# Patient Record
Sex: MALE | Race: WHITE | NOT HISPANIC OR LATINO | Employment: OTHER | URBAN - NONMETROPOLITAN AREA
[De-identification: names, ages, dates, MRNs, and addresses within clinical notes are randomized per-mention and may not be internally consistent; named-entity substitution may affect disease eponyms.]

---

## 2017-01-16 ENCOUNTER — APPOINTMENT (OUTPATIENT)
Dept: LAB | Facility: HOSPITAL | Age: 77
End: 2017-01-16

## 2017-01-16 ENCOUNTER — TRANSCRIBE ORDERS (OUTPATIENT)
Dept: ADMINISTRATIVE | Facility: HOSPITAL | Age: 77
End: 2017-01-16

## 2017-01-16 DIAGNOSIS — M25.449 EFFUSION OF HAND, UNSPECIFIED LATERALITY: Primary | ICD-10-CM

## 2017-01-16 LAB
BASOPHILS # BLD AUTO: 0.04 10*3/MM3 (ref 0–0.2)
BASOPHILS NFR BLD AUTO: 0.4 % (ref 0–2)
CHROMATIN AB SERPL-ACNC: <8.6 IU/ML (ref 0–11.9)
CRP SERPL-MCNC: 1.92 MG/DL (ref 0–0.99)
DEPRECATED RDW RBC AUTO: 48.1 FL (ref 40–54)
EOSINOPHIL # BLD AUTO: 1.06 10*3/MM3 (ref 0–0.7)
EOSINOPHIL NFR BLD AUTO: 10.5 % (ref 0–4)
ERYTHROCYTE [DISTWIDTH] IN BLOOD BY AUTOMATED COUNT: 14.8 % (ref 12–15)
ERYTHROCYTE [SEDIMENTATION RATE] IN BLOOD: 9 MM/HR (ref 0–15)
HCT VFR BLD AUTO: 41 % (ref 40–52)
HGB BLD-MCNC: 13.8 G/DL (ref 14–18)
IMM GRANULOCYTES # BLD: 0.03 10*3/MM3 (ref 0–0.03)
IMM GRANULOCYTES NFR BLD: 0.3 % (ref 0–5)
LYMPHOCYTES # BLD AUTO: 2.2 10*3/MM3 (ref 0.72–4.86)
LYMPHOCYTES NFR BLD AUTO: 21.7 % (ref 15–45)
MCH RBC QN AUTO: 30.2 PG (ref 28–32)
MCHC RBC AUTO-ENTMCNC: 33.7 G/DL (ref 33–36)
MCV RBC AUTO: 89.7 FL (ref 82–95)
MONOCYTES # BLD AUTO: 0.95 10*3/MM3 (ref 0.19–1.3)
MONOCYTES NFR BLD AUTO: 9.4 % (ref 4–12)
NEUTROPHILS # BLD AUTO: 5.86 10*3/MM3 (ref 1.87–8.4)
NEUTROPHILS NFR BLD AUTO: 57.7 % (ref 39–78)
PLATELET # BLD AUTO: 183 10*3/MM3 (ref 130–400)
PMV BLD AUTO: 12.2 FL (ref 6–12)
RBC # BLD AUTO: 4.57 10*6/MM3 (ref 4.8–5.9)
URATE SERPL-MCNC: 5.1 MG/DL (ref 3.5–8.5)
WBC NRBC COR # BLD: 10.14 10*3/MM3 (ref 4.8–10.8)

## 2017-01-16 PROCEDURE — 86431 RHEUMATOID FACTOR QUANT: CPT | Performed by: PHYSICIAN ASSISTANT

## 2017-01-16 PROCEDURE — 86140 C-REACTIVE PROTEIN: CPT | Performed by: PHYSICIAN ASSISTANT

## 2017-01-16 PROCEDURE — 86038 ANTINUCLEAR ANTIBODIES: CPT | Performed by: PHYSICIAN ASSISTANT

## 2017-01-16 PROCEDURE — 85025 COMPLETE CBC W/AUTO DIFF WBC: CPT | Performed by: PHYSICIAN ASSISTANT

## 2017-01-16 PROCEDURE — 86200 CCP ANTIBODY: CPT | Performed by: PHYSICIAN ASSISTANT

## 2017-01-16 PROCEDURE — 85651 RBC SED RATE NONAUTOMATED: CPT | Performed by: PHYSICIAN ASSISTANT

## 2017-01-16 PROCEDURE — 36415 COLL VENOUS BLD VENIPUNCTURE: CPT | Performed by: PHYSICIAN ASSISTANT

## 2017-01-16 PROCEDURE — 84550 ASSAY OF BLOOD/URIC ACID: CPT | Performed by: PHYSICIAN ASSISTANT

## 2017-01-17 LAB
ANA SER QL IA: POSITIVE
ANA SPECKLED TITR SER: ABNORMAL {TITER}
CCP IGA+IGG SERPL IA-ACNC: 12 UNITS (ref 0–19)
Lab: ABNORMAL

## 2019-04-04 RX ORDER — SODIUM BICARBONATE 650 MG/1
TABLET ORAL
COMMUNITY

## 2019-04-04 RX ORDER — AMLODIPINE BESYLATE 5 MG/1
TABLET ORAL
COMMUNITY
Start: 2015-10-21

## 2019-04-04 RX ORDER — OMEPRAZOLE 20 MG/1
CAPSULE, DELAYED RELEASE ORAL
COMMUNITY
End: 2022-05-11

## 2019-04-04 RX ORDER — ATORVASTATIN CALCIUM 10 MG/1
10 TABLET, FILM COATED ORAL 3 TIMES WEEKLY
COMMUNITY

## 2019-04-04 RX ORDER — PRASUGREL 10 MG/1
5 TABLET, FILM COATED ORAL DAILY
COMMUNITY

## 2019-04-04 RX ORDER — FLUTICASONE PROPIONATE 50 MCG
SPRAY, SUSPENSION (ML) NASAL
COMMUNITY
End: 2020-02-04 | Stop reason: ALTCHOICE

## 2019-04-04 RX ORDER — ATENOLOL 25 MG/1
TABLET ORAL
COMMUNITY

## 2019-04-04 RX ORDER — HYDROXYZINE HYDROCHLORIDE 10 MG/1
TABLET, FILM COATED ORAL
Status: ON HOLD | COMMUNITY
Start: 2015-12-21 | End: 2022-11-16

## 2019-04-04 RX ORDER — PANTOPRAZOLE SODIUM 20 MG/1
TABLET, DELAYED RELEASE ORAL
COMMUNITY
End: 2020-02-04 | Stop reason: ALTCHOICE

## 2019-04-04 RX ORDER — HYDRALAZINE HYDROCHLORIDE 10 MG/1
TABLET, FILM COATED ORAL
COMMUNITY
Start: 2015-12-21

## 2019-04-04 RX ORDER — GABAPENTIN 300 MG/1
CAPSULE ORAL
COMMUNITY

## 2019-04-04 RX ORDER — TAMSULOSIN HYDROCHLORIDE 0.4 MG/1
CAPSULE ORAL
COMMUNITY
Start: 2014-10-09

## 2019-04-04 RX ORDER — LOSARTAN POTASSIUM 25 MG/1
TABLET ORAL
COMMUNITY
Start: 2016-06-22

## 2019-04-04 RX ORDER — NITROGLYCERIN 0.4 MG/1
TABLET SUBLINGUAL
COMMUNITY

## 2019-04-17 ENCOUNTER — OFFICE VISIT (OUTPATIENT)
Dept: GASTROENTEROLOGY | Facility: CLINIC | Age: 79
End: 2019-04-17

## 2019-04-17 VITALS
HEIGHT: 68 IN | SYSTOLIC BLOOD PRESSURE: 126 MMHG | OXYGEN SATURATION: 97 % | DIASTOLIC BLOOD PRESSURE: 74 MMHG | WEIGHT: 190 LBS | HEART RATE: 78 BPM | TEMPERATURE: 97.6 F | BODY MASS INDEX: 28.79 KG/M2

## 2019-04-17 DIAGNOSIS — Z86.010 HISTORY OF ADENOMATOUS POLYP OF COLON: Primary | ICD-10-CM

## 2019-04-17 DIAGNOSIS — Z79.01 ANTICOAGULATED: ICD-10-CM

## 2019-04-17 PROBLEM — Z86.0101 HISTORY OF ADENOMATOUS POLYP OF COLON: Status: ACTIVE | Noted: 2019-04-17

## 2019-04-17 PROCEDURE — S0260 H&P FOR SURGERY: HCPCS | Performed by: NURSE PRACTITIONER

## 2019-04-17 RX ORDER — ASPIRIN 81 MG/1
81 TABLET ORAL DAILY
COMMUNITY
End: 2021-11-10 | Stop reason: ALTCHOICE

## 2019-04-17 RX ORDER — ALLOPURINOL 100 MG/1
100 TABLET ORAL DAILY
COMMUNITY

## 2019-04-17 RX ORDER — AMOXICILLIN 250 MG
CAPSULE ORAL
COMMUNITY

## 2019-04-17 NOTE — PROGRESS NOTES
Chief Complaint   Patient presents with   • Colonoscopy     3-22-16 colon 3 polyps 3 year recall       PCP: Laura Weston MD  REFER: No ref. provider found    Subjective     HPI    Hoang Renteria is a 78 y.o. male who presents to office for preventative maintenance.  There is  a personal history of colon polyps.  There is not a history of colon cancer.  He does not have complaints of nausea/vomiting, change in bowels, weight loss, no BRBPR, no melena.  There is not a family history of colon cancer.  There is not a family history of colon polyps.  He last colonoscopy-2016 .  Bowels do move on regular basis.    Anticoagulated due to history of cardiac stent placement    CScope (Dr Ann) 2016-tubular adenoma       Past Medical History:   Diagnosis Date   • Hyperlipidemia    • Hypertension      Past Surgical History:   Procedure Laterality Date   • COLONOSCOPY  03/22/2016    three polyps  all removed   • HERNIA REPAIR       Outpatient Medications Marked as Taking for the 4/17/19 encounter (Office Visit) with Brayan Rocha APRN   Medication Sig Dispense Refill   • allopurinol (ZYLOPRIM) 100 MG tablet Take 100 mg by mouth Daily.     • amLODIPine (NORVASC) 5 MG tablet Take 5 mg by mouth 2 times daily     • aspirin 81 MG EC tablet Take 81 mg by mouth Daily.     • atenolol (TENORMIN) 25 MG tablet Take 25 mg by mouth daily.     • atorvastatin (LIPITOR) 10 MG tablet Take 10 mg by mouth daily.     • Cobalamine Combinations (B-12) 100-5000 MCG sublingual tablet Place  under the tongue.     • gabapentin (NEURONTIN) 300 MG capsule Take 300 mg by mouth 2 times daily.     • hydrALAZINE (APRESOLINE) 10 MG tablet Take 10 mg by mouth 2 times daily     • hydrOXYzine (ATARAX) 10 MG tablet Take 10 mg by mouth daily     • losartan (COZAAR) 25 MG tablet Take 25 mg by mouth     • nitroglycerin (NITROSTAT) 0.4 MG SL tablet Place 0.4 mg under the tongue every 5 minutes as needed.     • omeprazole (priLOSEC) 20 MG capsule  Take 20 mg by mouth daily.     • prasugrel (EFFIENT) 10 MG tablet Take 10 mg by mouth daily.     • sodium bicarbonate 650 MG tablet Take 650 mg by mouth 3 times daily.     • tamsulosin (FLOMAX) 0.4 MG capsule 24 hr capsule Take 0.4 mg by mouth daily.       Allergies   Allergen Reactions   • Sulfamethoxazole-Trimethoprim Other (See Comments)     He was told at the hospital he was allergic to this medication.      Social History     Socioeconomic History   • Marital status:      Spouse name: Not on file   • Number of children: Not on file   • Years of education: Not on file   • Highest education level: Not on file   Tobacco Use   • Smoking status: Current Some Day Smoker     Packs/day: 1.00     Years: 60.00     Pack years: 60.00   • Smokeless tobacco: Former User   Substance and Sexual Activity   • Alcohol use: No     Frequency: Never   • Drug use: No     Family History   Problem Relation Age of Onset   • Colon cancer Neg Hx    • Colon polyps Neg Hx    • Esophageal cancer Neg Hx      Review of Systems   Constitutional: Negative for fatigue, fever and unexpected weight change.   HENT: Negative for hearing loss, sore throat and voice change.    Eyes: Negative for visual disturbance.   Respiratory: Negative for cough, shortness of breath and wheezing.    Cardiovascular: Negative for chest pain and palpitations.   Gastrointestinal: Negative for abdominal pain, blood in stool and vomiting.   Endocrine: Negative for polydipsia and polyuria.   Genitourinary: Negative for difficulty urinating, dysuria, hematuria and urgency.   Musculoskeletal: Negative for joint swelling and myalgias.   Skin: Negative for color change, rash and wound.   Neurological: Negative for dizziness, tremors, seizures and syncope.   Hematological: Does not bruise/bleed easily.   Psychiatric/Behavioral: Negative for agitation and confusion. The patient is not nervous/anxious.      Objective   Vitals:    04/17/19 0809   BP: 126/74   Pulse: 78    Temp: 97.6 °F (36.4 °C)   SpO2: 97%     Physical Exam   Constitutional: He is oriented to person, place, and time. He appears well-developed and well-nourished. He is cooperative.   HENT:   Head: Normocephalic and atraumatic.   Eyes: Conjunctivae are normal. Pupils are equal, round, and reactive to light. No scleral icterus.   Neck: Normal range of motion. Neck supple. No JVD present. No thyroid mass and no thyromegaly present.   Cardiovascular: Normal rate, regular rhythm and normal heart sounds. Exam reveals no gallop and no friction rub.   No murmur heard.  Pulmonary/Chest: Effort normal and breath sounds normal. No accessory muscle usage. No respiratory distress. He has no wheezes. He has no rales.   Abdominal: Soft. Normal appearance and bowel sounds are normal. He exhibits no distension, no ascites and no mass. There is no hepatosplenomegaly. There is no tenderness. There is no rebound and no guarding.   Musculoskeletal: Normal range of motion. He exhibits no edema or tenderness.     Vascular Status -  His right foot exhibits normal foot vasculature  and no edema. His left foot exhibits normal foot vasculature  and no edema.  Lymphadenopathy:     He has no cervical adenopathy.   Neurological: He is alert and oriented to person, place, and time. He has normal strength. Gait normal.   Skin: Skin is warm, dry and intact. No rash noted.     Imaging Results (most recent)     None        Body mass index is 28.89 kg/m².    Assessment/Plan   Hoang was seen today for colonoscopy.    Diagnoses and all orders for this visit:    History of adenomatous polyp of colon  -     Case Request; Standing  -     Implement Anesthesia Orders Day of Procedure; Standing  -     Obtain Informed Consent; Standing  -     Case Request    Anticoagulated  Comments:  will need to hold 7 days prior to procedure      COLONOSCOPY WITH ANESTHESIA (N/A)    Patient is to hold their anticoagulation medication per the direction of their prescribing  practioner, CITLALLI Morin. This is to prevent any risk or complication from bleeding intra and post procedure. If they develop bleeding post procedure they are to go the emergency department for further evaluation and treatment immediately    Advised pt to stop ASA, use of NSAIDs, Fish Oil, and MV 5 days prior to procedure, per Dr Ann protocol.  Tylenol based products are ok to take.  Pt verbalized understanding.    Patient is to continue all blood pressure and cardiac medications prior to procedure and has been advised to take medications morning of procedure   Pt verbalized understanding    All risks, benefits, alternatives, and indications of colonoscopy procedure have been discussed with the patient. Risks to include perforation of the colon requiring possible surgery or colostomy, risk of bleeding from biopsies or removal of colon tissue, possibility of missing a colon polyp or cancer, or adverse drug reaction.  Benefits to include the diagnosis and management of disease of the colon and rectum. Alternatives to include barium enema, radiographic evaluation, lab testing or no intervention. He verbalizes understanding and agrees.     Patient's Body mass index is 28.89 kg/m². BMI is above normal parameters. Recommendations include: no follow up     I advised Hoang of the risks of continuing to use tobacco, and I provided him with tobacco cessation educational materials in the After Visit Summary.     During this visit, I spent 3 minutes counseling the patient regarding tobacco cessation.  .      There are no Patient Instructions on file for this visit.

## 2019-05-01 ENCOUNTER — HOSPITAL ENCOUNTER (OUTPATIENT)
Facility: HOSPITAL | Age: 79
Setting detail: HOSPITAL OUTPATIENT SURGERY
Discharge: HOME | End: 2019-05-01
Attending: INTERNAL MEDICINE | Admitting: INTERNAL MEDICINE

## 2019-05-01 ENCOUNTER — ANESTHESIA (OUTPATIENT)
Dept: GASTROENTEROLOGY | Facility: HOSPITAL | Age: 79
End: 2019-05-01

## 2019-05-01 ENCOUNTER — ANESTHESIA EVENT (OUTPATIENT)
Dept: GASTROENTEROLOGY | Facility: HOSPITAL | Age: 79
End: 2019-05-01

## 2019-05-01 VITALS
HEIGHT: 67 IN | RESPIRATION RATE: 16 BRPM | DIASTOLIC BLOOD PRESSURE: 67 MMHG | SYSTOLIC BLOOD PRESSURE: 126 MMHG | WEIGHT: 183 LBS | TEMPERATURE: 97.6 F | OXYGEN SATURATION: 93 % | HEART RATE: 63 BPM | BODY MASS INDEX: 28.72 KG/M2

## 2019-05-01 DIAGNOSIS — Z86.010 HISTORY OF ADENOMATOUS POLYP OF COLON: ICD-10-CM

## 2019-05-01 PROCEDURE — 25010000002 PROPOFOL 10 MG/ML EMULSION: Performed by: NURSE ANESTHETIST, CERTIFIED REGISTERED

## 2019-05-01 PROCEDURE — 88305 TISSUE EXAM BY PATHOLOGIST: CPT | Performed by: INTERNAL MEDICINE

## 2019-05-01 PROCEDURE — 45385 COLONOSCOPY W/LESION REMOVAL: CPT | Performed by: INTERNAL MEDICINE

## 2019-05-01 RX ORDER — SODIUM CHLORIDE 0.9 % (FLUSH) 0.9 %
3 SYRINGE (ML) INJECTION AS NEEDED
Status: DISCONTINUED | OUTPATIENT
Start: 2019-05-01 | End: 2019-05-01 | Stop reason: HOSPADM

## 2019-05-01 RX ORDER — SODIUM CHLORIDE 9 MG/ML
100 INJECTION, SOLUTION INTRAVENOUS CONTINUOUS
Status: CANCELLED | OUTPATIENT
Start: 2019-05-01

## 2019-05-01 RX ORDER — IPRATROPIUM BROMIDE AND ALBUTEROL SULFATE 2.5; .5 MG/3ML; MG/3ML
3 SOLUTION RESPIRATORY (INHALATION) ONCE
Status: COMPLETED | OUTPATIENT
Start: 2019-05-01 | End: 2019-05-01

## 2019-05-01 RX ORDER — SODIUM CHLORIDE 0.9 % (FLUSH) 0.9 %
3 SYRINGE (ML) INJECTION EVERY 12 HOURS SCHEDULED
Status: CANCELLED | OUTPATIENT
Start: 2019-05-01

## 2019-05-01 RX ORDER — SODIUM CHLORIDE 0.9 % (FLUSH) 0.9 %
3-10 SYRINGE (ML) INJECTION AS NEEDED
Status: CANCELLED | OUTPATIENT
Start: 2019-05-01

## 2019-05-01 RX ORDER — SODIUM CHLORIDE 9 MG/ML
500 INJECTION, SOLUTION INTRAVENOUS CONTINUOUS PRN
Status: DISCONTINUED | OUTPATIENT
Start: 2019-05-01 | End: 2019-05-01 | Stop reason: HOSPADM

## 2019-05-01 RX ORDER — LIDOCAINE HYDROCHLORIDE 20 MG/ML
INJECTION, SOLUTION INFILTRATION; PERINEURAL AS NEEDED
Status: DISCONTINUED | OUTPATIENT
Start: 2019-05-01 | End: 2019-05-01 | Stop reason: SURG

## 2019-05-01 RX ORDER — PROPOFOL 10 MG/ML
VIAL (ML) INTRAVENOUS AS NEEDED
Status: DISCONTINUED | OUTPATIENT
Start: 2019-05-01 | End: 2019-05-01 | Stop reason: SURG

## 2019-05-01 RX ADMIN — PROPOFOL 100 MG: 10 INJECTION, EMULSION INTRAVENOUS at 07:40

## 2019-05-01 RX ADMIN — IPRATROPIUM BROMIDE AND ALBUTEROL SULFATE 3 ML: 2.5; .5 SOLUTION RESPIRATORY (INHALATION) at 07:36

## 2019-05-01 RX ADMIN — LIDOCAINE HYDROCHLORIDE 50 MG: 20 INJECTION, SOLUTION INFILTRATION; PERINEURAL at 07:39

## 2019-05-01 RX ADMIN — PROPOFOL 75 MG: 10 INJECTION, EMULSION INTRAVENOUS at 07:50

## 2019-05-01 RX ADMIN — SODIUM CHLORIDE 500 ML: 9 INJECTION, SOLUTION INTRAVENOUS at 07:14

## 2019-05-01 RX ADMIN — PROPOFOL 75 MG: 10 INJECTION, EMULSION INTRAVENOUS at 07:46

## 2019-05-01 NOTE — ANESTHESIA PREPROCEDURE EVALUATION
Anesthesia Evaluation     Patient summary reviewed   no history of anesthetic complications:  NPO Solid Status: > 8 hours  NPO Liquid Status: > 4 hours           Airway   Mallampati: I  TM distance: >3 FB  Neck ROM: full  No difficulty expected  Dental    (+) upper dentures and lower dentures    Pulmonary    (+) a smoker Current Smoked day of surgery, wheezes,   (-) asthma, sleep apnea  Cardiovascular   Exercise tolerance: good (4-7 METS)    Patient on routine beta blocker and Beta blocker given within 24 hours of surgery    (+) hypertension, CAD, cardiac stents (2011) hyperlipidemia,   (-) past MI, angina    ROS comment: Off effient  4/24    Neuro/Psych  (-) seizures, TIA, CVA  GI/Hepatic/Renal/Endo    (+)  GERD,    (-) liver disease, no renal disease, diabetes    Musculoskeletal     Abdominal    Substance History      OB/GYN          Other                        Anesthesia Plan    ASA 3     MAC     intravenous induction   Anesthetic plan, all risks, benefits, and alternatives have been provided, discussed and informed consent has been obtained with: patient.

## 2019-05-01 NOTE — ANESTHESIA POSTPROCEDURE EVALUATION
Patient: Hoang Renteria    Procedure Summary     Date:  05/01/19 Room / Location:  [facility] ENDOSCOPY 5 / [facility]    Anesthesia Start:  0735 Anesthesia Stop:  0755    Procedure:  COLONOSCOPY WITH ANESTHESIA (N/A ) Diagnosis:       History of adenomatous polyp of colon      (History of adenomatous polyp of colon [Z86.010])    Surgeon:  Reddy Ann DO Provider:  Adam Reed CRNA    Anesthesia Type:  MAC ASA Status:  3          Anesthesia Type: MAC  Last vitals  BP   112/77 (05/01/19 0655)   Temp   97.6 °F (36.4 °C) (05/01/19 0655)   Pulse   66 (05/01/19 0655)   Resp   20 (05/01/19 0655)     SpO2   94 % (05/01/19 0655)     Post Anesthesia Care and Evaluation    Patient location during evaluation: PHASE II  Patient participation: complete - patient participated  Level of consciousness: awake  Pain score: 0  Pain management: adequate  Airway patency: patent  Anesthetic complications: No anesthetic complications  PONV Status: none  Cardiovascular status: acceptable  Respiratory status: acceptable  Hydration status: acceptable

## 2019-05-02 LAB
CYTO UR: NORMAL
LAB AP CASE REPORT: NORMAL
PATH REPORT.FINAL DX SPEC: NORMAL
PATH REPORT.GROSS SPEC: NORMAL

## 2019-07-05 ENCOUNTER — TELEPHONE (OUTPATIENT)
Dept: GASTROENTEROLOGY | Facility: CLINIC | Age: 79
End: 2019-07-05

## 2020-02-04 ENCOUNTER — OFFICE VISIT (OUTPATIENT)
Dept: PULMONOLOGY | Facility: CLINIC | Age: 80
End: 2020-02-04

## 2020-02-04 VITALS
SYSTOLIC BLOOD PRESSURE: 132 MMHG | WEIGHT: 182 LBS | DIASTOLIC BLOOD PRESSURE: 70 MMHG | HEART RATE: 77 BPM | HEIGHT: 67 IN | OXYGEN SATURATION: 94 % | BODY MASS INDEX: 28.56 KG/M2

## 2020-02-04 DIAGNOSIS — R06.02 SHORTNESS OF BREATH: ICD-10-CM

## 2020-02-04 DIAGNOSIS — J44.9 STAGE 2 MODERATE COPD BY GOLD CLASSIFICATION (HCC): ICD-10-CM

## 2020-02-04 DIAGNOSIS — Z87.891 PERSONAL HISTORY OF NICOTINE DEPENDENCE: ICD-10-CM

## 2020-02-04 DIAGNOSIS — R91.1 NODULE OF UPPER LOBE OF LEFT LUNG: Primary | ICD-10-CM

## 2020-02-04 PROCEDURE — 94010 BREATHING CAPACITY TEST: CPT | Performed by: INTERNAL MEDICINE

## 2020-02-04 PROCEDURE — 99204 OFFICE O/P NEW MOD 45 MIN: CPT | Performed by: INTERNAL MEDICINE

## 2020-02-04 PROCEDURE — 94729 DIFFUSING CAPACITY: CPT | Performed by: INTERNAL MEDICINE

## 2020-02-04 RX ORDER — ERGOCALCIFEROL 1.25 MG/1
50000 CAPSULE ORAL
COMMUNITY
Start: 2019-06-21

## 2020-02-04 RX ORDER — CALCIUM CARBONATE/VITAMIN D3 600 MG-10
1 TABLET ORAL
COMMUNITY

## 2020-02-04 NOTE — PROGRESS NOTES
"Subjective   Hoang Renteria is a 79 y.o. male.     Background: Pt with abbi nodule with 1.87 SUV equivocal, increased in size 6 mm to 14 mm 9/2018 to 1/2020.  hx RA, CAD s/p stents 2012, 2014    Chief Complaint   Patient presents with   • Lung Nodule        History of Present Illness   He reports a \"spot on his lung\" for several years which has been stable for many year.  On ct done 1/16 the nodule in ABBI had increased from 6 to 14 mm since 9/2018.  He has psoriatic arthritis.  He gets dyspnea on exertion in chest for several years, unchanged, unimproved with inhalers.  He had PFT in [place] years ago, does not remember results.  He has moderate intermittent dyspnea in chest for several months aggravated by exertion and alleviated by rest.  Dr. Weston has asked me to see him for this.  Records from Dr. Weston are reviewed and in summary indicate screening ct with results and follow up as above with hx smoking.    Medical/Family/Social History   has a past medical history of Hyperlipidemia, Hypertension, and Shortness of breath (2/4/2020).   has a past surgical history that includes Hernia repair; Colonoscopy (03/22/2016); and Colonoscopy (N/A, 5/1/2019).  family history is not on file.   reports that he has been smoking cigarettes. He has a 65.00 pack-year smoking history. He has quit using smokeless tobacco. He reports that he does not drink alcohol or use drugs.  Allergies   Allergen Reactions   • Sulfamethoxazole-Trimethoprim Other (See Comments)     He was told at the hospital he was allergic to this medication. \"affected his kidneys\"     Medications    Current Outpatient Medications:   •  allopurinol (ZYLOPRIM) 100 MG tablet, Take 100 mg by mouth Daily., Disp: , Rfl:   •  amLODIPine (NORVASC) 5 MG tablet, Take 5 mg by mouth 2 times daily, Disp: , Rfl:   •  aspirin 81 MG EC tablet, Take 81 mg by mouth Daily., Disp: , Rfl:   •  atenolol (TENORMIN) 25 MG tablet, Take 25 mg by mouth daily., Disp: , Rfl:   •  " atorvastatin (LIPITOR) 10 MG tablet, Take 10 mg by mouth daily., Disp: , Rfl:   •  Cobalamine Combinations (B-12) 100-5000 MCG sublingual tablet, Place  under the tongue., Disp: , Rfl:   •  gabapentin (NEURONTIN) 300 MG capsule, Take 300 mg by mouth 2 times daily., Disp: , Rfl:   •  hydrALAZINE (APRESOLINE) 10 MG tablet, Take 10 mg by mouth 2 times daily, Disp: , Rfl:   •  hydrOXYzine (ATARAX) 10 MG tablet, Take 10 mg by mouth daily, Disp: , Rfl:   •  losartan (COZAAR) 25 MG tablet, Take 25 mg by mouth, Disp: , Rfl:   •  nitroglycerin (NITROSTAT) 0.4 MG SL tablet, Place 0.4 mg under the tongue every 5 minutes as needed., Disp: , Rfl:   •  Omega 3 1200 MG capsule, Take 1 capsule by mouth., Disp: , Rfl:   •  omeprazole (priLOSEC) 20 MG capsule, Take 20 mg by mouth daily., Disp: , Rfl:   •  prasugrel (EFFIENT) 10 MG tablet, Take 10 mg by mouth daily., Disp: , Rfl:   •  sodium bicarbonate 650 MG tablet, Take 650 mg by mouth 3 times daily., Disp: , Rfl:   •  tamsulosin (FLOMAX) 0.4 MG capsule 24 hr capsule, Take 0.4 mg by mouth daily., Disp: , Rfl:   •  vitamin D (ERGOCALCIFEROL) 1.25 MG (57298 UT) capsule capsule, Take 50,000 Units by mouth Every 30 (Thirty) Days., Disp: , Rfl:   •  umeclidinium-vilanterol (ANORO ELLIPTA) 62.5-25 MCG/INH aerosol powder  inhaler, Inhale 1 puff Daily for 14 days., Disp: 2 each, Rfl: 0    Review of Systems   Constitutional: Negative for chills and fever.   HENT: Negative for trouble swallowing and voice change.    Eyes: Negative for photophobia and visual disturbance.   Respiratory: Negative for shortness of breath.    Gastrointestinal: Negative for abdominal pain, nausea, vomiting and GERD.   Genitourinary: Negative for difficulty urinating and hematuria.   Musculoskeletal: Negative for gait problem and joint swelling.   Skin: Negative for pallor and skin lesions.   Neurological: Negative for tremors, weakness and confusion.   Hematological: Negative for adenopathy. Does not bruise/bleed  "easily.   Psychiatric/Behavioral: The patient is not nervous/anxious.          Objective   /70   Pulse 77   Ht 170.2 cm (67\")   Wt 82.6 kg (182 lb)   SpO2 94% Comment: RA  BMI 28.51 kg/m²   Physical Exam   Constitutional: He appears well-developed and well-nourished. He does not appear ill. No distress.   HENT:   Head: Normocephalic and atraumatic.   Nose: Nose normal.   Eyes: Conjunctivae and EOM are normal.   Neck: Neck supple.   Cardiovascular: Normal rate, regular rhythm, S1 normal and S2 normal.   Pulmonary/Chest: Effort normal. He has no wheezes. He has no rales.   Abdominal: Soft. He exhibits no distension. There is no tenderness. There is no guarding.   Musculoskeletal: He exhibits no deformity.   Lymphadenopathy:     He has no cervical adenopathy.   Neurological: He is alert.   Skin: Skin is warm and dry. No rash noted.   Psychiatric: He has a normal mood and affect.        -----------------------------------------------------------------------------------------------      EXAM: CT CHEST W WO CONTRAST -- 1/16/2020 8:45 AM  HISTORY: 79 years, Male, pulmonary nodule follow-up  COMPARISON: 9/24/2018  DLP: 651 mGy cm. Automated exposure control was utilized to minimize  patient radiation dose.  TECHNIQUE: Unenhanced and enhanced CT images of the chest obtained  with multiplanar reformats.  FINDINGS:   AIRWAYS/PULMONARY PARENCHYMA: The central airways are midline and  patent. Some layering secretions or debris in the right lower lobe  small airways. Mild thickening of the small airways.  Left upper lobe with a 14 x 7 mm pulmonary nodule, previously 6 mm on  9/24/2018.   Right lower lobe with 7 mm pleural-based pulmonary nodule, previously  7 mm on 9/24/2018.  No pleural effusion. No pneumothorax.   VASCULATURE: Thoracic aorta is normal in course and caliber. Mild  calcified aortic atherosclerosis. Right brachiocephalic artery with  eccentric noncalcified plaque. Normal pulmonary artery caliber. " No  large central pulmonary artery filling defect on this non-CTA exam.   CARDIAC:  Cardiac size is normal.  No pericardial effusion. Scattered  coronary artery calcifications.   MEDIASTINUM: There is no mediastinal or hilar lymphadenopathy by CT  size criteria. Esophagus has normal coarse, caliber and wall  thickness.  EXTRATHORACIC: The visualized portions of the thyroid gland are  unremarkable. No thoracic inlet or axillary adenopathy. The  extrathoracic soft tissues are within normal limits.   INCLUDED UPPER ABDOMEN: The visualized portion of the liver,  gallbladder, pancreas, spleen, adrenal glands are within normal  limits. Stable left upper renal contour compared to 9/24/2018, likely  a renal cyst.  OSSEOUS: No acute or suspicious bony finding. Flowing osteophytes  suggest diffuse idiopathic skeletal hyperostosis (DISH).   IMPRESSION:  1. Increased size of left upper lobe pulmonary nodule now measuring 14  x 7 mm, previously 6 mm on 9/24/2018. This is concerning for  malignancy. Consider PET/CT or tissue sampling for further evaluation.  2. No lymphadenopathy.  3. Coronary artery, aortic and branch vessel atherosclerosis.  Epic message sent to Dr. Laura Weston at 10:46 AM on 1/16/2020.  Signed by Dr Analilia Gonzalez on 1/16/2020 10:47 AM      PET:  IMPRESSION:  1. Left upper lobe pulmonary nodule, only mildly hypermetabolic with a  maximum density of 1.87 SUV, equivocal for malignancy. Follow-up  recommended.  2. No scintigraphic evidence of hypermetabolic neoplastic disease.  Signed by Dr Edwin Singh on 1/24/2020 3:36 PM    My interpretation: peripheral nodule in posterior SANDRO  Progressive increase in size from 5 mm since 6/2019  -----------------------------------------------------------------------------------------------  PFT Values        Some values may be hidden. Unless noted otherwise, only the newest values recorded on each date are displayed.         Old Values PFT Results 2/4/20   No data to  display.      Pre Drug PFT Results 2/4/20   FVC 87   FEV1 76   FEF 25-75% 42   FEV1/FVC 67.35      Post Drug PFT Results 2/4/20   No data to display.      Other Tests PFT Results 2/4/20   DLCO 55   D/VAsb 52           My interpretation of the PFT: moderate airflow obstruction, reduced dlco.     -----------------------------------------------------------------------------------------------  Assessment/Plan   Problem List Items Addressed This Visit        Pulmonary Problems    Nodule of upper lobe of left lung - Primary    Relevant Orders    Pulmonary Function Test (Completed)    Ambulatory Referral to Cardiothoracic Surgery (Completed)    Shortness of breath    Relevant Orders    Pulmonary Function Test (Completed)        Patient's Body mass index is 28.51 kg/m². BMI is within normal parameters. No follow-up required..      The series of images is reviewed.  I am very concerned about the progressive growth, despite the equivocal pet result, with risk for lung cancer.  Refer to CT surgery for eval for wedge +/- lobectomy.  FEV1 ok, although DLCO is reduced.I am giving him a sample of anoro and we showed him how to use it.  I do not know why epic did not embed that action under plan above, but I can't get the program to do that.  Recommend smoking abstinence. On anticoagulation which will need to be held prior to surgery.       Electronically signed by Camron Borjas MD, 2/4/2020, 8:59 PM

## 2020-02-04 NOTE — PROCEDURES
Pulmonary Function Test  Performed by: Camron Borjas MD  Authorized by: Camron Borjas MD      Pre Drug    FVC: 87%   FEV1: 76%   FEF 25-75%: 42%   FEV1/FVC: 67.35%   DLCO: 55%   D/VAsb: 52%

## 2020-05-06 ENCOUNTER — OFFICE VISIT (OUTPATIENT)
Dept: PULMONOLOGY | Facility: CLINIC | Age: 80
End: 2020-05-06

## 2020-05-06 DIAGNOSIS — Z87.891 PERSONAL HISTORY OF NICOTINE DEPENDENCE: ICD-10-CM

## 2020-05-06 DIAGNOSIS — J44.9 STAGE 2 MODERATE COPD BY GOLD CLASSIFICATION (HCC): Primary | ICD-10-CM

## 2020-05-06 DIAGNOSIS — Z85.118 PERSONAL HISTORY OF MALIGNANT NEOPLASM OF BRONCHUS AND LUNG: ICD-10-CM

## 2020-05-06 PROCEDURE — 99441 PR PHYS/QHP TELEPHONE EVALUATION 5-10 MIN: CPT | Performed by: INTERNAL MEDICINE

## 2020-05-06 NOTE — PROGRESS NOTES
"Subjective   Hoang Renteria is a 79 y.o. male.     Background: Pt with lung cancer resected by wedge resection SANDRO 3/2020, postop resp failure with hypoxia, hx RA, CAD s/p stents 2012, 2014.   Htn, CKD stage 3.    This visit has been rescheduled as a phone visit to comply with patient safety concerns in accordance with CDC recommendations. Total time of discussion was 8 minutes.     Chief Complaint   Patient presents with   • Lung Nodule        History of Present Illness   He follows up by phone following resection of his adenocarcinoma.  This was a wedge resection of the left upper lobe.  This was done in March.  There was a small tumor that will not require adjuvant therapy.  He went home with oxygen but has improved from that standpoint, has a home pulse oximeter and saturations are always in the 90s when he checks at on room air, and interestingly saturations have been higher, as high as 96% when he is active rather than at rest when they are a little lower indicating recruitment.  No other complaints today.    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   has a past surgical history that includes Hernia repair; Colonoscopy (03/22/2016); and Colonoscopy (N/A, 5/1/2019).  family history is not on file.   reports that he quit smoking about 2 months ago. His smoking use included cigarettes. He has a 65.00 pack-year smoking history. He has quit using smokeless tobacco. He reports that he does not drink alcohol or use drugs.  Allergies   Allergen Reactions   • Sulfamethoxazole-Trimethoprim Other (See Comments)     He was told at the hospital he was allergic to this medication. \"affected his kidneys\"     Medications    Current Outpatient Medications:   •  allopurinol (ZYLOPRIM) 100 MG tablet, Take 100 mg by mouth Daily., Disp: , Rfl:   •  amLODIPine (NORVASC) 5 MG " tablet, Take 5 mg by mouth 2 times daily, Disp: , Rfl:   •  aspirin 81 MG EC tablet, Take 81 mg by mouth Daily., Disp: , Rfl:   •  atenolol (TENORMIN) 25 MG tablet, Take 25 mg by mouth daily., Disp: , Rfl:   •  atorvastatin (LIPITOR) 10 MG tablet, Take 10 mg by mouth daily., Disp: , Rfl:   •  Cobalamine Combinations (B-12) 100-5000 MCG sublingual tablet, Place  under the tongue., Disp: , Rfl:   •  gabapentin (NEURONTIN) 300 MG capsule, Take 300 mg by mouth 2 times daily., Disp: , Rfl:   •  hydrALAZINE (APRESOLINE) 10 MG tablet, Take 10 mg by mouth 2 times daily, Disp: , Rfl:   •  hydrOXYzine (ATARAX) 10 MG tablet, Take 10 mg by mouth daily, Disp: , Rfl:   •  losartan (COZAAR) 25 MG tablet, Take 25 mg by mouth, Disp: , Rfl:   •  nitroglycerin (NITROSTAT) 0.4 MG SL tablet, Place 0.4 mg under the tongue every 5 minutes as needed., Disp: , Rfl:   •  Omega 3 1200 MG capsule, Take 1 capsule by mouth., Disp: , Rfl:   •  omeprazole (priLOSEC) 20 MG capsule, Take 20 mg by mouth daily., Disp: , Rfl:   •  prasugrel (EFFIENT) 10 MG tablet, Take 10 mg by mouth daily., Disp: , Rfl:   •  sodium bicarbonate 650 MG tablet, Take 650 mg by mouth 3 times daily., Disp: , Rfl:   •  tamsulosin (FLOMAX) 0.4 MG capsule 24 hr capsule, Take 0.4 mg by mouth daily., Disp: , Rfl:   •  vitamin D (ERGOCALCIFEROL) 1.25 MG (14804 UT) capsule capsule, Take 50,000 Units by mouth Every 30 (Thirty) Days., Disp: , Rfl:          PFT Values        Some values may be hidden. Unless noted otherwise, only the newest values recorded on each date are displayed.         Old Values PFT Results 2/4/20   No data to display.      Pre Drug PFT Results 2/4/20   FVC 87   FEV1 76   FEF 25-75% 42   FEV1/FVC 67.35      Post Drug PFT Results 2/4/20   No data to display.      Other Tests PFT Results 2/4/20   DLCO 55   D/VAsb 52                Assessment/Plan   Problem List Items Addressed This Visit        Pulmonary Problems    Nodule of upper lobe of left lung    Stage 2  moderate COPD by GOLD classification (CMS/HCC) - Primary       Other    Personal history of nicotine dependence          We will plan follow-up in September with repeat chest CT as well as pulmonary function testing then.  We will try to see him for a physical real visit in the office.  Unable to do physical exam over the phone other than just listening to his voice.  His voice is strong and able to complete full sentences and is in good spirits and good humor.       Electronically signed by Camron Borjas MD, 5/6/2020, 09:17

## 2020-08-31 DIAGNOSIS — Z85.118 PERSONAL HISTORY OF MALIGNANT NEOPLASM OF BRONCHUS AND LUNG: ICD-10-CM

## 2020-08-31 DIAGNOSIS — Z87.891 PERSONAL HISTORY OF NICOTINE DEPENDENCE: ICD-10-CM

## 2020-08-31 DIAGNOSIS — J44.9 STAGE 2 MODERATE COPD BY GOLD CLASSIFICATION (HCC): ICD-10-CM

## 2020-09-07 PROBLEM — Z85.118 PERSONAL HISTORY OF LUNG CANCER: Status: ACTIVE | Noted: 2020-09-07

## 2020-09-08 ENCOUNTER — OFFICE VISIT (OUTPATIENT)
Dept: PULMONOLOGY | Facility: CLINIC | Age: 80
End: 2020-09-08

## 2020-09-08 VITALS
OXYGEN SATURATION: 93 % | BODY MASS INDEX: 28.72 KG/M2 | SYSTOLIC BLOOD PRESSURE: 126 MMHG | HEART RATE: 90 BPM | HEIGHT: 67 IN | DIASTOLIC BLOOD PRESSURE: 84 MMHG | TEMPERATURE: 98.1 F | WEIGHT: 183 LBS

## 2020-09-08 DIAGNOSIS — Z85.118 PERSONAL HISTORY OF LUNG CANCER: ICD-10-CM

## 2020-09-08 DIAGNOSIS — Z87.891 PERSONAL HISTORY OF NICOTINE DEPENDENCE: ICD-10-CM

## 2020-09-08 DIAGNOSIS — Z85.118 PERSONAL HISTORY OF MALIGNANT NEOPLASM OF BRONCHUS AND LUNG: ICD-10-CM

## 2020-09-08 DIAGNOSIS — J44.9 STAGE 2 MODERATE COPD BY GOLD CLASSIFICATION (HCC): Primary | ICD-10-CM

## 2020-09-08 PROCEDURE — 99214 OFFICE O/P EST MOD 30 MIN: CPT | Performed by: INTERNAL MEDICINE

## 2020-09-08 NOTE — PATIENT INSTRUCTIONS
Glycopyrrolate; Formoterol inhalation aerosol  What is this medicine?  GLYCOPYRROLATE; FORMOTEROL (romina ventura; for EFREN karimi) inhalation is a combination of 2 medicines that help to open up the airways of your lungs. This medicine is used to treat chronic obstructive pulmonary disease (COPD). Do NOT use for an acute COPD attack.  This medicine may be used for other purposes; ask your health care provider or pharmacist if you have questions.  COMMON BRAND NAME(S): Paty  What should I tell my health care provider before I take this medicine?  They need to know if you have any of these conditions:  · bladder problems or difficulty passing urine  · diabetes  · glaucoma  · heart disease  · high blood pressure  · history of an irregular heartbeat  · kidney disease  · liver disease  · prostate trouble  · seizures  · thyroid disease  · an unusual or allergic reaction to glycopyrrolate, formoterol, other medicines, foods, dyes, or preservatives  · pregnant or trying to get pregnant  · breast-feeding  How should I use this medicine?  This medicine is inhaled through the mouth. Follow the directions on the prescription label. Shake well before each use. Take your medicine at regular intervals. Do not take your medicine more often than directed. Do not stop taking except on your doctor's advice. Make sure that you are using your inhaler correctly. Ask you doctor or health care provider if you have any questions.  Talk to your pediatrician regarding the use of this medicine in children. This medicine is not approved for use in children.  Overdosage: If you think you have taken too much of this medicine contact a poison control center or emergency room at once.  NOTE: This medicine is only for you. Do not share this medicine with others.  What if I miss a dose?  If you miss a dose, use it as soon as you can. If it is almost time for your next dose, use only that dose. Do not use double or extra doses.  What may  interact with this medicine?  Do not take the medicine with any of the following medications:  · cisapride  · dofetilide  · dronedarone  · other medicines that contain long-acting beta-2 agonists (LABAs) like formoterol, indacaterol, olodaterol, salmeterol, vilanterol  · pimozide  · thioridazine  · ziprasidone  This medicine may also interact with the following medications:  · antihistamines for allergy, cough, and cold  · certain medicines for bladder problems like oxybutyin and tolterodine  · certain medicines for blood pressure, heart disease, irregular heart beat  · certain medicines for depression, anxiety, or psychotic disturbances  · MAOIs like Carbex, Eldepryl, Marplan, Nardil, and Parnate  · other medicines that contain an anticholinergic like aclidinium, ipratropium, glycopyrrolate, tiotropium, umeclidinium  · other medicines that prolong the QT interval (cause an abnormal heart rhythm)  · steroid medicines like prednisone or cortisone  · stimulant medicines for attention disorders, weight loss, or to stay awake  · theophylline  This list may not describe all possible interactions. Give your health care provider a list of all the medicines, herbs, non-prescription drugs, or dietary supplements you use. Also tell them if you smoke, drink alcohol, or use illegal drugs. Some items may interact with your medicine.  What should I watch for while using this medicine?  Visit your doctor for regular check ups. If your symptoms get worse or if you need your short-acting inhalers more often, call your doctor right away. Do not take more medicine than directed.  Do not get the this medicine in your eyes. It can cause irritation, pain, or blurred vision.  You may get dizzy. Do not drive, use machinery, or do anything that needs mental alertness until you know how this medicine affects you. Do not stand or sit up quickly, especially if you are an older patient. This reduces the risk of dizzy or fainting spells.  Clean  the inhaler as directed in the patient information sheet that comes with this medicine.  What side effects may I notice from receiving this medicine?  Side effects that you should report to your doctor or health care professional as soon as possible:  · allergic reactions like skin rash, itching or hives, swelling of the face, lips, or tongue  · breathing problems  · changes in vision, eye pain  · muscle cramps or muscle pain  · nervousness  · pain or difficulty passing urine or change in the amount of urine  · signs and symptoms of a dangerous change in heartbeat or heart rhythm like chest pain; dizziness; fast or irregular heart beat; palpitations; feeling faint or lightheaded, falls; breathing problems  · signs and symptoms of high blood sugar such as dizziness; dry mouth; dry skin; fruity breath; nausea; stomach pain; increased hunger or thirst; increased urination  · tremor  Side effects that usually do not require medical attention (report to your doctor or health care professional if they continue or are bothersome):  · cough  · runny nose  · sore throat  This list may not describe all possible side effects. Call your doctor for medical advice about side effects. You may report side effects to FDA at 3-161-FDA-5926.  Where should I keep my medicine?  Keep out of the reach of children.  Store in a dry place at room temperature between 20 and 25 degrees C (68 and 77 degrees F). The contents are under pressure and may burst when exposed to heat or flame. Do not get the canister of the inhaler wet. Do not freeze. Inhalers need to be thrown away after the labeled number of puffs have been used or by the expiration date, whichever comes first. This inhaler should be thrown away 3 months after removing from foil pouch.  NOTE: This sheet is a summary. It may not cover all possible information. If you have questions about this medicine, talk to your doctor, pharmacist, or health care provider.  © 2020 [employer] (2019-06-03 11:55:23)

## 2020-09-08 NOTE — PROGRESS NOTES
"Subjective   Hoang Renteria is a 80 y.o. male.     Background: Pt with lung cancer resected by wedge resection SANDRO 3/2020, postop resp failure with hypoxia, hx RA, CAD s/p stents 2012, 2014.   Htn, CKD stage 3.  CXR 8/2020 postop changes    Chief Complaint   Patient presents with   • COPD        History of Present Illness   Patient with history obstruction resected by wedge.  Latest chest x-ray does not show indications for recurrence.  He reports moderate intermittent shortness of breath in the chest on exertion alleviated by rest.  He is tried some inhalers in the past but has not found any that help.  This is been going on for several years.  He says his wife notes that sometimes looks like he is breathing hard.  He is also noticed some dermatologic condition which is gotten worse since he had his surgery last year.  He had his follow-up imaging which was significant only for postoperative changes.  He has no other acute complaints.    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   has a past surgical history that includes Hernia repair; Colonoscopy (03/22/2016); and Colonoscopy (N/A, 5/1/2019).   reports that he quit smoking about 6 months ago. His smoking use included cigarettes. He has a 65.00 pack-year smoking history. He has quit using smokeless tobacco. He reports that he does not drink alcohol or use drugs.  Allergies   Allergen Reactions   • Sulfamethoxazole-Trimethoprim Other (See Comments)     He was told at the hospital he was allergic to this medication. \"affected his kidneys\"     Medications    Current Outpatient Medications:   •  allopurinol (ZYLOPRIM) 100 MG tablet, Take 100 mg by mouth Daily., Disp: , Rfl:   •  amLODIPine (NORVASC) 5 MG tablet, Take 5 mg by mouth 2 times daily, Disp: , Rfl:   •  aspirin 81 MG EC tablet, Take 81 mg by mouth Daily., " "Disp: , Rfl:   •  atenolol (TENORMIN) 25 MG tablet, Take 25 mg by mouth daily., Disp: , Rfl:   •  atorvastatin (LIPITOR) 10 MG tablet, Take 10 mg by mouth daily., Disp: , Rfl:   •  Cobalamine Combinations (B-12) 100-5000 MCG sublingual tablet, Place  under the tongue., Disp: , Rfl:   •  gabapentin (NEURONTIN) 300 MG capsule, Take 300 mg by mouth 2 times daily., Disp: , Rfl:   •  Glycopyrrolate-Formoterol (Bevespi Aerosphere) 9-4.8 MCG/ACT aerosol, Inhale 2 sprays 2 (Two) Times a Day for 14 days., Disp: 1 inhaler, Rfl: 0  •  hydrALAZINE (APRESOLINE) 10 MG tablet, Take 10 mg by mouth 2 times daily, Disp: , Rfl:   •  hydrOXYzine (ATARAX) 10 MG tablet, Take 10 mg by mouth daily, Disp: , Rfl:   •  losartan (COZAAR) 25 MG tablet, Take 25 mg by mouth, Disp: , Rfl:   •  nitroglycerin (NITROSTAT) 0.4 MG SL tablet, Place 0.4 mg under the tongue every 5 minutes as needed., Disp: , Rfl:   •  Omega 3 1200 MG capsule, Take 1 capsule by mouth., Disp: , Rfl:   •  omeprazole (priLOSEC) 20 MG capsule, Take 20 mg by mouth daily., Disp: , Rfl:   •  prasugrel (EFFIENT) 10 MG tablet, Take 10 mg by mouth daily., Disp: , Rfl:   •  sodium bicarbonate 650 MG tablet, Take 650 mg by mouth 3 times daily., Disp: , Rfl:   •  tamsulosin (FLOMAX) 0.4 MG capsule 24 hr capsule, Take 0.4 mg by mouth daily., Disp: , Rfl:   •  vitamin D (ERGOCALCIFEROL) 1.25 MG (67119 UT) capsule capsule, Take 50,000 Units by mouth Every 30 (Thirty) Days., Disp: , Rfl:     Review of Systems   Constitutional: Negative for chills and fever.   Cardiovascular: Negative for chest pain.   Gastrointestinal: Negative for nausea and vomiting.       Objective   /84   Pulse 90   Temp 98.1 °F (36.7 °C)   Ht 170.2 cm (67\")   Wt 83 kg (183 lb)   SpO2 93% Comment: RA  BMI 28.66 kg/m²   Physical Exam   Constitutional: He appears well-developed and well-nourished. He does not appear ill. No distress.   HENT:   Head: Normocephalic and atraumatic.   Eyes: Conjunctivae and EOM " are normal.   Neck: Neck supple.   Cardiovascular: Normal rate, regular rhythm, S1 normal and S2 normal.   Pulmonary/Chest: Effort normal. No accessory muscle usage. He has decreased breath sounds. He has no wheezes. He has no rales.   Abdominal: Soft. He exhibits no distension. There is no tenderness. There is no guarding.   Musculoskeletal: He exhibits no deformity.   Neurological: He is alert.   Skin: Skin is warm and dry. No rash noted.   Psychiatric: He has a normal mood and affect.       -----------------------------------------------------------------------------------------------  Exam: CT CHEST WO CONTRAST - 8/31/2020 7:13 AM  Indication: Z85.118  Comparison: Lung cancer status post thoracotomy  DLP: 320 mGy cm. In order to have a CT radiation dose as low as  reasonably achievable, Automated Exposure Control was utilized for  adjustment of the mA and/or KV according to patient size.  Findings:  Postoperative change of thoracotomy with LEFT upper lobe wedge  resection for resection of pulmonary nodule. There is mild soft tissue  thickening along the is wedge resection suture line which likely  represents scarring. 9 mm subpleural scarlike focus in the RIGHT lower  lobe on image 72 is unchanged. No new or enlarging pulmonary nodule.  There is a background of mild to moderate centrilobular emphysema. No  consolidation or pleural effusion.  No enlarged axillary, supraclavicular, mediastinal, or hilar lymph  nodes. Main pulmonary trunk and thoracic aorta are within normal  limits in caliber. Mild vascular calcification in the aortic arch.  Heavy coronary artery calcification. No pericardial effusion.  Uniform thyroid. No acute soft tissue finding. No acute finding in the  upper abdomen. No aggressive bone lesion. Healing LEFT second rib  defect. Multilevel thoracic spine degenerative change.  IMPRESSION:  Postoperative change of thoracotomy with LEFT upper lobe wedge  resection. No evidence of residual or  metastatic disease. There is  nodular thickening along the wedge resection line which is likely  postoperative scarring but recommend continued imaging surveillance.  Signed by Dr Gurpreet Dennis on 8/31/2020 8:56 AM     -----------------------------------------------------------------------------------------------  PFT Values        Some values may be hidden. Unless noted otherwise, only the newest values recorded on each date are displayed.         Old Values PFT Results 2/4/20   No data to display.      Pre Drug PFT Results 2/4/20   FVC 87   FEV1 76   FEF 25-75% 42   FEV1/FVC 67.35      Post Drug PFT Results 2/4/20   No data to display.      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 BMI is within normal parameters. No follow-up required..      Patient seems to be stable from an oncology standpoint.  Latest CT of the chest at the end of last month at [facility] is negative.  We will continue to follow him with intermittent imaging.  He did have a wedge resection.  COPD is at least moderate based on his latest spirometry.  He has not responded well to other inhalers in the past.  I do have some Bevespi samples and will give him a trial of that, 2 puffs twice a day.  Coronavirus avoidance.  We will follow him up with spirometry in a few months.  We will get follow-up imaging next  year as well.  We will order that when he comes back in.       Electronically signed by Camron Borjas MD, 9/8/2020, 13:28

## 2021-03-06 NOTE — PROGRESS NOTES
"Background:  Pt with lung cancer resected by wedge resection SANDRO 3/2020, postop resp failure with hypoxia, hx RA, CAD s/p stents 2012, 2014.   Htn, CKD stage 3.  CXR 8/2020 postop changes   Chief Complaint  stage 2 mod copd and hx lung ca    Subjective    History of Present Illness {CC  Problem List  Visit  Diagnosis   Encounters  Notes  Medications  Media :23}     Hoang Renteria presents to [facility].  He says breathing is doing ok.  He cant do a lot of walking without having to stop and rest.  Breathing gets hard and legs get hurting but he stops and rests and goes again.  He has not had benefit from inhalers.  Dr. Weston did a cxr recently.  He is s/p wedge resection about 1 year ago       Objective     Vital Signs:   /82   Pulse 76   Temp 98.7 °F (37.1 °C)   Ht 170.2 cm (67\")   Wt 86.1 kg (189 lb 12.8 oz)   SpO2 94% Comment: ra  BMI 29.73 kg/m²   Physical Exam  Constitutional:       Appearance: Normal appearance. He is not ill-appearing.   Pulmonary:      Effort: No respiratory distress.      Breath sounds: Normal breath sounds. No wheezing.   Neurological:      Mental Status: He is alert and oriented to person, place, and time.        Result Review  Data Reviewed:{ Labs  Result Review  Imaging  Media :23}       PFT Values        Some values may be hidden. Unless noted otherwise, only the newest values recorded on each date are displayed.         Old Values PFT Results 2/4/20   No data to display.      Pre Drug PFT Results 2/4/20   FVC 87   FEV1 76   FEF 25-75% 42   FEV1/FVC 67.35      Post Drug PFT Results 2/4/20   No data to display.      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  Pt gets all his films at [facility], so we will order it there and will need to get a disc to review.  I told him to make sure we discuss result over phone.  Discussed potential for data transfer failure between computer systems at the 2 institutions.  Follow up in 6 months with spirometry.  Continue mobilization as much as he can.  Could benefit from pulm rehab once coronavirus pandemic improves.    Follow Up {Instructions Charge Capture  Follow-up Communications :23}   Return in about 6 months (around 9/9/2021) for full PFT.  I have asked him to get a disc of the new ct when it gets done at [facility] since we do not have access to the imaging facility there.  Patient was given instructions and counseling regarding his condition or for health maintenance advice. Please see specific information pulled into the AVS if appropriate.    Electronically signed by Camron Borjas MD, 3/9/2021, 14:35 CST

## 2021-03-09 ENCOUNTER — OFFICE VISIT (OUTPATIENT)
Dept: PULMONOLOGY | Facility: CLINIC | Age: 81
End: 2021-03-09

## 2021-03-09 VITALS
TEMPERATURE: 98.7 F | HEART RATE: 76 BPM | HEIGHT: 67 IN | BODY MASS INDEX: 29.79 KG/M2 | OXYGEN SATURATION: 94 % | SYSTOLIC BLOOD PRESSURE: 130 MMHG | WEIGHT: 189.8 LBS | DIASTOLIC BLOOD PRESSURE: 82 MMHG

## 2021-03-09 DIAGNOSIS — J44.9 STAGE 2 MODERATE COPD BY GOLD CLASSIFICATION (HCC): Primary | ICD-10-CM

## 2021-03-09 DIAGNOSIS — Z85.118 PERSONAL HISTORY OF LUNG CANCER: ICD-10-CM

## 2021-03-09 DIAGNOSIS — R06.02 SHORTNESS OF BREATH: ICD-10-CM

## 2021-03-09 DIAGNOSIS — Z87.891 PERSONAL HISTORY OF NICOTINE DEPENDENCE: ICD-10-CM

## 2021-03-09 PROCEDURE — 99214 OFFICE O/P EST MOD 30 MIN: CPT | Performed by: INTERNAL MEDICINE

## 2021-03-09 RX ORDER — FLUTICASONE PROPIONATE 50 MCG
2 SPRAY, SUSPENSION (ML) NASAL DAILY
COMMUNITY

## 2021-03-09 RX ORDER — LORATADINE 10 MG/1
10 TABLET ORAL
COMMUNITY
End: 2022-02-10

## 2021-03-09 RX ORDER — LEFLUNOMIDE 20 MG/1
20 TABLET ORAL DAILY
COMMUNITY

## 2021-03-09 RX ORDER — CLOBETASOL PROPIONATE 0.5 MG/G
CREAM TOPICAL 2 TIMES DAILY
COMMUNITY

## 2021-03-16 DIAGNOSIS — J44.9 STAGE 2 MODERATE COPD BY GOLD CLASSIFICATION (HCC): ICD-10-CM

## 2021-03-16 DIAGNOSIS — Z85.118 PERSONAL HISTORY OF LUNG CANCER: ICD-10-CM

## 2021-03-16 NOTE — PROGRESS NOTES
Please call the patient regarding his abnormal result. There is a small area which may just be some scar by the suture line from where his surgery was.  May just be scar but I am also concerned for possibility of tumor recurring in that area.  We need to review the pictures to decide what if anything we need to do next.  It may be just a matter of watching it and rechecking or maybe do some additional tests.  Have him get a cd of the pictures and work him in for an appointment  We can add him on at 1:30 to follow my 1:00 appointment on Friday afternoon.  I am not available after 2:00

## 2021-03-17 NOTE — PROGRESS NOTES
Spoke with patient and relayed test results. Patient voiced understanding. The disk is in Dr. Borjas's inbox and has an appointment on Friday at 1:30.

## 2021-03-19 ENCOUNTER — OFFICE VISIT (OUTPATIENT)
Dept: PULMONOLOGY | Facility: CLINIC | Age: 81
End: 2021-03-19

## 2021-03-19 VITALS
HEIGHT: 67 IN | OXYGEN SATURATION: 96 % | SYSTOLIC BLOOD PRESSURE: 130 MMHG | WEIGHT: 191 LBS | DIASTOLIC BLOOD PRESSURE: 82 MMHG | BODY MASS INDEX: 29.98 KG/M2 | HEART RATE: 70 BPM

## 2021-03-19 DIAGNOSIS — Z85.118 PERSONAL HISTORY OF LUNG CANCER: ICD-10-CM

## 2021-03-19 DIAGNOSIS — Z87.891 PERSONAL HISTORY OF NICOTINE DEPENDENCE: ICD-10-CM

## 2021-03-19 DIAGNOSIS — R91.1 NODULE OF UPPER LOBE OF LEFT LUNG: ICD-10-CM

## 2021-03-19 DIAGNOSIS — R93.429 ABNORMAL FINDING ON DIAGNOSTIC IMAGING OF KIDNEY: ICD-10-CM

## 2021-03-19 DIAGNOSIS — J44.9 STAGE 2 MODERATE COPD BY GOLD CLASSIFICATION (HCC): ICD-10-CM

## 2021-03-19 DIAGNOSIS — R91.1 LEFT UPPER LOBE PULMONARY NODULE: Primary | ICD-10-CM

## 2021-03-19 PROCEDURE — 99214 OFFICE O/P EST MOD 30 MIN: CPT | Performed by: INTERNAL MEDICINE

## 2021-03-19 NOTE — PROGRESS NOTES
"Background:  Pt with lung cancer resected by wedge resection SANDRO 3/2020, postop resp failure with hypoxia, hx RA, CAD s/p stents 2012, 2014.   Htn, CKD stage 3.  CXR 8/2020 postop changes   Chief Complaint  COPD and lung lesion    Subjective    History of Present Illness       Hoang Renteria presents to [facility].  He follows up with new findings of concern on ct ct chest done a few days ago.  We worked him in today.  Pt reports still some soreness related to prior surgery, chronic dyspnea on exertion in chest moderate severity alleviated by rest, now associated with new density reported on ct chest.  This otherwise is without associated symptoms..  He has had both coronavirus vaccines.       Objective     Vital Signs:   /82   Pulse 70   Ht 170.2 cm (67\")   Wt 86.6 kg (191 lb)   SpO2 96% Comment: RA  BMI 29.91 kg/m²   Physical Exam  Constitutional:       Appearance: Normal appearance. He is not ill-appearing or diaphoretic.   Eyes:      Extraocular Movements: Extraocular movements intact.   Pulmonary:      Effort: Pulmonary effort is normal. No respiratory distress.      Breath sounds: No wheezing or rales.   Abdominal:      General: There is no distension.   Musculoskeletal:         General: No swelling.      Right lower leg: No edema.      Left lower leg: No edema.   Skin:     Findings: No erythema or rash.   Neurological:      Mental Status: He is alert.   Psychiatric:         Mood and Affect: Mood normal.         Behavior: Behavior normal.        Result Review  Data Reviewed:{ Labs  Result Review  Imaging  Media :23}       EXAMINATION: CT CHEST WO CONTRAST 3/16/2021 10:15 AM   HISTORY: Personal history of lung cancer   Comparison: CT chest without contrast 8/31/2020 and 1/16/2020   Technique: Sequential imaging was performed from the thoracic inlet   through the upper abdomen without the use of IV contrast. Sagittal and   coronal reformations " were made from the original source data and   reviewed. Automated exposure control was utilized for radiation dose   reduction.   Radiation dose:  mGy-cm   Findings:   The visualized thyroid gland is grossly normal in appearance. The   trachea and main bronchi appear widely patent and in normal anatomic   position. The esophagus is grossly normal in appearance.   No pathologically enlarged axillary, mediastinal, or hilar lymph nodes   are identified when allowing for limitations of a noncontrast exam.   The heart appears normal in size. There is no pericardial or pleural   effusion. Atherosclerotic calcifications are seen in the aorta and its   branch vessels. Coronary artery calcifications are identified. The   ascending thoracic aorta and main pulmonary artery appear normal in   caliber.   There is suture material in the left upper lobe with adjacent   nodularity. The adjacent soft tissue nodularity has increased in size   since the prior exam, measuring 18 x 16 mm in size, previously   measuring 10 x 6 mm in size. There is some adjacent parenchymal   distortion, presumably related to scarring. There is a tiny 3 mm   nodule in the medial left upper lobe, more conspicuous on the current   exam (series 3 image 43). Several tiny nodules are seen along the left   major fissure, presumably tiny intrafissural lymph nodes. There are   dependent changes in the lung bases. There is mild diffuse bronchial   wall thickening. There is some atelectasis versus scarring and   bronchiectasis in the right middle lobe. There is a small   pleural-based nodule in the lateral right lower lobe which measures   approximately 8 mm in size, previously measuring 10 mm in size.   Review of the visualized portion of the upper abdomen demonstrates   multiple exophytic renal lesions, incompletely characterized on this   exam. At least one appears hyperdense, suggesting hemorrhagic or   proteinaceous cyst.   Review of the visualized  osseous structures demonstrates no acute or   aggressive lesions. Degenerative osteophyte formation is evident in   the spine.   IMPRESSION:   Impression:   1. Soft tissue nodule adjacent to suture material in the left upper   lobe is concerning for recurrent disease.   2. Multiple renal lesions, incompletely characterized on this exam.   Follow-up nonemergent renal ultrasound recommended.   3. Atherosclerosis of the aorta and coronary arteries.   Signed by Dr Brayan Whiting on 3/16/2021 10:25 AM    My interpretation of radiograph: nodule in L lung worrisome for recurrence, vs scar.    PFT Values        Some values may be hidden. Unless noted otherwise, only the newest values recorded on each date are displayed.         Old Values PFT Results 2/4/20   No data to display.      Pre Drug PFT Results 2/4/20   FVC 87   FEV1 76   FEF 25-75% 42   FEV1/FVC 67.35      Post Drug PFT Results 2/4/20   No data to display.      Other Tests PFT Results 2/4/20   DLCO 55   D/VAsb 52                      Assessment and Plan  {CC Problem List  Visit Diagnosis  ROS  Review (Popup)  Health Maintenance  Quality  BestPractice  Medications  SmartSets  SnapShot Encounters  Media :23}   Problem List Items Addressed This Visit        Pulmonary Problems    Stage 2 moderate COPD by GOLD classification (CMS/HCC)       Other    Personal history of nicotine dependence    Personal history of lung cancer    Overview     Wedge resection 3/2020         Abnormal finding on diagnostic imaging of kidney      Other Visit Diagnoses     Left upper lobe pulmonary nodule    -  Primary    Relevant Orders    NM Pet Skull Base To Mid Thigh    Nodule of upper lobe of left lung          we discussed the findings and reviewed the film  Primary and most substantial concern is possible recurrence of lung cancer in patient with prior smoking history..  Other possibilities include hypertrophic scarring at surgical site, reaction to coronavirus vaccine  (doubt, but brought up by pt), granuloma or other benign disease, metastatic lesion from renal lesions (also doubtful).  Will plan PET scan.  If abnormal and suggestive of malignancy, and no other sites identified, then he is a candidate for stereotactic radiosurgery.  If negative or equivocal, then ongoing follow up could be considered as an alternative.  Tissue diagnosis would likely require robotic bronchoscopy.  COPD is stable    Follow Up {Instructions Charge Capture  Follow-up Communications :23}   Return in about 2 months (around 5/19/2021).  Patient was given instructions and counseling regarding his condition or for health maintenance advice. Please see specific information pulled into the AVS if appropriate.    Electronically signed by Camron Borjas MD, 3/19/2021, 16:39 CDT

## 2021-03-31 DIAGNOSIS — R91.1 LEFT UPPER LOBE PULMONARY NODULE: Primary | ICD-10-CM

## 2021-04-01 NOTE — PROGRESS NOTES
Spoke with patient and relayed test results. Patient voiced understanding. Patient is agreeable to the referral to Dr. Bruner.

## 2021-04-02 PROBLEM — C34.12 MALIGNANT NEOPLASM OF UPPER LOBE OF LEFT LUNG: Status: ACTIVE | Noted: 2021-04-02

## 2021-04-06 ENCOUNTER — HOSPITAL ENCOUNTER (OUTPATIENT)
Dept: RADIATION ONCOLOGY | Facility: HOSPITAL | Age: 81
Setting detail: RADIATION/ONCOLOGY SERIES
End: 2021-04-06

## 2021-04-06 ENCOUNTER — DOCUMENTATION (OUTPATIENT)
Dept: PULMONOLOGY | Facility: CLINIC | Age: 81
End: 2021-04-06

## 2021-04-06 PROBLEM — Z90.2 STATUS POST LOBECTOMY OF LUNG: Status: ACTIVE | Noted: 2021-04-06

## 2021-04-06 PROBLEM — Z87.891 FORMER SMOKER: Status: ACTIVE | Noted: 2021-04-06

## 2021-04-06 PROBLEM — C34.92: Status: ACTIVE | Noted: 2021-04-02

## 2021-04-06 PROBLEM — J96.01 ACUTE HYPOXEMIC RESPIRATORY FAILURE (HCC): Status: ACTIVE | Noted: 2020-04-02

## 2021-04-06 PROBLEM — N18.30 STAGE 3 CHRONIC KIDNEY DISEASE (HCC): Status: ACTIVE | Noted: 2021-04-06

## 2021-04-06 PROBLEM — J44.9 CHRONIC OBSTRUCTIVE PULMONARY DISEASE: Status: ACTIVE | Noted: 2020-04-14

## 2021-04-06 PROBLEM — H91.90 HEARING LOSS: Status: ACTIVE | Noted: 2017-09-18

## 2021-04-06 NOTE — PROGRESS NOTES
[facility] Francia Dye BSN, PA-C  ____________________________________________________________               [facility]  Department of Radiation Oncology  [address] [place] [id number]  Office:  [phone]  Fax: [phone]    DATE: 04/07/2021  PATIENT: Hoang Renteria 1940   Medical record: [id number]                                                     REASON FOR CONSULTATION:   Chief Complaint   Patient presents with   • Appointment     Lung nodule     Hoang Renteria is a 80 y.o. female that has been referred to our clinic to be evaluated for consideration of radiotherapy to the lung for recurrent lung carcinoma.   Reports seasonal allergies and SOB. Denies activity change, appetite change, unexpected weight change, nausea/vomiting, diarrhea, light-headedness, weakness, and headaches. He follows .     HISTORY OF PRESENT ILLNESS  Diagnosed in March 2020 with Adenocarcinoma of the Lung, SANDRO,1.3 cm.Underwent SANDRO wedge resection on 03/31/2020, pathology; moderately differentiated lung adenocarcinoma, margins negative, 1.3 cm. Negative for evidence of pleural invasion. AJCC STAGE: pT1a, pNx, pMx   · 08/31/2020 - CT Chest without contrast: nodular thickening along the wedge resection line which is likely postoperative scarring   · RECURRENCE: 03/16/2021 - CT Chest without contrast; adjacent soft tissue nodularity has increased, 18 x 16 mm, previously 10 x 6 mm and a 3 mm nodule in the medial left upper lobe, more conspicuous on the current exam. PET; SANDRO 18 mm nodule SUV 5.9. Follows Dr. Borjas, referred for SBRT    06/22/2018 - CT Lung screening (Annual):  • Lung rads category 4A-there are several foci of nodularity within the lung. The largest of the nodules measure 6 mm in size in the the right lower lobe.   • There is also a focus of nodularity within the inferior lingular segment of the left upper lobe which radiographically  has the appearance of atelectasis and/or scarring.   • The nodular component does measure approximately 1.5 cm in long axis and I feel would categorize this patient into a 4a category.   • Follow-up low dose CT in 3 months is recommended to assure stability of this particular finding.     09/24/2018 - CT Lung screening (Annual):  • Lung rads category   • Overall stable appearance of the nodules from the previous study of 6/22/2018 except for interval diminishment in size of a focus of atelectasis/scarring within the lingular segment of the left upper lobe.   • No new nodules are present.   • Follow-up low dose CT imaging in 12 months is recommended.     01/16/2020 - CT Chest with and without contrast:  • Left upper lobe with a 14 x 7 mm pulmonary nodule, previously 6 mm on 9/24/2018.   • Right lower lobe with 7 mm pleural-based pulmonary nodule, previously 7 mm on 9/24/2018.   Impression:  • Increased size of left upper lobe pulmonary nodule now measuring 14 x 7 mm, previously 6 mm on 9/24/2018. This is concerning for malignancy. Consider PET/CT or tissue sampling for further evaluation.   • No lymphadenopathy.   • Coronary artery, aortic and branch vessel atherosclerosis.     01/24/2020 - PET Scan:  • Left upper lobe pulmonary nodule, only mildly hypermetabolic with a maximum density of 1.87 SUV, equivocal for malignancy. Follow-up recommended.   • No scintigraphic evidence of hypermetabolic neoplastic disease.     02/04/2020 - Pulmonary Function tests:  • FVC - 87   • FEV1 - 76   • FEF 25-75% - 42   • FEV1/FVC - 67.35   • DLCO - 55   • D/VAsb - 52     02/04/2020 - Appointment with :  • The series of images is reviewed.   • I am very concerned about the progressive growth, despite the equivocal pet result, with risk for lung cancer.   • Refer to CT surgery for eval for wedge +/- lobectomy. FEV1 ok, although DLCO is reduced.I am giving him a sample of anoro and we showed him how to use it. I do not know why  epic did not embed that action under plan above, but I can't get the program to do that.   • Recommend smoking abstinence.   • On anticoagulation which will need to be held prior to surgery.    03/31/2020 - Lung, left upper lobe wedge resection - per :  • Moderately differentiated lung adenocarcinoma, margins negative.   • Carcinoma measures 1.3 cm in greatest dimension.   • Negative for evidence of pleural invasion.   • Surgical excision margins are negative for evidence of malignancy.   • Mild subpleural emphysematous changes involving nonneoplastic lung parenchyma.   AJCC STAGE: pT1a, pNx, pMx     05/06/2020 - Appointment with :  • We will plan follow-up in September with repeat chest CT as well as pulmonary function testing then.  We will try to see him for a physical real visit in the office.    • Unable to do physical exam over the phone other than just listening to his voice.    • His voice is strong and able to complete full sentences and is in good spirits and good humor.    08/31/2020 - CT Chest without contrast:  • Postoperative change of thoracotomy with LEFT upper lobe wedge resection.   • No evidence of residual or metastatic disease.   • There is nodular thickening along the wedge resection line which is likely postoperative scarring but recommend continued imaging surveillance.     09/08/2020 - Appointment with :  • Patient seems to be stable from an oncology standpoint.  Latest CT of the chest at the end of last month at [facility] is negative.  We will continue to follow him with intermittent imaging.  He did have a wedge resection.  COPD is at least moderate based on his latest spirometry.    • He has not responded well to other inhalers in the past.    • I do have some Bevespi samples and will give him a trial of that, 2 puffs twice a day.  Coronavirus avoidance.    • We will follow him up with spirometry in a few months.    • We will get follow-up imaging next year  as well.    • We will order that when he comes back in.    03/09/2021 - Appointment with :  • Will plan follow up ct. Pt gets all his films at [facility], so we will order it there and will need to get a disc to review. I told him to make sure we discuss result over phone.   • Discussed potential for data transfer failure between computer systems at the 2 institutions.   • Follow up in 6 months with spirometry. Continue mobilization as much as he can.   • Could benefit from pulm rehab once coronavirus pandemic improves.    03/16/2021 - CT Chest without contrast:  • There is suture material in the left upper lobe with adjacent nodularity. The adjacent soft tissue nodularity has increased insize since the prior exam, measuring 18 x 16 mm in size, previously measuring 10 x 6 mm in size. There is some adjacent parenchymal distortion, presumably related to scarring.   • There is a tiny 3 mm nodule in the medial left upper lobe, more conspicuous on the current exam (series 3 image 43).  • Several tiny nodules are seen along the left major fissure, presumably tiny intrafissural lymph nodes. There are dependent changes in the lung bases.   • There is mild diffuse bronchial wall thickening.   • There is some atelectasis versus scarring and bronchiectasis in the right middle lobe.   • There is a small pleural-based nodule in the lateral right lower lobe which measures approximately 8 mm in size, previously measuring 10 mm in size.   • Review of the visualized portion of the upper abdomen demonstrates multiple exophytic renal lesions, incompletely characterized on this exam.   • At least one appears hyperdense, suggesting hemorrhagic or proteinaceous cyst.   Impression:  • Soft tissue nodule adjacent to suture material in the left upper lobe is concerning for recurrent disease.   • Multiple renal lesions, incompletely characterized on this exam. Follow-up nonemergent renal ultrasound recommended.   • Atherosclerosis  of the aorta and coronary arteries.    03/19/2021 - Appointment with :  • We discussed the findings and reviewed the film Primary and most substantial concern is possible recurrence of lung cancer in patient with prior smoking history.  • Other possibilities include hypertrophic scarring at surgical site, reaction to coronavirus vaccine (doubt, but brought up by pt), granuloma or other benign disease, metastatic lesion from renal lesions (also doubtful).   • Will plan PET scan.   • If abnormal and suggestive of malignancy, and no other sites identified, then he is a candidate for stereotactic radiosurgery.   • If negative or equivocal, then ongoing follow up could be considered as an alternative.   • Tissue diagnosis would likely require robotic bronchoscopy.   • COPD is stable    03/31/2021 - PET Scan:  • An 18 mm nodule in the left upper lobe demonstrates an SUV of 5.9.  • Review of the mediastinum reveals no hypermetabolic lymphadenopathy.   Impression:  • Solitary left upper lobe pulmonary nodule with abnormal SUV 5.9 allograft   • There are no other regions of abnormal metabolic activity.     History obtained from  PATIENT, FAMILY, and CHART    PAST MEDICAL HISTORY  Past Medical History:   Diagnosis Date   • Hyperlipidemia    • Hypertension    • Shortness of breath 2/4/2020   • Stage 2 moderate COPD by GOLD classification (CMS/HCC) 2/4/2020      PAST SURGICAL HISTORY  Past Surgical History:   Procedure Laterality Date   • COLONOSCOPY  03/22/2016    three polyps  all removed   • COLONOSCOPY N/A 5/1/2019    Procedure: COLONOSCOPY WITH ANESTHESIA;  Surgeon: Reddy Ann DO;  [facility] ENDOSCOPY;  Service: Gastroenterology   • HERNIA REPAIR        FAMILY HISTORY  family history includes Cancer in his mother.     SOCIAL HISTORY  Social History     Tobacco Use   • Smoking status: Former Smoker     Packs/day: 1.00     Years: 65.00     Pack years: 65.00     Types: Cigarettes     Quit date:  2/10/2020     Years since quittin.1   • Smokeless tobacco: Former User   Substance Use Topics   • Alcohol use: No   • Drug use: No      ALLERGIES  Sulfamethoxazole-trimethoprim     MEDICATIONS    Current Outpatient Medications:   •  allopurinol (ZYLOPRIM) 100 MG tablet, Take 100 mg by mouth Daily., Disp: , Rfl:   •  amLODIPine (NORVASC) 5 MG tablet, Take 5 mg by mouth 2 times daily, Disp: , Rfl:   •  atenolol (TENORMIN) 25 MG tablet, Take 25 mg by mouth daily., Disp: , Rfl:   •  atorvastatin (LIPITOR) 10 MG tablet, Take 10 mg by mouth 3 (Three) Times a Week. mon-wed-fri, Disp: , Rfl:   •  clobetasol (TEMOVATE) 0.05 % cream, Apply  topically to the appropriate area as directed 2 (Two) Times a Day., Disp: , Rfl:   •  Cobalamine Combinations (B-12) 100-5000 MCG sublingual tablet, Place  under the tongue., Disp: , Rfl:   •  fluticasone (FLONASE) 50 MCG/ACT nasal spray, 2 sprays into the nostril(s) as directed by provider Daily., Disp: , Rfl:   •  gabapentin (NEURONTIN) 300 MG capsule, Take 300 mg by mouth 2 times daily., Disp: , Rfl:   •  hydrALAZINE (APRESOLINE) 10 MG tablet, Take 10 mg by mouth 2 times daily, Disp: , Rfl:   •  hydrOXYzine (ATARAX) 10 MG tablet, Take 10 mg by mouth daily, Disp: , Rfl:   •  leflunomide (ARAVA) 20 MG tablet, Take 20 mg by mouth Daily., Disp: , Rfl:   •  loratadine (Claritin) 10 MG tablet, Take 10 mg by mouth., Disp: , Rfl:   •  losartan (COZAAR) 25 MG tablet, Take 25 mg by mouth, Disp: , Rfl:   •  nitroglycerin (NITROSTAT) 0.4 MG SL tablet, Place 0.4 mg under the tongue every 5 minutes as needed., Disp: , Rfl:   •  Omega 3 1200 MG capsule, Take 1 capsule by mouth., Disp: , Rfl:   •  omeprazole (priLOSEC) 20 MG capsule, Take 20 mg by mouth daily., Disp: , Rfl:   •  phenylephrine-mineral oil-petrolatum (PREPARATION H) 0.25-14-74.9 % ointment hemorrhoidal ointment, Insert  into the rectum 3 (Three) Times a Day As Needed., Disp: , Rfl:   •  prasugrel (EFFIENT) 10 MG tablet, Take 5 mg by  "mouth Daily., Disp: , Rfl:   •  sodium bicarbonate 650 MG tablet, Take 650 mg by mouth 3 times daily., Disp: , Rfl:   •  tamsulosin (FLOMAX) 0.4 MG capsule 24 hr capsule, Take 0.4 mg by mouth daily., Disp: , Rfl:   •  vitamin D (ERGOCALCIFEROL) 1.25 MG (34757 UT) capsule capsule, Take 50,000 Units by mouth Every 30 (Thirty) Days., Disp: , Rfl:   •  aspirin 81 MG EC tablet, Take 81 mg by mouth Daily., Disp: , Rfl:     Current outpatient and discharge medications have been reconciled for the patient.  Reviewed by: Luis Felipe Bruner III, MD    Cancer-related family history includes Cancer in his mother. There is no history of Colon cancer or Esophageal cancer.    The following portions of the patient's history were reviewed and updated as appropriate: allergies, current medications, past family history, past medical history, past social history, past surgical history and problem list.    REVIEW OF SYSTEMS  Review of Systems   Constitutional: Negative.    HENT:          Seasonal allergies   Eyes: Negative.    Respiratory: Positive for shortness of breath. Negative for chest tightness, cough, hemoptysis and wheezing.    Cardiovascular: Negative.    Gastrointestinal: Negative.    Endocrine: Negative.    Genitourinary: Negative.     Musculoskeletal: Negative.         Psoriatic arthritis   Skin: Negative.    Neurological: Negative.    Hematological: Negative.    Psychiatric/Behavioral: Negative.      I have reviewed and confirmed the accuracy of the ROS as documented by the MA/LPN/RN Luis Felipe Bruner III, MD    PHYSICAL EXAM  VITAL SIGNS:   Vitals:    04/07/21 1014   BP: 141/64   Pulse: 58   Resp: 20   SpO2: 94%  Comment: Room Air   Weight: 86.2 kg (190 lb)   Height: 170.2 cm (67\")   PainSc: 0-No pain      General:  Alert and oriented, no acute distress, well developed, Vitals reviewed.  Head:  Normocephalic, without obvious abnormality    Nose/Sinuses:  Nares normal externally, no sinus tenderness.  Mouth/Throat:  Mucosa " "moist, pharynx without erythema  Neck:  supple, No evidence of adenopathy in the cervical or supraclavicular areas.  Eyes: No gross abnormalities   Ears: Ears intact with no external abnormalities noted.   Chest:   Cardiovascular: Regular rate and rhythm   Abdomen:  Soft, non-tender, normal bowel sounds; no CVA tenderness   Extremities:  JON well, warm to touch, no evidence of cyanosis or edema.  Skin: No suspicious lesions or rashes of concern, multiple areas on scalp and arms, \"psoriasis\" per patient  Neurologic:  Alert and oriented, non focal exam, strength and sensation grossly normal  Psych: Mood and affect are appropriate    Performance Status: ECOG (0) Fully active, able to carry on all predisease performance without restriction    Clinical Quality Measures  -Pain Documented by Standardized Tool, FPS Hoang Renteria reports a pain score of 0. Given his pain assessment as noted, treatment options were discussed and the following options were decided upon as a follow-up plan to address the patient's pain: No pain, no plan given.     Pain Medications             gabapentin (NEURONTIN) 300 MG capsule Take 300 mg by mouth 2 times daily.    leflunomide (ARAVA) 20 MG tablet Take 20 mg by mouth Daily.    aspirin 81 MG EC tablet Take 81 mg by mouth Daily.        -Advanced Care Planning   Advance Care Planning   ACP discussion was held with the patient during this visit. Patient does not have an advance directive, information provided.    -Body Mass Index Screening and Follow-Up Plan Patient's Body mass index is 29.76 kg/m². BMI is above normal parameters. Recommendations include: educational material.  -Tobacco Use: Screening and Cessation Intervention Social History    Tobacco Use      Smoking status: Former Smoker        Packs/day: 1.00        Years: 65.00        Pack years: 65        Types: Cigarettes        Quit date: 2/10/2020        Years since quittin.1      Smokeless tobacco: Former User     ASSESSMENT " AND PLAN  1. Recurrent carcinoma of left lung (CMS/HCC)    2. Status post lobectomy of lung    3. Acute hypoxemic respiratory failure (CMS/HCC)    4. Stage 2 moderate COPD by GOLD classification (CMS/HCC)    5. Former smoker      Orders Placed This Encounter   Procedures   • Ambulatory Referral to ONC Social Work     RECOMMENDATIONS: Hoang Renteria was diagnosed in March 2020 with Adenocarcinoma of the Lung, SANDRO,1.3 cm.Underwent SANDRO wedge resection on 03/31/2020, pathology; moderately differentiated lung adenocarcinoma, margins negative, 1.3 cm. Negative for evidence of pleural invasion. AJCC STAGE: pT1a, pNx, pMx   · 08/31/2020 - CT Chest without contrast: nodular thickening along the wedge resection line which is likely postoperative scarring   · RECURRENCE: 03/16/2021 - CT Chest without contrast; adjacent soft tissue SANDRO nodularity increased, 18 x 16 mm, previously 10 x 6 mm and a 3 mm nodule in the medial left upper lobe, more conspicuous on the current exam. PET; SANDRO 18 mm nodule SUV 5.9. Follows Dr. Borjas, referred for SBRT    The indications and rationale of lung stereotactic/external beam radiation therapy according to the NCCN Guidelines has been discussed today. I have extensively reviewed the risks, benefits and alternatives of therapy with this diagnosis. The risks of radiation therapy includes but is not limited to radiation induced pulmonary fibrosis, progression of disease in spite of therapy with either local or systemic failure. I have seen, examined and reviewed this patient's medication list, appropriate labs and imaging studies as well as other physician notes. We discussed the goals and plans of care with the patient and family and answered all questions.    Pulmonary function tests in February 2020 prior to lung resection in March 2020; FEV1 76%     Following this discussion and in consideration of the diagnostic data/evaluation of the patient, I recommended a course of stereotactic  radiosurgery to the SANDRO nodule, will simulate treatment fields today, 3D CT with MIPS to begin the planning process, final course pending.    Continue ongoing management per primary care physician and other specialists. Thank you for allowing me to assist in this patients care.     Patient Instructions   1)  Plan on 4-5 radiation treatments over about 2 weeks  2)  Very little in the way of side effects  3)  We will follow with CT scans of thorax every 3 months after treatment    Time Spent: I spent 62 minutes caring for Hoang on this date of service. This time includes time spent by me in the following activities: preparing for the visit, reviewing tests, obtaining and/or reviewing a separately obtained history, performing a medically appropriate examination and/or evaluation, counseling and educating the patient/family/caregiver and documenting information in the medical record.   Luis Felipe Bruner III, MD  04/07/2021

## 2021-04-06 NOTE — PROGRESS NOTES
Received disc of Elida PET scan dated 3/31/21.  My review of images shows hypermetabolism in SANDRO lesion, otherwise negative.  Report indicates same opinion.  Electronically signed by Camron Borjas MD on 4/6/2021, at 13:02 CDT

## 2021-04-07 ENCOUNTER — DOCUMENTATION (OUTPATIENT)
Dept: RADIATION ONCOLOGY | Facility: HOSPITAL | Age: 81
End: 2021-04-07

## 2021-04-07 ENCOUNTER — CONSULT (OUTPATIENT)
Dept: RADIATION ONCOLOGY | Facility: HOSPITAL | Age: 81
End: 2021-04-07

## 2021-04-07 ENCOUNTER — HOSPITAL ENCOUNTER (OUTPATIENT)
Dept: RADIATION ONCOLOGY | Facility: HOSPITAL | Age: 81
Setting detail: RADIATION/ONCOLOGY SERIES
Discharge: HOME | End: 2021-04-07

## 2021-04-07 VITALS
BODY MASS INDEX: 29.82 KG/M2 | HEART RATE: 58 BPM | WEIGHT: 190 LBS | SYSTOLIC BLOOD PRESSURE: 141 MMHG | DIASTOLIC BLOOD PRESSURE: 64 MMHG | RESPIRATION RATE: 20 BRPM | HEIGHT: 67 IN | OXYGEN SATURATION: 94 %

## 2021-04-07 DIAGNOSIS — J44.9 STAGE 2 MODERATE COPD BY GOLD CLASSIFICATION (HCC): ICD-10-CM

## 2021-04-07 DIAGNOSIS — Z87.891 FORMER SMOKER: ICD-10-CM

## 2021-04-07 DIAGNOSIS — C34.92 RECURRENT CARCINOMA OF LEFT LUNG (HCC): Primary | ICD-10-CM

## 2021-04-07 DIAGNOSIS — Z90.2 STATUS POST LOBECTOMY OF LUNG: ICD-10-CM

## 2021-04-07 DIAGNOSIS — J96.01 ACUTE HYPOXEMIC RESPIRATORY FAILURE (HCC): ICD-10-CM

## 2021-04-07 PROCEDURE — 77334 RADIATION TREATMENT AID(S): CPT | Performed by: RADIOLOGY

## 2021-04-07 NOTE — PROGRESS NOTES
JACINDA met with Mr. Renteria due to his distress score of 4. He is a 80 year old male who is here for a radiation consultation for recurrent carcinoma of left lung. JACINDA introduced self and explained role and source of support. He lives with his wife. His support system includes his wife, sister, and son. He plans to drive himself to treatments and he does not have any financial concerns at this time. He is independent with his ADLs. He said his stressors include pain and was a little bit nervous to hear the recommendations. Emotional support provided. He said his pain is due to arthritis but states he does not take any pain medication and does not want to. He denies needing any assistance and will contact this writer if assistance is needed in the future.

## 2021-04-07 NOTE — PATIENT INSTRUCTIONS
1)  Plan on 4-5 radiation treatments over about 2 weeks  2)  Very little in the way of side effects  3)  We will follow with CT scans of thorax every 3 months after treatment

## 2021-04-13 ENCOUNTER — HOSPITAL ENCOUNTER (OUTPATIENT)
Dept: RADIATION ONCOLOGY | Facility: HOSPITAL | Age: 81
Setting detail: RADIATION/ONCOLOGY SERIES
Discharge: HOME | End: 2021-04-13

## 2021-04-15 PROCEDURE — 77300 RADIATION THERAPY DOSE PLAN: CPT | Performed by: RADIOLOGY

## 2021-04-15 PROCEDURE — 77293 RESPIRATOR MOTION MGMT SIMUL: CPT | Performed by: RADIOLOGY

## 2021-04-15 PROCEDURE — 77301 RADIOTHERAPY DOSE PLAN IMRT: CPT | Performed by: RADIOLOGY

## 2021-04-15 PROCEDURE — 77338 DESIGN MLC DEVICE FOR IMRT: CPT | Performed by: RADIOLOGY

## 2021-04-27 ENCOUNTER — HOSPITAL ENCOUNTER (OUTPATIENT)
Dept: RADIATION ONCOLOGY | Facility: HOSPITAL | Age: 81
Setting detail: RADIATION/ONCOLOGY SERIES
Discharge: HOME | End: 2021-04-27

## 2021-04-27 PROCEDURE — 77373 STRTCTC BDY RAD THER TX DLVR: CPT | Performed by: RADIOLOGY

## 2021-04-29 ENCOUNTER — HOSPITAL ENCOUNTER (OUTPATIENT)
Dept: RADIATION ONCOLOGY | Facility: HOSPITAL | Age: 81
Setting detail: RADIATION/ONCOLOGY SERIES
Discharge: HOME | End: 2021-04-29

## 2021-04-29 PROCEDURE — 77373 STRTCTC BDY RAD THER TX DLVR: CPT | Performed by: RADIOLOGY

## 2021-05-03 ENCOUNTER — HOSPITAL ENCOUNTER (OUTPATIENT)
Dept: RADIATION ONCOLOGY | Facility: HOSPITAL | Age: 81
Setting detail: RADIATION/ONCOLOGY SERIES
End: 2021-05-03

## 2021-05-05 ENCOUNTER — HOSPITAL ENCOUNTER (OUTPATIENT)
Dept: RADIATION ONCOLOGY | Facility: HOSPITAL | Age: 81
Setting detail: RADIATION/ONCOLOGY SERIES
Discharge: HOME | End: 2021-05-05

## 2021-05-05 PROCEDURE — 77373 STRTCTC BDY RAD THER TX DLVR: CPT | Performed by: RADIOLOGY

## 2021-05-06 PROCEDURE — 77336 RADIATION PHYSICS CONSULT: CPT | Performed by: RADIOLOGY

## 2021-05-07 ENCOUNTER — HOSPITAL ENCOUNTER (OUTPATIENT)
Dept: RADIATION ONCOLOGY | Facility: HOSPITAL | Age: 81
Setting detail: RADIATION/ONCOLOGY SERIES
Discharge: HOME | End: 2021-05-07

## 2021-05-07 PROCEDURE — 77373 STRTCTC BDY RAD THER TX DLVR: CPT | Performed by: RADIOLOGY

## 2021-05-19 ENCOUNTER — OFFICE VISIT (OUTPATIENT)
Dept: PULMONOLOGY | Facility: CLINIC | Age: 81
End: 2021-05-19

## 2021-05-19 VITALS
WEIGHT: 188 LBS | HEART RATE: 71 BPM | OXYGEN SATURATION: 95 % | HEIGHT: 67 IN | DIASTOLIC BLOOD PRESSURE: 86 MMHG | BODY MASS INDEX: 29.51 KG/M2 | SYSTOLIC BLOOD PRESSURE: 122 MMHG

## 2021-05-19 DIAGNOSIS — C34.92 RECURRENT CARCINOMA OF LEFT LUNG (HCC): ICD-10-CM

## 2021-05-19 DIAGNOSIS — J44.9 STAGE 2 MODERATE COPD BY GOLD CLASSIFICATION (HCC): Chronic | ICD-10-CM

## 2021-05-19 DIAGNOSIS — Z85.118 PERSONAL HISTORY OF LUNG CANCER: Primary | ICD-10-CM

## 2021-05-19 DIAGNOSIS — Z87.891 PERSONAL HISTORY OF NICOTINE DEPENDENCE: ICD-10-CM

## 2021-05-19 PROCEDURE — 99213 OFFICE O/P EST LOW 20 MIN: CPT | Performed by: NURSE PRACTITIONER

## 2021-06-01 ENCOUNTER — HOSPITAL ENCOUNTER (OUTPATIENT)
Dept: RADIATION ONCOLOGY | Facility: HOSPITAL | Age: 81
Setting detail: RADIATION/ONCOLOGY SERIES
End: 2021-06-01

## 2021-07-20 ENCOUNTER — TELEPHONE (OUTPATIENT)
Dept: PULMONOLOGY | Facility: CLINIC | Age: 81
End: 2021-07-20

## 2021-07-20 DIAGNOSIS — R93.429 ABNORMAL FINDING ON DIAGNOSTIC IMAGING OF KIDNEY: Primary | ICD-10-CM

## 2021-07-20 DIAGNOSIS — R91.1 LEFT UPPER LOBE PULMONARY NODULE: ICD-10-CM

## 2021-07-20 NOTE — TELEPHONE ENCOUNTER
"Left message for patient to  the order for the CT scan.  Put order up front in the \"to be picked up\" folder  "

## 2021-07-20 NOTE — TELEPHONE ENCOUNTER
They have an appt with Dr. Bruner (august 9th) and he is wanting us to order a CT scan to be done before that appt.   Will you order this to be done a [facility].   Patient's wife will come by and get the order.

## 2021-08-02 DIAGNOSIS — R91.1 LEFT UPPER LOBE PULMONARY NODULE: ICD-10-CM

## 2021-08-02 NOTE — PROGRESS NOTES
Let pt know this looked ok.  Stable findings.  Nothing else to do for now.  Keep follow up as planned

## 2021-08-05 ENCOUNTER — TELEPHONE (OUTPATIENT)
Dept: PULMONOLOGY | Facility: CLINIC | Age: 81
End: 2021-08-05

## 2021-08-05 NOTE — TELEPHONE ENCOUNTER
Patient's wife is wanting to if they can cancel the august appt since they have one with you in Sept with a PFT.

## 2021-08-06 NOTE — TELEPHONE ENCOUNTER
Patient notified.         Faye please cancel the August appt but keep the Sept appt.   No need to call the pateint.

## 2021-08-08 PROBLEM — Z92.3 HISTORY OF RADIATION THERAPY: Status: ACTIVE | Noted: 2021-08-08

## 2021-08-09 ENCOUNTER — HOSPITAL ENCOUNTER (OUTPATIENT)
Dept: RADIATION ONCOLOGY | Facility: HOSPITAL | Age: 81
Setting detail: RADIATION/ONCOLOGY SERIES
End: 2021-08-09

## 2021-08-09 ENCOUNTER — OFFICE VISIT (OUTPATIENT)
Dept: RADIATION ONCOLOGY | Facility: HOSPITAL | Age: 81
End: 2021-08-09

## 2021-08-09 VITALS
HEIGHT: 67 IN | WEIGHT: 183.6 LBS | DIASTOLIC BLOOD PRESSURE: 69 MMHG | SYSTOLIC BLOOD PRESSURE: 136 MMHG | BODY MASS INDEX: 28.82 KG/M2

## 2021-08-09 DIAGNOSIS — Z87.891 FORMER SMOKER: ICD-10-CM

## 2021-08-09 DIAGNOSIS — C34.92 RECURRENT CARCINOMA OF LEFT LUNG (HCC): Primary | ICD-10-CM

## 2021-08-09 DIAGNOSIS — Z90.2 STATUS POST LOBECTOMY OF LUNG: ICD-10-CM

## 2021-08-09 DIAGNOSIS — J96.01 ACUTE HYPOXEMIC RESPIRATORY FAILURE (HCC): ICD-10-CM

## 2021-08-09 DIAGNOSIS — Z92.3 HISTORY OF RADIATION THERAPY: ICD-10-CM

## 2021-08-09 DIAGNOSIS — J44.9 STAGE 2 MODERATE COPD BY GOLD CLASSIFICATION (HCC): ICD-10-CM

## 2021-08-09 PROCEDURE — G0463 HOSPITAL OUTPT CLINIC VISIT: HCPCS | Performed by: RADIOLOGY

## 2021-11-09 NOTE — PROGRESS NOTES
[facility] Bruner MD      Charlie Francis, APRN  ____________________________________________________________  [facility]  Department of Radiation Oncology  [address] [place] [id number]  Office:  [phone]  Fax: [phone]    DATE:  11/10/2021  PATIENT: Hoang Renteria   1940                                 MEDICAL RECORD #: [id number]    Reason for Follow up Visit:  Hoang Renteria is a very pleasant 81 y.o. patient that completed radiation to the lung and returns to the clinic today for routine follow up exam. Reports fatigue, choking (with certain foods), and SOB. Denies appetite change, unexpected weight change, trouble swallowing, sore throat, visual disturbance, cough, diarrhea, nausea/vomiting, dizziness, light-headedness, and headaches.     History of Present Illness:  Diagnosed in March 2020 with Adenocarcinoma of the lung, SANDRO, 1.3 cm. Underwent SANDRO wedge resection on 03/31/2020, pathology revealed  margins negative.Negative for PNI.   • RECURRENCE: 03/16/2021 CT Chest revealed SANDRO nodularity increased, 1.8 cm (SUV 5.9). Completed 5000 cGy in 4 fractions to the left upper lobe of the lung on 05/07/2021.     06/22/2018 - CT Lung screening (Annual):  • Lung rads category 4A-there are several foci of nodularity within the lung. The largest of the nodules measure 6 mm in size in the the right lower lobe.   • There is also a focus of nodularity within the inferior lingular segment of the left upper lobe which radiographically has the appearance of atelectasis and/or scarring.   • The nodular component does measure approximately 1.5 cm in long axis and I feel would categorize this patient into a 4a category.   • Follow-up low dose CT in 3 months is recommended to assure stability of this particular finding.     09/24/2018 - CT Lung screening (Annual):  • Lung rads category   • Overall stable appearance of the nodules from the previous study of  6/22/2018 except for interval diminishment in size of a focus of atelectasis/scarring within the lingular segment of the left upper lobe.   • No new nodules are present.   • Follow-up low dose CT imaging in 12 months is recommended.     01/16/2020 - CT Chest with and without contrast:  • Left upper lobe with a 14 x 7 mm pulmonary nodule, previously 6 mm on 9/24/2018.   • Right lower lobe with 7 mm pleural-based pulmonary nodule, previously 7 mm on 9/24/2018.   Impression:  • Increased size of left upper lobe pulmonary nodule now measuring 14 x 7 mm, previously 6 mm on 9/24/2018. This is concerning for malignancy. Consider PET/CT or tissue sampling for further evaluation.   • No lymphadenopathy.   • Coronary artery, aortic and branch vessel atherosclerosis.     01/24/2020 - PET Scan:  • Left upper lobe pulmonary nodule, only mildly hypermetabolic with a maximum density of 1.87 SUV, equivocal for malignancy. Follow-up recommended.   • No scintigraphic evidence of hypermetabolic neoplastic disease.     02/04/2020 - Pulmonary Function tests:  • FVC - 87   • FEV1 - 76   • FEF 25-75% - 42   • FEV1/FVC - 67.35   • DLCO - 55   • D/VAsb - 52     02/04/2020 - Appointment with :  • The series of images is reviewed.   • I am very concerned about the progressive growth, despite the equivocal pet result, with risk for lung cancer.   • Refer to CT surgery for eval for wedge +/- lobectomy. FEV1 ok, although DLCO is reduced.I am giving him a sample of anoro and we showed him how to use it. I do not know why epic did not embed that action under plan above, but I can't get the program to do that.   • Recommend smoking abstinence.   • On anticoagulation which will need to be held prior to surgery.    03/31/2020 - Lung, left upper lobe wedge resection - per :  • Moderately differentiated lung adenocarcinoma, margins negative.   • Carcinoma measures 1.3 cm in greatest dimension.   • Negative for evidence of pleural  invasion.   • Surgical excision margins are negative for evidence of malignancy.   • Mild subpleural emphysematous changes involving nonneoplastic lung parenchyma.   AJCC STAGE: pT1a, pNx, pMx     05/06/2020 - Appointment with :  • We will plan follow-up in September with repeat chest CT as well as pulmonary function testing then.  We will try to see him for a physical real visit in the office.    • Unable to do physical exam over the phone other than just listening to his voice.    • His voice is strong and able to complete full sentences and is in good spirits and good humor.    08/31/2020 - CT Chest without contrast:  • Postoperative change of thoracotomy with LEFT upper lobe wedge resection.   • No evidence of residual or metastatic disease.   • There is nodular thickening along the wedge resection line which is likely postoperative scarring but recommend continued imaging surveillance.     09/08/2020 - Appointment with :  • Patient seems to be stable from an oncology standpoint.  Latest CT of the chest at the end of last month at [facility] is negative.  We will continue to follow him with intermittent imaging.  He did have a wedge resection.  COPD is at least moderate based on his latest spirometry.    • He has not responded well to other inhalers in the past.    • I do have some Bevespi samples and will give him a trial of that, 2 puffs twice a day.  Coronavirus avoidance.    • We will follow him up with spirometry in a few months.    • We will get follow-up imaging next year as well.    • We will order that when he comes back in.    03/09/2021 - Appointment with :  • Will plan follow up ct. Pt gets all his films at [facility], so we will order it there and will need to get a disc to review. I told him to make sure we discuss result over phone.   • Discussed potential for data transfer failure between computer systems at the 2 institutions.   • Follow up in 6 months with  spirometry. Continue mobilization as much as he can.   • Could benefit from pulm rehab once coronavirus pandemic improves.    03/16/2021 - CT Chest without contrast:  • There is suture material in the left upper lobe with adjacent nodularity. The adjacent soft tissue nodularity has increased insize since the prior exam, measuring 18 x 16 mm in size, previously measuring 10 x 6 mm in size. There is some adjacent parenchymal distortion, presumably related to scarring.   • There is a tiny 3 mm nodule in the medial left upper lobe, more conspicuous on the current exam (series 3 image 43).  • Several tiny nodules are seen along the left major fissure, presumably tiny intrafissural lymph nodes. There are dependent changes in the lung bases.   • There is mild diffuse bronchial wall thickening.   • There is some atelectasis versus scarring and bronchiectasis in the right middle lobe.   • There is a small pleural-based nodule in the lateral right lower lobe which measures approximately 8 mm in size, previously measuring 10 mm in size.   • Review of the visualized portion of the upper abdomen demonstrates multiple exophytic renal lesions, incompletely characterized on this exam.   • At least one appears hyperdense, suggesting hemorrhagic or proteinaceous cyst.   Impression:  • Soft tissue nodule adjacent to suture material in the left upper lobe is concerning for recurrent disease.   • Multiple renal lesions, incompletely characterized on this exam. Follow-up nonemergent renal ultrasound recommended.   • Atherosclerosis of the aorta and coronary arteries.    03/19/2021 - Appointment with :  • We discussed the findings and reviewed the film Primary and most substantial concern is possible recurrence of lung cancer in patient with prior smoking history.  • Other possibilities include hypertrophic scarring at surgical site, reaction to coronavirus vaccine (doubt, but brought up by pt), granuloma or other benign disease,  metastatic lesion from renal lesions (also doubtful).   • Will plan PET scan.   • If abnormal and suggestive of malignancy, and no other sites identified, then he is a candidate for stereotactic radiosurgery.   • If negative or equivocal, then ongoing follow up could be considered as an alternative.   • Tissue diagnosis would likely require robotic bronchoscopy.   • COPD is stable    03/31/2021 - PET Scan:  • An 18 mm nodule in the left upper lobe demonstrates an SUV of 5.9.  • Review of the mediastinum reveals no hypermetabolic lymphadenopathy.   Impression:  • Solitary left upper lobe pulmonary nodule with abnormal SUV 5.9 allograft   • There are no other regions of abnormal metabolic activity.     04/07/2021 - Appointment with :  • Following this discussion and in consideration of the diagnostic data/evaluation of the patient, I recommended a course of stereotactic radiosurgery to the SANDRO nodule, will simulate treatment fields today, 3D CT with MIPS to begin the planning process, final course pending.  Plan:  • Plan on 4-5 radiation treatments over about 2 weeks  • Very little in the way of side effects  • We will follow with CT scans of thorax every 3 months after treatment    04/27/2021 - 05/07/2021 - Completed Radiation course:  • Received 5000 cGy in 4 fractions to left upper lobe of lung via stereotactic radiosurgery.    05/19/2021 - Appointment with :  • Return in about 3 months (around 8/19/2021).    08/02/2021 - CT Chest:  • Decreased soft tissue component along the left upper lobe suture  line with probable postradiation changes.   • Stable right lower lobe 8 mm pulmonary nodule compared to 3/16/2021.   • Heavily calcified coronary arteries versus stent.     08/09/2021 - Appointment with :  • Your scan looks great, you are in remission!   • Return to Dr. Bruner in 3 months with a CT scan before.     10/27/2021 - CT chest without contrast:  • Increasing left upper and superior  segment left lower lobe opacities compared to 2021 which may represent sequelae of radiation. Superimposed pneumonia considered. Similar postoperative changes of the left upper lung. Stable 8 mm subpleural right lower lobe nodule. No new dominant nodules identified.   • Left coronary artery stents and/or calcifications.     History obtained from  PATIENT and CHART    PAST MEDICAL HISTORY  Past Medical History:   Diagnosis Date   • Hyperlipidemia    • Hypertension    • Shortness of breath 2020   • Stage 2 moderate COPD by GOLD classification (HCC) 2020      PAST SURGICAL HISTORY  Past Surgical History:   Procedure Laterality Date   • COLONOSCOPY  2016    three polyps  all removed   • COLONOSCOPY N/A 2019    Procedure: COLONOSCOPY WITH ANESTHESIA;  Surgeon: Reddy Ann DO;  Location: [facility] ENDOSCOPY;  Service: Gastroenterology   • HERNIA REPAIR        FAMILY HISTORY  family history includes Cancer in his mother.     SOCIAL HISTORY  Social History     Tobacco Use   • Smoking status: Former Smoker     Packs/day: 1.00     Years: 65.00     Pack years: 65.00     Types: Cigarettes     Quit date: 2/10/2020     Years since quittin.7   • Smokeless tobacco: Former User   Substance Use Topics   • Alcohol use: No   • Drug use: No      Sulfamethoxazole-trimethoprim      MEDICATIONS  Current Outpatient Medications   Medication Sig Dispense Refill   • allopurinol (ZYLOPRIM) 100 MG tablet Take 100 mg by mouth Daily.     • amLODIPine (NORVASC) 5 MG tablet Take 5 mg by mouth 2 times daily     • atenolol (TENORMIN) 25 MG tablet Take 25 mg by mouth daily.     • atorvastatin (LIPITOR) 10 MG tablet Take 10 mg by mouth 3 (Three) Times a Week. mon-wed-fri     • clobetasol (TEMOVATE) 0.05 % cream Apply  topically to the appropriate area as directed 2 (Two) Times a Day.     • Cobalamine Combinations (B-12) 100-5000 MCG sublingual tablet Place  under the tongue.     • fluticasone (FLONASE) 50 MCG/ACT nasal  "spray 2 sprays into the nostril(s) as directed by provider Daily.     • gabapentin (NEURONTIN) 300 MG capsule Take 300 mg by mouth 2 times daily.     • hydrALAZINE (APRESOLINE) 10 MG tablet Take 10 mg by mouth 2 times daily     • hydrOXYzine (ATARAX) 10 MG tablet Take 10 mg by mouth daily     • leflunomide (ARAVA) 20 MG tablet Take 20 mg by mouth Daily.     • loratadine (Claritin) 10 MG tablet Take 10 mg by mouth.     • losartan (COZAAR) 25 MG tablet Take 25 mg by mouth     • nitroglycerin (NITROSTAT) 0.4 MG SL tablet Place 0.4 mg under the tongue every 5 minutes as needed.     • Omega 3 1200 MG capsule Take 1 capsule by mouth.     • omeprazole (priLOSEC) 20 MG capsule Take 20 mg by mouth daily.     • phenylephrine-mineral oil-petrolatum (PREPARATION H) 0.25-14-74.9 % ointment hemorrhoidal ointment Insert  into the rectum 3 (Three) Times a Day As Needed.     • prasugrel (EFFIENT) 10 MG tablet Take 5 mg by mouth Daily.     • sodium bicarbonate 650 MG tablet Take 650 mg by mouth 3 times daily.     • tamsulosin (FLOMAX) 0.4 MG capsule 24 hr capsule Take 0.4 mg by mouth daily.     • vitamin D (ERGOCALCIFEROL) 1.25 MG (37784 UT) capsule capsule Take 50,000 Units by mouth Every 30 (Thirty) Days.       No current facility-administered medications for this visit.      Current outpatient and discharge medications have been reconciled for the patient.  Reviewed by: Luis Felipe Bruner III, MD    The following portions of the patient's history were reviewed and updated as appropriate: allergies, current medications, past family history, past medical history, past social history, past surgical history and problem list.    REVIEW OF SYSTEMS  Review of Systems   Constitutional: Positive for fatigue. Negative for appetite change and unexpected weight change.   HENT: Negative for sore throat and trouble swallowing.    Eyes: Negative for visual disturbance.   Respiratory: Positive for choking (\"When I eat candy, the juice goes down the " "wrong hole and its like Im being strangled\" ) and shortness of breath (\"When I walk\" ). Negative for cough.    Cardiovascular: Negative for chest pain.   Gastrointestinal: Negative for constipation, diarrhea, nausea and vomiting.   Endocrine: Negative.    Genitourinary: Negative.    Musculoskeletal: Negative.    Skin: Negative.    Allergic/Immunologic: Negative.    Neurological: Negative for dizziness, light-headedness and headaches.   Hematological: Negative.    Psychiatric/Behavioral: Negative.       PHYSICAL EXAM  VITAL SIGNS:   Vitals:    11/10/21 1255   BP: 109/59   Pulse: 71   SpO2: 90%  Comment: room air   Weight: 84.1 kg (185 lb 8 oz)   Height: 170.2 cm (67\")   PainSc: 0-No pain      Physical Exam    General Appearance:  awake, alert, oriented, in no acute distress.  Head: Normocephalic  Eyes: Conjunctiva pink, pupils equal and reactive.   Ears:  Normal externally.  Hearing- normal to conversation  Nose/Sinuses:  Mucosa normal. No drainage or sinus tenderness.  Mouth/Throat:  Mucosa moist, no lesions; pharynx without erythema, edema or exudate.   Neck: Supple, no mass, non-tender  Back:  Symmetric, no curvature, ROM normal, no CVA tenderness   Lungs:  Normal expansion.  Clear to auscultation.  No rales, rhonchi, or wheezing.  Heart:  Heart sounds are normal.  Regular rate and rhythm without murmur, gallop or rub.  Abdomen:  Soft, non-tender, normal bowel sounds; no bruits, organomegaly or masses.  Extremities: Warm to touch, pink, with no edema. Pulses 2+ bilaterally  Musculoskeletal: strength and sensation grossly normal  Neurologic:  Alert and oriented, gait normal, non-focal exam  Psych exam: normal situational behavior   Skin:  Warm and moist. No suspicious lesions or rashes of concern    Performance Status: ECOG (0) Fully active, able to carry on all predisease performance without restriction    Clinical Quality Measures  -Pain Documented  Hoang Renteria reports a pain score of 0. Given his pain " assessment as noted, treatment options were discussed and the following options were decided upon as a follow-up plan to address the patient's pain: No pain, no plan given.    Pain Medications             gabapentin (NEURONTIN) 300 MG capsule Take 300 mg by mouth 2 times daily.    leflunomide (ARAVA) 20 MG tablet Take 20 mg by mouth Daily.        -Advanced Care Planning Advance Care Planning   ACP discussion was held with the patient during this visit. Patient does not have an advance directive, information provided.    -Body Mass Index Screening and Follow-Up Plan  Patient's Body mass index is 29.05 kg/m². indicating that he is overweight (BMI 25-29.9). Obesity-related health conditions include the following: none.     -Tobacco Use: Screening and Cessation Intervention Social History    Tobacco Use      Smoking status: Former Smoker        Packs/day: 1.00        Years: 65.00        Pack years: 65        Types: Cigarettes        Quit date: 2/10/2020        Years since quittin.7      Smokeless tobacco: Former User    ASSESSMENT AND PLAN  1. Malignant neoplasm of upper lobe of left lung (HCC)    2. Recurrent carcinoma of left lung (HCC)    3. Status post lobectomy of lung    4. Stage 2 moderate COPD by GOLD classification (HCC)    5. History of radiation therapy    6. Former smoker      Orders Placed This Encounter   Procedures   • CT Chest With Contrast     Standing Status:   Future     Standing Expiration Date:   11/10/2022     Scheduling Instructions:      Please schedule at [facility] please     Order Specific Question:   Release to patient     Answer:   Immediate   • NM Pet Skull Base To Mid Thigh     Standing Status:   Future     Standing Expiration Date:   11/10/2022     Order Specific Question:   What radiopharmaceutical is preferred for this exam?     Answer:   FDG  (offered at all sites)     Order Specific Question:   Release to patient     Answer:   Immediate     RECOMMENDATIONS:  Hoang Renteria is  status post completion of radiation therapy to the lung and presents to our clinic today for surveillance exam and to review imaging.   Diagnosed in March 2020 with Adenocarcinoma of the lung, SANDRO, 1.3 cm. Underwent SANDRO wedge resection on 03/31/2020, pathology revealed  margins negative.Negative for PNI.   · RECURRENCE: 03/16/2021 CT Chest revealed SANDRO nodularity increased, 1.8 cm (SUV 5.9). Completed 5000 cGy in 4 fractions to the left upper lobe of the lung on 05/07/2021.      CT-scan of the chest on 10/26/2021 revealed increasing left upper and superior segment left lower lobe opacities compared to 8/2/2021 which may represent sequelae of radiation. Superimposed pneumonia considered. Similar postoperative changes of the left upper lung. Stable 8 mm subpleural right lower lobe nodule. No new dominant nodules identified. Left coronary artery stents and/or calcifications.     I have ordered a PET scan for further evaluation of the increased left upper lobe opacities noted on the CT scan. We will continue routine follow-up/surveillance as discussed in 3 months with follow up CT scan before visit and I have instructed him to continue to see the other health care providers as per their scheduling.    Patient Instructions   1) We will order a PET scan, they will call you to schedule.  2) Otherwise we will see you back in 3 months with a CT chest before.     Todays appointment time was spent in counseling, coordination of care and surveillance related to patients diagnosis as well as radiation therapy possible and probable after effects.   Luis Felipe Bruner III, MD  11/10/2021

## 2021-11-10 ENCOUNTER — OFFICE VISIT (OUTPATIENT)
Dept: RADIATION ONCOLOGY | Facility: HOSPITAL | Age: 81
End: 2021-11-10

## 2021-11-10 ENCOUNTER — HOSPITAL ENCOUNTER (OUTPATIENT)
Dept: RADIATION ONCOLOGY | Facility: HOSPITAL | Age: 81
Setting detail: RADIATION/ONCOLOGY SERIES
End: 2021-11-10

## 2021-11-10 VITALS
HEIGHT: 67 IN | SYSTOLIC BLOOD PRESSURE: 109 MMHG | BODY MASS INDEX: 29.11 KG/M2 | OXYGEN SATURATION: 90 % | HEART RATE: 71 BPM | DIASTOLIC BLOOD PRESSURE: 59 MMHG | WEIGHT: 185.5 LBS

## 2021-11-10 DIAGNOSIS — C34.12 MALIGNANT NEOPLASM OF UPPER LOBE OF LEFT LUNG (HCC): Primary | ICD-10-CM

## 2021-11-10 DIAGNOSIS — C34.92 RECURRENT CARCINOMA OF LEFT LUNG (HCC): ICD-10-CM

## 2021-11-10 DIAGNOSIS — Z92.3 HISTORY OF RADIATION THERAPY: ICD-10-CM

## 2021-11-10 DIAGNOSIS — Z87.891 FORMER SMOKER: ICD-10-CM

## 2021-11-10 DIAGNOSIS — Z90.2 STATUS POST LOBECTOMY OF LUNG: ICD-10-CM

## 2021-11-10 DIAGNOSIS — J44.9 STAGE 2 MODERATE COPD BY GOLD CLASSIFICATION (HCC): ICD-10-CM

## 2021-11-10 PROCEDURE — G0463 HOSPITAL OUTPT CLINIC VISIT: HCPCS | Performed by: RADIOLOGY

## 2021-11-10 NOTE — PATIENT INSTRUCTIONS
1) We will order a PET scan, they will call you to schedule.  2) Otherwise we will see you back in 3 months with a CT chest before.

## 2021-11-12 ENCOUNTER — HOSPITAL ENCOUNTER (OUTPATIENT)
Dept: CT IMAGING | Facility: HOSPITAL | Age: 81
Discharge: HOME | End: 2021-11-12

## 2021-11-12 DIAGNOSIS — Z92.3 HISTORY OF RADIATION THERAPY: ICD-10-CM

## 2021-11-12 DIAGNOSIS — J44.9 STAGE 2 MODERATE COPD BY GOLD CLASSIFICATION (HCC): ICD-10-CM

## 2021-11-12 DIAGNOSIS — Z87.891 FORMER SMOKER: ICD-10-CM

## 2021-11-12 DIAGNOSIS — Z90.2 STATUS POST LOBECTOMY OF LUNG: ICD-10-CM

## 2021-11-12 DIAGNOSIS — C34.12 MALIGNANT NEOPLASM OF UPPER LOBE OF LEFT LUNG (HCC): ICD-10-CM

## 2021-11-12 PROCEDURE — A9552 F18 FDG: HCPCS

## 2021-11-12 PROCEDURE — 0 FLUDEOXYGLUCOSE F18 SOLUTION

## 2021-11-12 PROCEDURE — 78815 PET IMAGE W/CT SKULL-THIGH: CPT

## 2021-11-12 RX ADMIN — FLUDEOXYGLUCOSE F18 1 DOSE: 300 INJECTION INTRAVENOUS at 12:41

## 2021-11-14 ENCOUNTER — DOCUMENTATION (OUTPATIENT)
Dept: RADIATION ONCOLOGY | Facility: HOSPITAL | Age: 81
End: 2021-11-14

## 2021-11-14 NOTE — PROGRESS NOTES
PET scan is inconclusive for recurrent tumor versus local inflammatory effect from radiation therapy to this region.    At this time we will repeat another CT scan in 3 months.

## 2021-11-22 ENCOUNTER — TELEPHONE (OUTPATIENT)
Dept: PULMONOLOGY | Facility: CLINIC | Age: 81
End: 2021-11-22

## 2021-11-22 NOTE — TELEPHONE ENCOUNTER
----- Message from Hoang Renteria sent at 11/22/2021  5:53 AM CST -----  Regarding: Pft and appointment on 12;6  I want to cancel the PFT scheduled for 12/6 at 11 am but want to keep the appointment to see Dr Borjas 12/6 at 11:30.

## 2021-11-22 NOTE — TELEPHONE ENCOUNTER
This if from a [employer] message sent by the patient.       Please cancel the PFT.  He is going to keep the appt with Dr. Borjas.      No need to call the patient.

## 2021-12-06 ENCOUNTER — OFFICE VISIT (OUTPATIENT)
Dept: PULMONOLOGY | Facility: CLINIC | Age: 81
End: 2021-12-06

## 2021-12-06 VITALS
WEIGHT: 186.6 LBS | SYSTOLIC BLOOD PRESSURE: 134 MMHG | HEART RATE: 86 BPM | BODY MASS INDEX: 29.29 KG/M2 | OXYGEN SATURATION: 92 % | DIASTOLIC BLOOD PRESSURE: 82 MMHG | HEIGHT: 67 IN

## 2021-12-06 DIAGNOSIS — Z85.118 PERSONAL HISTORY OF LUNG CANCER: ICD-10-CM

## 2021-12-06 DIAGNOSIS — Z87.891 PERSONAL HISTORY OF NICOTINE DEPENDENCE: ICD-10-CM

## 2021-12-06 DIAGNOSIS — J44.9 STAGE 2 MODERATE COPD BY GOLD CLASSIFICATION (HCC): Primary | ICD-10-CM

## 2021-12-06 PROCEDURE — 99213 OFFICE O/P EST LOW 20 MIN: CPT | Performed by: INTERNAL MEDICINE

## 2021-12-06 NOTE — PROGRESS NOTES
"Background:  Pt with lung cancer resected by wedge resection SANDRO 3/2020, postop resp failure with hypoxia, hx RA, CAD s/p stents 2012, 2014.   Htn, CKD stage 3.  CXR 8/2020 postop changes   Chief Complaint  Left upper lobe pulmonary nodule and Stage 2 moderate COPD by GOLD classification    Subjective    History of Present Illness        Hoang Renteria presents to [facility]ARY [facility]ICINE.  Pt reports moderate intermittent dyspnea on exertion in chest associated with wheeze and alleviated by rest. He  Has not had a decline in exertion capacity.  No fever or bronchitic symptoms.          Objective     Vital Signs:   /82   Pulse 86   Ht 170.2 cm (67\")   Wt 84.6 kg (186 lb 9.6 oz)   SpO2 92%   BMI 29.23 kg/m²   Physical Exam  Constitutional:       Appearance: Normal appearance. He is not ill-appearing or diaphoretic.   Eyes:      Extraocular Movements: Extraocular movements intact.   Pulmonary:      Effort: Pulmonary effort is normal. No respiratory distress.      Breath sounds: No wheezing, rhonchi or rales.   Skin:     Findings: No erythema or rash.   Neurological:      Mental Status: He is alert.        Result Review  Data Reviewed:{ Labs  Result Review  Imaging  Media :23}   NM PET/CT Skull Base to Mid Thigh (11/12/2021 14:03)  1. There is diffuse uptake in the left upper lobe and superior segment  left lower lobe that corresponds to infiltrates seen on recent CT. The  SUV max is 4.3. Residual tumor in this area cannot be ruled out. Most of  the uptake is likely infectious/inflammatory.  2. No evidence of any distant metastatic disease.  3. Other CT findings, as discussed above. CT imaging was performed for  localization and attenuation purposes.  This report was finalized on 11/12/2021 14:52 by Dr. Parrish Maldonado MD.    PFT Values        Some values may be hidden. Unless noted otherwise, only the newest values recorded on each date are displayed.         Old " Values PFT Results 2/4/20   No data to display.      Pre Drug PFT Results 2/4/20   FVC 87   FEV1 76   FEF 25-75% 42   FEV1/FVC 67.35      Post Drug PFT Results 2/4/20   No data to display.      Other Tests PFT Results 2/4/20   DLCO 55   D/VAsb 52                 NM PET/CT Skull Base to Mid Thigh (11/12/2021 14:03)  1. There is diffuse uptake in the left upper lobe and superior segment  left lower lobe that corresponds to infiltrates seen on recent CT. The  SUV max is 4.3. Residual tumor in this area cannot be ruled out. Most of  the uptake is likely infectious/inflammatory.  2. No evidence of any distant metastatic disease.  3. Other CT findings, as discussed above. CT imaging was performed for  localization and attenuation purposes.  This report was finalized on 11/12/2021 14:52 by Dr. Parrish Maldonado MD.     Assessment and Plan  {CC Problem List  Visit Diagnosis  ROS  Review (Popup)  Health Maintenance  Quality  BestPractice  Medications  SmartSets  SnapShot Encounters  Media :23}   Problem List Items Addressed This Visit        Pulmonary Problems    Stage 2 moderate COPD by GOLD classification (HCC) - Primary       Other    Personal history of nicotine dependence    Personal history of lung cancer    Overview     Wedge resection 3/2020           he is stable without inhalers, he monitors o2 sat at home with good readings.  I agree the xray findings likely pneumoniits, post surgery, post xrt.  Dissipate follow-up CT scan already ordered.  Call for acute problems in the interim.    Follow Up {Instructions Charge Capture  Follow-up Communications :23}   No follow-ups on file.  Patient was given instructions and counseling regarding his condition or for health maintenance advice. Please see specific information pulled into the AVS if appropriate.    Electronically signed by Camron Borjas MD, 12/6/2021, 11:40 CST

## 2022-02-07 ENCOUNTER — HOSPITAL ENCOUNTER (OUTPATIENT)
Dept: CT IMAGING | Facility: HOSPITAL | Age: 82
Discharge: HOME | End: 2022-02-07

## 2022-02-07 DIAGNOSIS — Z87.891 FORMER SMOKER: ICD-10-CM

## 2022-02-07 DIAGNOSIS — C34.92 RECURRENT CARCINOMA OF LEFT LUNG: ICD-10-CM

## 2022-02-07 DIAGNOSIS — J44.9 STAGE 2 MODERATE COPD BY GOLD CLASSIFICATION: ICD-10-CM

## 2022-02-07 DIAGNOSIS — Z90.2 STATUS POST LOBECTOMY OF LUNG: ICD-10-CM

## 2022-02-07 DIAGNOSIS — Z92.3 HISTORY OF RADIATION THERAPY: ICD-10-CM

## 2022-02-07 DIAGNOSIS — C34.12 MALIGNANT NEOPLASM OF UPPER LOBE OF LEFT LUNG: ICD-10-CM

## 2022-02-07 LAB — CREAT BLDA-MCNC: 1.5 MG/DL (ref 0.6–1.3)

## 2022-02-07 PROCEDURE — 25010000002 IOPAMIDOL 61 % SOLUTION

## 2022-02-07 PROCEDURE — 82565 ASSAY OF CREATININE: CPT

## 2022-02-07 PROCEDURE — 71260 CT THORAX DX C+: CPT

## 2022-02-07 RX ADMIN — IOPAMIDOL 100 ML: 612 INJECTION, SOLUTION INTRAVENOUS at 14:07

## 2022-02-09 ENCOUNTER — HOSPITAL ENCOUNTER (OUTPATIENT)
Dept: RADIATION ONCOLOGY | Facility: HOSPITAL | Age: 82
Setting detail: RADIATION/ONCOLOGY SERIES
End: 2022-02-09

## 2022-02-09 NOTE — PROGRESS NOTES
[facility] MD      Charlie Francis, APRN  ____________________________________________________________  [facility]  Department of Radiation Oncology  [address] [place] [id number]  Office:  [phone]  Fax: [phone]    DATE: 02/10/2022  PATIENT: Hoang Renteria   1940                                 MEDICAL RECORD #: [id number]    Reason for Follow up Visit:  Hoang Renteria is a very pleasant 81 y.o. patient that completed radiation to the lung and returns to the clinic today for routine follow up exam. Reports fatigue, cough, and SOB. Denies appetite change, unexpected weight change, visual disturbance, chest pain, constipation, diarrhea, nausea/vomiting, diarrhea, light-headedness, and headaches.     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 Underwent SANDRO wedge resection on 03/31/2020, revealing negative margins.  • RECURRENCE: 03/16/2021 CT Chest revealed SANDRO nodularity increased, 1.8 cm (SUV 5.9). He completed 5000 cGy in 4 fractions to the SANDRO on 05/07/2021.    06/22/2018 - CT Lung screening (Annual):  • Lung rads category 4A-there are several foci of nodularity within the lung. The largest of the nodules measure 6 mm in size in the the right lower lobe.   • There is also a focus of nodularity within the inferior lingular segment of the left upper lobe which radiographically has the appearance of atelectasis and/or scarring.   • The nodular component does measure approximately 1.5 cm in long axis and I feel would categorize this patient into a 4a category.   • Follow-up low dose CT in 3 months is recommended to assure stability of this particular finding.     09/24/2018 - CT Lung screening (Annual):  • Lung rads category   • Overall stable appearance of the nodules from the previous study of 6/22/2018 except for interval diminishment in size of a focus of atelectasis/scarring within  the lingular segment of the left upper lobe.   • No new nodules are present.   • Follow-up low dose CT imaging in 12 months is recommended.     01/16/2020 - CT Chest with and without contrast:  • Left upper lobe with a 14 x 7 mm pulmonary nodule, previously 6 mm on 9/24/2018.   • Right lower lobe with 7 mm pleural-based pulmonary nodule, previously 7 mm on 9/24/2018.   Impression:  • Increased size of left upper lobe pulmonary nodule now measuring 14 x 7 mm, previously 6 mm on 9/24/2018. This is concerning for malignancy. Consider PET/CT or tissue sampling for further evaluation.   • No lymphadenopathy.   • Coronary artery, aortic and branch vessel atherosclerosis.     01/24/2020 - PET Scan:  • Left upper lobe pulmonary nodule, only mildly hypermetabolic with a maximum density of 1.87 SUV, equivocal for malignancy. Follow-up recommended.   • No scintigraphic evidence of hypermetabolic neoplastic disease.     02/04/2020 - Pulmonary Function tests:  • FVC - 87   • FEV1 - 76   • FEF 25-75% - 42   • FEV1/FVC - 67.35   • DLCO - 55   • D/VAsb - 52     02/04/2020 - Appointment with :  • The series of images is reviewed.   • I am very concerned about the progressive growth, despite the equivocal pet result, with risk for lung cancer.   • Refer to CT surgery for eval for wedge +/- lobectomy. FEV1 ok, although DLCO is reduced.I am giving him a sample of anoro and we showed him how to use it. I do not know why epic did not embed that action under plan above, but I can't get the program to do that.   • Recommend smoking abstinence.   • On anticoagulation which will need to be held prior to surgery.    03/31/2020 - Lung, left upper lobe wedge resection - per :  • Moderately differentiated lung adenocarcinoma, margins negative.   • Carcinoma measures 1.3 cm in greatest dimension.   • Negative for evidence of pleural invasion.   • Surgical excision margins are negative for evidence of malignancy.   • Mild  subpleural emphysematous changes involving nonneoplastic lung parenchyma.   AJCC STAGE: pT1a, pNx, pMx     05/06/2020 - Appointment with :  • We will plan follow-up in September with repeat chest CT as well as pulmonary function testing then.  We will try to see him for a physical real visit in the office.    • Unable to do physical exam over the phone other than just listening to his voice.    • His voice is strong and able to complete full sentences and is in good spirits and good humor.    08/31/2020 - CT Chest without contrast:  • Postoperative change of thoracotomy with LEFT upper lobe wedge resection.   • No evidence of residual or metastatic disease.   • There is nodular thickening along the wedge resection line which is likely postoperative scarring but recommend continued imaging surveillance.     09/08/2020 - Appointment with :  • Patient seems to be stable from an oncology standpoint.  Latest CT of the chest at the end of last month at [facility] is negative.  We will continue to follow him with intermittent imaging.  He did have a wedge resection.  COPD is at least moderate based on his latest spirometry.    • He has not responded well to other inhalers in the past.    • I do have some Bevespi samples and will give him a trial of that, 2 puffs twice a day.  Coronavirus avoidance.    • We will follow him up with spirometry in a few months.    • We will get follow-up imaging next year as well.    • We will order that when he comes back in.    03/09/2021 - Appointment with :  • Will plan follow up ct. Pt gets all his films at [facility], so we will order it there and will need to get a disc to review. I told him to make sure we discuss result over phone.   • Discussed potential for data transfer failure between computer systems at the 2 institutions.   • Follow up in 6 months with spirometry. Continue mobilization as much as he can.   • Could benefit from pulm rehab once  coronavirus pandemic improves.    03/16/2021 - CT Chest without contrast:  • There is suture material in the left upper lobe with adjacent nodularity. The adjacent soft tissue nodularity has increased insize since the prior exam, measuring 18 x 16 mm in size, previously measuring 10 x 6 mm in size. There is some adjacent parenchymal distortion, presumably related to scarring.   • There is a tiny 3 mm nodule in the medial left upper lobe, more conspicuous on the current exam (series 3 image 43).  • Several tiny nodules are seen along the left major fissure, presumably tiny intrafissural lymph nodes. There are dependent changes in the lung bases.   • There is mild diffuse bronchial wall thickening.   • There is some atelectasis versus scarring and bronchiectasis in the right middle lobe.   • There is a small pleural-based nodule in the lateral right lower lobe which measures approximately 8 mm in size, previously measuring 10 mm in size.   • Review of the visualized portion of the upper abdomen demonstrates multiple exophytic renal lesions, incompletely characterized on this exam.   • At least one appears hyperdense, suggesting hemorrhagic or proteinaceous cyst.   Impression:  • Soft tissue nodule adjacent to suture material in the left upper lobe is concerning for recurrent disease.   • Multiple renal lesions, incompletely characterized on this exam. Follow-up nonemergent renal ultrasound recommended.   • Atherosclerosis of the aorta and coronary arteries.    03/19/2021 - Appointment with :  • We discussed the findings and reviewed the film Primary and most substantial concern is possible recurrence of lung cancer in patient with prior smoking history.  • Other possibilities include hypertrophic scarring at surgical site, reaction to coronavirus vaccine (doubt, but brought up by pt), granuloma or other benign disease, metastatic lesion from renal lesions (also doubtful).   • Will plan PET scan.   • If  abnormal and suggestive of malignancy, and no other sites identified, then he is a candidate for stereotactic radiosurgery.   • If negative or equivocal, then ongoing follow up could be considered as an alternative.   • Tissue diagnosis would likely require robotic bronchoscopy.   • COPD is stable    03/31/2021 - PET Scan:  • An 18 mm nodule in the left upper lobe demonstrates an SUV of 5.9.  • Review of the mediastinum reveals no hypermetabolic lymphadenopathy.   Impression:  • Solitary left upper lobe pulmonary nodule with abnormal SUV 5.9 allograft   • There are no other regions of abnormal metabolic activity.     04/07/2021 - Appointment with :  • Following this discussion and in consideration of the diagnostic data/evaluation of the patient, I recommended a course of stereotactic radiosurgery to the SANDRO nodule, will simulate treatment fields today, 3D CT with MIPS to begin the planning process, final course pending.  Plan:  • Plan on 4-5 radiation treatments over about 2 weeks  • Very little in the way of side effects  • We will follow with CT scans of thorax every 3 months after treatment    04/27/2021 - 05/07/2021 - Completed Radiation course:  • Received 5000 cGy in 4 fractions to left upper lobe of lung via stereotactic radiosurgery.    05/19/2021 - Appointment with :  • Return in about 3 months (around 8/19/2021).    08/02/2021 - CT Chest:  • Decreased soft tissue component along the left upper lobe suture  line with probable postradiation changes.   • Stable right lower lobe 8 mm pulmonary nodule compared to 3/16/2021.   • Heavily calcified coronary arteries versus stent.     08/09/2021 - Appointment with :  • Your scan looks great, you are in remission!   • Return to Dr. Bruner in 3 months with a CT scan before.     10/27/2021 - CT chest without contrast:  • Increasing left upper and superior segment left lower lobe opacities compared to 8/2/2021 which may represent sequelae of  radiation. Superimposed pneumonia considered. Similar postoperative changes of the left upper lung. Stable 8 mm subpleural right lower lobe nodule. No new dominant nodules identified.   • Left coronary artery stents and/or calcifications.     11/10/2021 - Appointment with :  • CT-scan of the chest on 10/26/2021 revealed increasing left upper and superior segment left lower lobe opacities compared to 8/2/2021 which may represent sequelae of radiation. Superimposed pneumonia considered. Similar postoperative changes of the left upper lung. Stable 8 mm subpleural right lower lobe nodule. No new dominant nodules identified. Left coronary artery stents and/or calcifications.   • I have ordered a PET scan for further evaluation of the increased left upper lobe opacities noted on the CT scan. We will continue routine follow-up/surveillance as discussed in 3 months with follow up CT scan before visit and I have instructed him to continue to see the other health care providers as per their scheduling.  Plan:  • We will order a PET scan, they will call you to schedule.  • Otherwise we will see you back in 3 months with a CT chest before.     11/12/2021 - PET Scan:  • There is diffuse uptake in the left upper lobe and superior segment left lower lobe that corresponds to infiltrates seen on recent CT. The SUV max is 4.3. Residual tumor in this area cannot be ruled out. Most of the uptake is likely infectious/inflammatory.  • No evidence of any distant metastatic disease.    02/07/2022 - CT Chest with contrast:  • Scarlike infiltrate with focal calcification in the left upper lobe is slightly smaller now and postradiation changes favored.  • No new lung abnormality.    History obtained from  PATIENT, FAMILY and CHART    PAST MEDICAL HISTORY  Past Medical History:   Diagnosis Date   • Hyperlipidemia    • Hypertension    • Shortness of breath 2/4/2020   • Stage 2 moderate COPD by GOLD classification (HCC) 2/4/2020       PAST SURGICAL HISTORY  Past Surgical History:   Procedure Laterality Date   • COLONOSCOPY  2016    three polyps  all removed   • COLONOSCOPY N/A 2019    Procedure: COLONOSCOPY WITH ANESTHESIA;  Surgeon: Reddy Ann DO;  Location: [facility] ENDOSCOPY;  Service: Gastroenterology   • HERNIA REPAIR        FAMILY HISTORY  family history includes Cancer in his mother.    SOCIAL HISTORY  Social History     Tobacco Use   • Smoking status: Former Smoker     Packs/day: 1.00     Years: 65.00     Pack years: 65.00     Types: Cigarettes     Quit date: 2/10/2020     Years since quittin.0   • Smokeless tobacco: Former User   Substance Use Topics   • Alcohol use: No   • Drug use: No      Sulfamethoxazole-trimethoprim     MEDICATIONS  Current Outpatient Medications   Medication Sig Dispense Refill   • allopurinol (ZYLOPRIM) 100 MG tablet Take 100 mg by mouth Daily.     • amLODIPine (NORVASC) 5 MG tablet Take 5 mg by mouth 2 times daily     • atenolol (TENORMIN) 25 MG tablet Take 25 mg by mouth daily.     • atorvastatin (LIPITOR) 10 MG tablet Take 10 mg by mouth 3 (Three) Times a Week. mon-wed-fri     • clobetasol (TEMOVATE) 0.05 % cream Apply  topically to the appropriate area as directed 2 (Two) Times a Day.     • Cobalamine Combinations (B-12) 100-5000 MCG sublingual tablet Place  under the tongue.     • fluticasone (FLONASE) 50 MCG/ACT nasal spray 2 sprays into the nostril(s) as directed by provider Daily.     • gabapentin (NEURONTIN) 300 MG capsule Take 300 mg by mouth 2 times daily.     • hydrALAZINE (APRESOLINE) 10 MG tablet Take 10 mg by mouth 2 times daily     • hydrOXYzine (ATARAX) 10 MG tablet Take 10 mg by mouth daily     • leflunomide (ARAVA) 20 MG tablet Take 20 mg by mouth Daily.     • losartan (COZAAR) 25 MG tablet Take 25 mg by mouth     • nitroglycerin (NITROSTAT) 0.4 MG SL tablet Place 0.4 mg under the tongue every 5 minutes as needed.     • Omega 3 1200 MG capsule Take 1 capsule by mouth.     •  "omeprazole (priLOSEC) 20 MG capsule Take 20 mg by mouth daily.     • phenylephrine-mineral oil-petrolatum (PREPARATION H) 0.25-14-74.9 % ointment hemorrhoidal ointment Insert  into the rectum 3 (Three) Times a Day As Needed.     • prasugrel (EFFIENT) 10 MG tablet Take 5 mg by mouth Daily.     • sodium bicarbonate 650 MG tablet Take 650 mg by mouth 3 times daily.     • tamsulosin (FLOMAX) 0.4 MG capsule 24 hr capsule Take 0.4 mg by mouth daily.     • vitamin D (ERGOCALCIFEROL) 1.25 MG (03734 UT) capsule capsule Take 50,000 Units by mouth Every 30 (Thirty) Days.       No current facility-administered medications for this visit.      Current outpatient and discharge medications have been reconciled for the patient.  Reviewed by: Suad eGorge LPN    The following portions of the patient's history were reviewed and updated as appropriate: allergies, current medications, past family history, past medical history, past social history, past surgical history and problem list.    REVIEW OF SYSTEMS  Review of Systems   Constitutional: Positive for fatigue. Negative for appetite change and unexpected weight change.   HENT: Negative.    Eyes: Negative for visual disturbance.   Respiratory: Positive for cough (productive in the AM with clear sputum ) and shortness of breath (with exeration ).    Cardiovascular: Negative for chest pain.   Gastrointestinal: Negative for constipation, diarrhea, nausea and vomiting.   Endocrine: Negative.    Genitourinary: Negative.    Musculoskeletal: Negative.    Skin: Negative.    Allergic/Immunologic: Negative.    Neurological: Negative for dizziness, light-headedness and headaches.   Hematological: Negative.    Psychiatric/Behavioral: Negative.      PHYSICAL EXAM  VITAL SIGNS:   Vitals:    02/10/22 0855   BP: 107/64   Pulse: 73   SpO2: 90%  Comment: room air   Weight: 85.6 kg (188 lb 11.2 oz)   Height: 170.2 cm (67\")   PainSc: 0-No pain     General Appearance:  awake, alert, oriented, in no " acute distress.  Head: Normocephalic  Eyes: Conjunctiva pink, pupils equal and reactive.   Ears:  Normal externally.    Nose/Sinuses:  Mucosa normal.   Mouth/Throat:  Mucosa moist, no lesions; pharynx without erythema, edema or exudate.   Neck: Supple, no mass, non-tender  Back:  Symmetric, no curvature, ROM normal, no CVA tenderness   Lungs:  Normal expansion, clear to auscultation.  No rales, rhonchi, or wheezing.  Heart:  Heart sounds are normal.  Regular rate and rhythm without murmur, gallop or rub.  Abdomen:  Soft, non-tender, normal bowel sounds; no bruits, organomegaly or masses.  Extremities: Warm to touch, pink, with no edema. Pulses 2+ bilaterally  Musculoskeletal: strength and sensation grossly normal  Neurologic:  Alert and oriented, gait normal, non-focal exam  Psych exam: normal situational behavior   Skin:  No suspicious lesions or rashes of concern    Performance Status: ECOG (0) Fully active, able to carry on all predisease performance without restriction    Clinical Quality Measures  -Pain Documented  Hoang Renteria reports a pain score of 0.  Given his pain assessment as noted, treatment options were discussed and the following options were decided upon as a follow-up plan to address the patient's pain: No pain, no plan given.  Pain Medications             gabapentin (NEURONTIN) 300 MG capsule Take 300 mg by mouth 2 times daily.    leflunomide (ARAVA) 20 MG tablet Take 20 mg by mouth Daily.        -Advanced Care Planning Advance Care Planning   ACP discussion was held with the patient during this visit. Patient does not have an advance directive, information provided.    -Body Mass Index Screening and Follow-Up Plan  Patient's Body mass index is 29.55 kg/m². indicating that he is overweight (BMI 25-29.9). Patient's (Body mass index is 29.55 kg/m².) indicates that they are overweight with health conditions that include none .     -Tobacco Use: Screening and Cessation Intervention Social  History    Tobacco Use      Smoking status: Former Smoker        Packs/day: 1.00        Years: 65.00        Pack years: 65        Types: Cigarettes        Quit date: 2/10/2020        Years since quittin.0      Smokeless tobacco: Former User    ASSESSMENT AND PLAN  1. Recurrent carcinoma of left lung (HCC)    2. Status post lobectomy of lung    3. Stage 2 moderate COPD by GOLD classification (HCC)    4. History of radiation therapy    5. Former smoker      Orders Placed This Encounter   Procedures   • CT Chest With Contrast     Standing Status:   Future     Standing Expiration Date:   2/10/2023     Order Specific Question:   Release to patient     Answer:   Immediate     RECOMMENDATIONS:  Hoang Renteria is status post completion of radiation therapy to the lung and presents to our clinic today for surveillance exam and to review imaging.    Diagnosed in 2020 with Adenocarcinoma of lung, SANDRO, 1.3 cm. Underwent SANDRO wedge resection on 2020, revealing negative margins.  • RECURRENCE: 2021 CT Chest revealed SANDRO nodularity increased, 1.8 cm (SUV 5.9). He completed 5000 cGy in 4 fractions to the SANDRO on 2021.    CT-scan of the chest on 2022 revealed scarlike infiltrate with focal calcification in the left upper lobe is slightly smaller now and postradiation changes favored. No new lung abnormality.    On exam, I do not see evidence for recurrent or metastatic disease at this time. We will continue routine follow-up/surveillance as discussed in 3 months with follow up CT scan prior to next visit. I have instructed him to continue to see the other health care providers as per their scheduling.    Patient Instructions   1) Return to Dr. Bruner in 3 months with a CT chest before.     Todays appointment time was spent in counseling, coordination of care and surveillance related to patients diagnosis as well as radiation therapy possible and probable after effects.   Suad George  LPN  02/10/2022

## 2022-02-10 ENCOUNTER — OFFICE VISIT (OUTPATIENT)
Dept: RADIATION ONCOLOGY | Facility: HOSPITAL | Age: 82
End: 2022-02-10

## 2022-02-10 VITALS
WEIGHT: 188.7 LBS | DIASTOLIC BLOOD PRESSURE: 64 MMHG | SYSTOLIC BLOOD PRESSURE: 107 MMHG | HEIGHT: 67 IN | BODY MASS INDEX: 29.62 KG/M2 | OXYGEN SATURATION: 90 % | HEART RATE: 73 BPM

## 2022-02-10 DIAGNOSIS — Z87.891 FORMER SMOKER: ICD-10-CM

## 2022-02-10 DIAGNOSIS — Z90.2 STATUS POST LOBECTOMY OF LUNG: ICD-10-CM

## 2022-02-10 DIAGNOSIS — J44.9 STAGE 2 MODERATE COPD BY GOLD CLASSIFICATION: ICD-10-CM

## 2022-02-10 DIAGNOSIS — Z92.3 HISTORY OF RADIATION THERAPY: ICD-10-CM

## 2022-02-10 DIAGNOSIS — C34.92 RECURRENT CARCINOMA OF LEFT LUNG: Primary | ICD-10-CM

## 2022-02-10 PROCEDURE — G0463 HOSPITAL OUTPT CLINIC VISIT: HCPCS | Performed by: RADIOLOGY

## 2022-05-05 ENCOUNTER — HOSPITAL ENCOUNTER (OUTPATIENT)
Dept: CT IMAGING | Facility: HOSPITAL | Age: 82
Discharge: HOME | End: 2022-05-05

## 2022-05-05 DIAGNOSIS — Z90.2 STATUS POST LOBECTOMY OF LUNG: ICD-10-CM

## 2022-05-05 DIAGNOSIS — Z87.891 FORMER SMOKER: ICD-10-CM

## 2022-05-05 DIAGNOSIS — Z92.3 HISTORY OF RADIATION THERAPY: ICD-10-CM

## 2022-05-05 DIAGNOSIS — J44.9 STAGE 2 MODERATE COPD BY GOLD CLASSIFICATION: ICD-10-CM

## 2022-05-05 DIAGNOSIS — C34.92 RECURRENT CARCINOMA OF LEFT LUNG: ICD-10-CM

## 2022-05-05 LAB — CREAT BLDA-MCNC: 1.6 MG/DL (ref 0.6–1.3)

## 2022-05-05 PROCEDURE — 71260 CT THORAX DX C+: CPT

## 2022-05-05 PROCEDURE — 82565 ASSAY OF CREATININE: CPT

## 2022-05-05 PROCEDURE — 25010000002 IOPAMIDOL 61 % SOLUTION

## 2022-05-05 RX ADMIN — IOPAMIDOL 100 ML: 612 INJECTION, SOLUTION INTRAVENOUS at 13:19

## 2022-05-10 NOTE — PROGRESS NOTES
[facility]   Luis Felipe Bruner MD      Charlie Francis, APRN  ____________________________________________________________  [facility]  Department of Radiation Oncology  [address] [place] [id number]  Office:  [phone]  Fax: [phone]    DATE: 05/11/2022  PATIENT: Hoang Renteria   1940                                 MEDICAL RECORD #: [id number]    Reason for Follow up Visit:  Hoang Renteria is a very pleasant 81 y.o. patient that completed radiation to the lung and returns to the clinic today for routine follow up exam. Reports fatigue, cough, and SOB. Denies appetite change, unexpected weight change, nausea/vomiting, diarrhea, light-headedness, weakness, and headaches.      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 Underwent SANDRO wedge resection on 03/31/2020.  · RECURRENCE: 03/16/2021 CT Chest revealed SANDRO nodularity increased, 1.8 cm. He completed 5000 cGy in 4 fractions to the SANDRO on 05/07/2021.    06/22/2018 - CT Lung screening (Annual):  • Lung rads category 4A-there are several foci of nodularity within the lung. The largest of the nodules measure 6 mm in size in the the right lower lobe.   • There is also a focus of nodularity within the inferior lingular segment of the left upper lobe which radiographically has the appearance of atelectasis and/or scarring.   • The nodular component does measure approximately 1.5 cm in long axis and I feel would categorize this patient into a 4a category.   • Follow-up low dose CT in 3 months is recommended to assure stability of this particular finding.     09/24/2018 - CT Lung screening (Annual):  • Lung rads category   • Overall stable appearance of the nodules from the previous study of 6/22/2018 except for interval diminishment in size of a focus of atelectasis/scarring within the lingular segment of the left upper lobe.   • No new nodules are present.    • Follow-up low dose CT imaging in 12 months is recommended.     01/16/2020 - CT Chest with and without contrast:  • Left upper lobe with a 14 x 7 mm pulmonary nodule, previously 6 mm on 9/24/2018.   • Right lower lobe with 7 mm pleural-based pulmonary nodule, previously 7 mm on 9/24/2018.   Impression:  • Increased size of left upper lobe pulmonary nodule now measuring 14 x 7 mm, previously 6 mm on 9/24/2018. This is concerning for malignancy. Consider PET/CT or tissue sampling for further evaluation.   • No lymphadenopathy.   • Coronary artery, aortic and branch vessel atherosclerosis.     01/24/2020 - PET Scan:  • Left upper lobe pulmonary nodule, only mildly hypermetabolic with a maximum density of 1.87 SUV, equivocal for malignancy. Follow-up recommended.   • No scintigraphic evidence of hypermetabolic neoplastic disease.     02/04/2020 - Pulmonary Function tests:  • FVC - 87   • FEV1 - 76   • FEF 25-75% - 42   • FEV1/FVC - 67.35   • DLCO - 55   • D/VAsb - 52     02/04/2020 - Appointment with :  • The series of images is reviewed.   • I am very concerned about the progressive growth, despite the equivocal pet result, with risk for lung cancer.   • Refer to CT surgery for eval for wedge +/- lobectomy. FEV1 ok, although DLCO is reduced.I am giving him a sample of anoro and we showed him how to use it. I do not know why epic did not embed that action under plan above, but I can't get the program to do that.   • Recommend smoking abstinence.   • On anticoagulation which will need to be held prior to surgery.    03/31/2020 - Lung, left upper lobe wedge resection - per :  • Moderately differentiated lung adenocarcinoma, margins negative.   • Carcinoma measures 1.3 cm in greatest dimension.   • Negative for evidence of pleural invasion.   • Surgical excision margins are negative for evidence of malignancy.   • Mild subpleural emphysematous changes involving nonneoplastic lung parenchyma.   AJCC  STAGE: pT1a, pNx, pMx     05/06/2020 - Appointment with :  • We will plan follow-up in September with repeat chest CT as well as pulmonary function testing then.  We will try to see him for a physical real visit in the office.    • Unable to do physical exam over the phone other than just listening to his voice.    • His voice is strong and able to complete full sentences and is in good spirits and good humor.    08/31/2020 - CT Chest without contrast:  • Postoperative change of thoracotomy with LEFT upper lobe wedge resection.   • No evidence of residual or metastatic disease.   • There is nodular thickening along the wedge resection line which is likely postoperative scarring but recommend continued imaging surveillance.     09/08/2020 - Appointment with :  • Patient seems to be stable from an oncology standpoint.  Latest CT of the chest at the end of last month at [facility] is negative.  We will continue to follow him with intermittent imaging.  He did have a wedge resection.  COPD is at least moderate based on his latest spirometry.    • He has not responded well to other inhalers in the past.    • I do have some Bevespi samples and will give him a trial of that, 2 puffs twice a day.  Coronavirus avoidance.    • We will follow him up with spirometry in a few months.    • We will get follow-up imaging next year as well.    • We will order that when he comes back in.    03/09/2021 - Appointment with :  • Will plan follow up ct. Pt gets all his films at [facility], so we will order it there and will need to get a disc to review. I told him to make sure we discuss result over phone.   • Discussed potential for data transfer failure between computer systems at the 2 institutions.   • Follow up in 6 months with spirometry. Continue mobilization as much as he can.   • Could benefit from pulm rehab once coronavirus pandemic improves.    03/16/2021 - CT Chest without contrast:  • There is  suture material in the left upper lobe with adjacent nodularity. The adjacent soft tissue nodularity has increased insize since the prior exam, measuring 18 x 16 mm in size, previously measuring 10 x 6 mm in size. There is some adjacent parenchymal distortion, presumably related to scarring.   • There is a tiny 3 mm nodule in the medial left upper lobe, more conspicuous on the current exam (series 3 image 43).  • Several tiny nodules are seen along the left major fissure, presumably tiny intrafissural lymph nodes. There are dependent changes in the lung bases.   • There is mild diffuse bronchial wall thickening.   • There is some atelectasis versus scarring and bronchiectasis in the right middle lobe.   • There is a small pleural-based nodule in the lateral right lower lobe which measures approximately 8 mm in size, previously measuring 10 mm in size.   • Review of the visualized portion of the upper abdomen demonstrates multiple exophytic renal lesions, incompletely characterized on this exam.   • At least one appears hyperdense, suggesting hemorrhagic or proteinaceous cyst.   Impression:  • Soft tissue nodule adjacent to suture material in the left upper lobe is concerning for recurrent disease.   • Multiple renal lesions, incompletely characterized on this exam. Follow-up nonemergent renal ultrasound recommended.   • Atherosclerosis of the aorta and coronary arteries.    03/19/2021 - Appointment with :  • We discussed the findings and reviewed the film Primary and most substantial concern is possible recurrence of lung cancer in patient with prior smoking history.  • Other possibilities include hypertrophic scarring at surgical site, reaction to coronavirus vaccine (doubt, but brought up by pt), granuloma or other benign disease, metastatic lesion from renal lesions (also doubtful).   • Will plan PET scan.   • If abnormal and suggestive of malignancy, and no other sites identified, then he is a candidate  for stereotactic radiosurgery.   • If negative or equivocal, then ongoing follow up could be considered as an alternative.   • Tissue diagnosis would likely require robotic bronchoscopy.   • COPD is stable    03/31/2021 - PET Scan:  • An 18 mm nodule in the left upper lobe demonstrates an SUV of 5.9.  • Review of the mediastinum reveals no hypermetabolic lymphadenopathy.   Impression:  • Solitary left upper lobe pulmonary nodule with abnormal SUV 5.9 allograft   • There are no other regions of abnormal metabolic activity.     04/07/2021 - Appointment with :  • Following this discussion and in consideration of the diagnostic data/evaluation of the patient, I recommended a course of stereotactic radiosurgery to the SANDRO nodule, will simulate treatment fields today, 3D CT with MIPS to begin the planning process, final course pending.    04/27/2021 - 05/07/2021 - Completed Radiation course:  • Received 5000 cGy in 4 fractions to left upper lobe of lung via stereotactic radiosurgery.    05/19/2021 - Appointment with :  • Return in about 3 months (around 8/19/2021).    08/02/2021 - CT Chest:  • Decreased soft tissue component along the left upper lobe suture  line with probable postradiation changes.   • Stable right lower lobe 8 mm pulmonary nodule compared to 3/16/2021.   • Heavily calcified coronary arteries versus stent.     08/09/2021 - Appointment with :  • Your scan looks great, you are in remission!   • Return to Dr. Bruner in 3 months with a CT scan before.     10/27/2021 - CT chest without contrast:  • Increasing left upper and superior segment left lower lobe opacities compared to 8/2/2021 which may represent sequelae of radiation. Superimposed pneumonia considered. Similar postoperative changes of the left upper lung. Stable 8 mm subpleural right lower lobe nodule. No new dominant nodules identified.   • Left coronary artery stents and/or calcifications.     11/10/2021 - Appointment  with :  • CT-scan of the chest on 10/26/2021 revealed increasing left upper and superior segment left lower lobe opacities compared to 8/2/2021 which may represent sequelae of radiation. Superimposed pneumonia considered. Similar postoperative changes of the left upper lung. Stable 8 mm subpleural right lower lobe nodule. No new dominant nodules identified. Left coronary artery stents and/or calcifications.   • I have ordered a PET scan for further evaluation of the increased left upper lobe opacities noted on the CT scan. We will continue routine follow-up/surveillance as discussed in 3 months with follow up CT scan before visit and I have instructed him to continue to see the other health care providers as per their scheduling.    11/12/2021 - PET Scan:  • There is diffuse uptake in the left upper lobe and superior segment left lower lobe that corresponds to infiltrates seen on recent CT. The SUV max is 4.3. Residual tumor in this area cannot be ruled out. Most of the uptake is likely infectious/inflammatory.  • No evidence of any distant metastatic disease.    02/07/2022 - CT Chest with contrast:  • Scarlike infiltrate with focal calcification in the left upper lobe is slightly smaller now and postradiation changes favored.  • No new lung abnormality.    02/10/2022 - Appointment with :  • Follow up in 3 months     03/11/2022 - CT Abdomen/Pelvis with contrast:  • 1.5 cm left lower renal pole enhancing mass is most concerning for renal cell carcinoma.   • Asymmetric soft tissue density in the posterior dome of the bladder wall. Although the bladder isn't very distended, underlying opacity is not excluded.     05/05/2022 - CT Chest with contrast:  • Continued decrease in area of masslike consolidation in the LEFT upper lobe along the wedge resection suture line now measuring 3.6 x 2.7 cm on axial image 46 (previously 4.2 by 3.2 cm).   • Surrounding groundglass is also decreased.   • No change in  subpleural 8 mm triangular nodule in the RIGHT lower lobe on image 90.   • No new or enlarging pulmonary nodule.  Impression:  • Decrease in masslike consolidation in the LEFT upper lobe along the wedge resection suture line. Favor posttreatment change but recommend continued imaging surveillance/follow-up.   • No evidence of metastatic disease in the chest.    History obtained from  PATIENT, FAMILY and CHART    PAST MEDICAL HISTORY  Past Medical History:   Diagnosis Date   • Hyperlipidemia    • Hypertension    • Shortness of breath 2020   • Stage 2 moderate COPD by GOLD classification (HCC) 2020      PAST SURGICAL HISTORY  Past Surgical History:   Procedure Laterality Date   • COLONOSCOPY  2016    three polyps  all removed   • COLONOSCOPY N/A 2019    Procedure: COLONOSCOPY WITH ANESTHESIA;  Surgeon: Reddy Ann DO;  Location: [facility] ENDOSCOPY;  Service: Gastroenterology   • HERNIA REPAIR        FAMILY HISTORY  family history includes Cancer in his mother.    SOCIAL HISTORY  Social History     Tobacco Use   • Smoking status: Former Smoker     Packs/day: 1.00     Years: 65.00     Pack years: 65.00     Types: Cigarettes     Quit date: 2/10/2020     Years since quittin.2   • Smokeless tobacco: Former User   Substance Use Topics   • Alcohol use: No   • Drug use: No      Sulfamethoxazole-trimethoprim     MEDICATIONS  Current Outpatient Medications   Medication Sig Dispense Refill   • allopurinol (ZYLOPRIM) 100 MG tablet Take 100 mg by mouth Daily.     • amLODIPine (NORVASC) 5 MG tablet Take 5 mg by mouth 2 times daily     • atenolol (TENORMIN) 25 MG tablet Take 25 mg by mouth daily.     • atorvastatin (LIPITOR) 10 MG tablet Take 10 mg by mouth 3 (Three) Times a Week. mon-wed-fri     • clobetasol (TEMOVATE) 0.05 % cream Apply  topically to the appropriate area as directed 2 (Two) Times a Day.     • Cobalamine Combinations (B-12) 100-5000 MCG sublingual tablet Place  under the tongue.     •  "fluticasone (FLONASE) 50 MCG/ACT nasal spray 2 sprays into the nostril(s) as directed by provider Daily.     • gabapentin (NEURONTIN) 300 MG capsule Take 300 mg by mouth 2 times daily.     • hydrALAZINE (APRESOLINE) 10 MG tablet Take 10 mg by mouth 2 times daily     • hydrOXYzine (ATARAX) 10 MG tablet Take 10 mg by mouth daily     • leflunomide (ARAVA) 20 MG tablet Take 20 mg by mouth Daily.     • losartan (COZAAR) 25 MG tablet Take 25 mg by mouth     • nitroglycerin (NITROSTAT) 0.4 MG SL tablet Place 0.4 mg under the tongue every 5 minutes as needed.     • Omega 3 1200 MG capsule Take 1 capsule by mouth.     • phenylephrine-mineral oil-petrolatum (PREPARATION H) 0.25-14-74.9 % ointment hemorrhoidal ointment Insert  into the rectum 3 (Three) Times a Day As Needed.     • prasugrel (EFFIENT) 10 MG tablet Take 5 mg by mouth Daily.     • sodium bicarbonate 650 MG tablet Take 650 mg by mouth 3 times daily.     • tamsulosin (FLOMAX) 0.4 MG capsule 24 hr capsule Take 0.4 mg by mouth daily.     • vitamin D (ERGOCALCIFEROL) 1.25 MG (70446 UT) capsule capsule Take 50,000 Units by mouth Every 30 (Thirty) Days.       No current facility-administered medications for this visit.      Current outpatient and discharge medications have been reconciled for the patient.  Reviewed by: Luis Felipe Bruner III, MD    The following portions of the patient's history were reviewed and updated as appropriate: allergies, current medications, past family history, past medical history, past social history, past surgical history and problem list.    REVIEW OF SYSTEMS  Review of Systems   Constitutional: Positive for fatigue (\"fair\"). Negative for appetite change and unexpected weight change.   HENT: Negative.    Eyes: Negative.    Respiratory: Positive for cough (\"allergy drainage\" ) and shortness of breath (stable per patinet).    Cardiovascular: Negative.    Gastrointestinal: Negative.    Genitourinary: Negative.         Taking flomax " "  Musculoskeletal: Negative.    Skin: Negative.    Allergic/Immunologic: Negative.    Neurological: Negative.    Hematological: Negative.    Psychiatric/Behavioral: Negative.       PHYSICAL EXAM  VITAL SIGNS:   Vitals:    05/11/22 0924   BP: 106/60   Pulse: 71   SpO2: 92%  Comment: room air   Weight: 81.6 kg (180 lb)   Height: 170.2 cm (67\")   PainSc: 0-No pain      General Appearance:  awake, alert, oriented, in no acute distress.  Head: Normocephalic  Eyes: Conjunctiva pink, pupils equal and reactive.   Ears:  Normal externally.    Nose/Sinuses:  Mucosa normal.   Mouth/Throat:  Mucosa moist, no lesions; pharynx without erythema, edema or exudate.   Neck: Supple, no mass, non-tender  Back:  Symmetric, no curvature, ROM normal, no CVA tenderness   Lungs:  Normal expansion, clear to auscultation.  No rales, rhonchi, or wheezing.  Heart: Heart sounds are normal. Regular rate and rhythm without murmur, gallop or rub.  Abdomen:  Soft, non-tender, normal bowel sounds; no bruits, organomegaly or masses.  Extremities: Warm to touch, pink, with no edema. Pulses 2+ bilaterally  Musculoskeletal: strength and sensation grossly normal  Neurologic:  Alert and oriented, gait normal, non-focal exam  Psych exam: normal situational behavior   Skin:  No suspicious lesions or rashes of concern    Performance Status: ECOG (0) Fully active, able to carry on all predisease performance without restriction    Clinical Quality Measures  -Pain Documented  Hoang Renteria reports a pain score of 0. Given his pain assessment as noted, treatment options were discussed and the following options were decided upon as a follow-up plan to address the patient's pain: No pain, no plan given.  Pain Medications             gabapentin (NEURONTIN) 300 MG capsule Take 300 mg by mouth 2 times daily.    leflunomide (ARAVA) 20 MG tablet Take 20 mg by mouth Daily.        -Advanced Care Planning Advance Care Planning   ACP discussion was held with the patient " during this visit. Patient does not have an advance directive, information provided.    -Body Mass Index Screening and Follow-Up Plan  Patient's Body mass index is 28.19 kg/m². indicating that he is overweight (BMI 25-29.9). Patient's (Body mass index is 28.19 kg/m².) indicates that they are overweight with health conditions that include none .     -Tobacco Use: Screening and Cessation Intervention Social History    Tobacco Use      Smoking status: Former Smoker        Packs/day: 1.00        Years: 65.00        Pack years: 65        Types: Cigarettes        Quit date: 2/10/2020        Years since quittin.2      Smokeless tobacco: Former User    ASSESSMENT AND PLAN  1. Recurrent carcinoma of left lung (HCC)    2. Status post lobectomy of lung    3. Stage 2 moderate COPD by GOLD classification (HCC)    4. History of radiation therapy    5. Former smoker      Orders Placed This Encounter   Procedures   • CT Chest With Contrast     Standing Status:   Future     Standing Expiration Date:   2023     Order Specific Question:   Release to patient     Answer:   Immediate     RECOMMENDATIONS:  Hoang Renteria is status post completion of radiation therapy to the lung and presents to our clinic today for surveillance exam and to review imaging.    Diagnosed in 2020 with Adenocarcinoma of lung, SANDRO 1.3 cm. Underwent SANDRO wedge resection on 2020.  · RECURRENCE: 2021 CT Chest revealed SANDRO nodularity increased, 1.8 cm. He completed 5000 cGy in 4 fractions to the SANDRO on 2021.    CT-scan of the chest on 2022 revealed Decrease in masslike consolidation in the LEFT upper lobe along the wedge resection suture line. Favor posttreatment change but recommend continued imaging surveillance/follow-up. No evidence of metastatic disease in the chest.    On exam, I do not see evidence for recurrent or metastatic disease at this time. We will continue routine follow-up/surveillance as discussed in 4 months  with follow up CT scan prior to next visit. I have instructed him to continue to see the other health care providers as per their scheduling.    Patient Instructions   1) Return to Dr. Bruner in 4 months with a CT chest before     Todays appointment time was spent in counseling, coordination of care and surveillance related to patients diagnosis as well as radiation therapy possible and probable after effects.   Luis Felipe Bruner III, MD  05/11/2022

## 2022-05-11 ENCOUNTER — HOSPITAL ENCOUNTER (OUTPATIENT)
Dept: RADIATION ONCOLOGY | Facility: HOSPITAL | Age: 82
Setting detail: RADIATION/ONCOLOGY SERIES
End: 2022-05-11

## 2022-05-11 ENCOUNTER — OFFICE VISIT (OUTPATIENT)
Dept: RADIATION ONCOLOGY | Facility: HOSPITAL | Age: 82
End: 2022-05-11

## 2022-05-11 VITALS
BODY MASS INDEX: 28.25 KG/M2 | HEART RATE: 71 BPM | WEIGHT: 180 LBS | OXYGEN SATURATION: 92 % | DIASTOLIC BLOOD PRESSURE: 60 MMHG | SYSTOLIC BLOOD PRESSURE: 106 MMHG | HEIGHT: 67 IN

## 2022-05-11 DIAGNOSIS — Z90.2 STATUS POST LOBECTOMY OF LUNG: ICD-10-CM

## 2022-05-11 DIAGNOSIS — Z87.891 FORMER SMOKER: ICD-10-CM

## 2022-05-11 DIAGNOSIS — C34.92 RECURRENT CARCINOMA OF LEFT LUNG: Primary | ICD-10-CM

## 2022-05-11 DIAGNOSIS — Z92.3 HISTORY OF RADIATION THERAPY: ICD-10-CM

## 2022-05-11 DIAGNOSIS — J44.9 STAGE 2 MODERATE COPD BY GOLD CLASSIFICATION: ICD-10-CM

## 2022-05-11 PROCEDURE — G0463 HOSPITAL OUTPT CLINIC VISIT: HCPCS | Performed by: RADIOLOGY

## 2022-06-12 NOTE — PROGRESS NOTES
"Background:  Pt with lung cancer resected by wedge resection SANDRO 3/2020, postop resp failure with hypoxia, hx RA, CAD s/p stents 2012, 2014.   Htn, CKD stage 3.  CXR 8/2020 postop changes   Chief Complaint  COPD    Subjective    History of Present Illness       Hoang Renteria presents to [facility].  Pt follows up after resection of a malignant SANDRO nodule in 2020 followed by recurrence in left lung in 2021, s/p radiation tx.  He is not needing inhalers     Tobacco Use: Medium Risk   • Smoking Tobacco Use: Former Smoker   • Smokeless Tobacco Use: Former User      Objective     Vital Signs:   /70   Pulse 80   Ht 170.2 cm (67\")   Wt 82.6 kg (182 lb)   SpO2 93% Comment: RA  BMI 28.51 kg/m²   Physical Exam  Constitutional:       Appearance: Normal appearance. He is not ill-appearing or diaphoretic.   Eyes:      Extraocular Movements: Extraocular movements intact.   Pulmonary:      Effort: Pulmonary effort is normal. No respiratory distress.      Breath sounds: No wheezing, rhonchi or rales.   Skin:     Findings: No erythema or rash.   Neurological:      Mental Status: He is alert.        Result Review  Data Reviewed:{ Labs  Result Review  Imaging  Media :23}     CT Chest With Contrast Diagnostic (05/05/2022 13:18)  Decrease in masslike consolidation in the LEFT upper lobe along the  wedge resection suture line. Favor posttreatment change but recommend  continued imaging surveillance/follow-up. No evidence of metastatic  disease in the chest.               Assessment and Plan  {CC Problem List  Visit Diagnosis  ROS  Review (Popup)  Health Maintenance  Quality  BestPractice  Medications  SmartSets  SnapShot Encounters  Media :23}   Diagnoses and all orders for this visit:    1. Stage 2 moderate COPD by GOLD classification (HCC) (Primary)    2. Personal history of lung cancer    3. Personal history of nicotine dependence    will continue the " same for now  Follow up PFT next year.  Recommend booster of pneumovax 23. He has had prevnar 13.    Follow Up {Instructions Charge Capture  Follow-up Communications :23}   Return in about 1 year (around 6/13/2023) for spirometry.  Patient was given instructions and counseling regarding his condition or for health maintenance advice. Please see specific information pulled into the AVS if appropriate.    Electronically signed by Camron Borjas MD, 6/13/2022, 09:25 CDT

## 2022-06-13 ENCOUNTER — OFFICE VISIT (OUTPATIENT)
Dept: PULMONOLOGY | Facility: CLINIC | Age: 82
End: 2022-06-13

## 2022-06-13 VITALS
HEART RATE: 80 BPM | HEIGHT: 67 IN | SYSTOLIC BLOOD PRESSURE: 116 MMHG | DIASTOLIC BLOOD PRESSURE: 70 MMHG | OXYGEN SATURATION: 93 % | BODY MASS INDEX: 28.56 KG/M2 | WEIGHT: 182 LBS

## 2022-06-13 DIAGNOSIS — Z87.891 PERSONAL HISTORY OF NICOTINE DEPENDENCE: ICD-10-CM

## 2022-06-13 DIAGNOSIS — Z85.118 PERSONAL HISTORY OF LUNG CANCER: ICD-10-CM

## 2022-06-13 DIAGNOSIS — J44.9 STAGE 2 MODERATE COPD BY GOLD CLASSIFICATION: Primary | ICD-10-CM

## 2022-06-13 PROCEDURE — 99213 OFFICE O/P EST LOW 20 MIN: CPT | Performed by: INTERNAL MEDICINE

## 2022-09-29 ENCOUNTER — HOSPITAL ENCOUNTER (OUTPATIENT)
Dept: CT IMAGING | Facility: HOSPITAL | Age: 82
Discharge: HOME | End: 2022-09-29

## 2022-09-29 DIAGNOSIS — J44.9 STAGE 2 MODERATE COPD BY GOLD CLASSIFICATION: ICD-10-CM

## 2022-09-29 DIAGNOSIS — Z92.3 HISTORY OF RADIATION THERAPY: ICD-10-CM

## 2022-09-29 DIAGNOSIS — C34.92 RECURRENT CARCINOMA OF LEFT LUNG: ICD-10-CM

## 2022-09-29 DIAGNOSIS — Z90.2 STATUS POST LOBECTOMY OF LUNG: ICD-10-CM

## 2022-09-29 DIAGNOSIS — Z87.891 FORMER SMOKER: ICD-10-CM

## 2022-09-29 LAB — CREAT BLDA-MCNC: 1.5 MG/DL (ref 0.6–1.3)

## 2022-09-29 PROCEDURE — 71260 CT THORAX DX C+: CPT

## 2022-09-29 PROCEDURE — 82565 ASSAY OF CREATININE: CPT

## 2022-09-29 PROCEDURE — 25010000002 IOPAMIDOL 61 % SOLUTION

## 2022-09-29 RX ADMIN — IOPAMIDOL 100 ML: 612 INJECTION, SOLUTION INTRAVENOUS at 11:39

## 2022-10-06 ENCOUNTER — OFFICE VISIT (OUTPATIENT)
Dept: RADIATION ONCOLOGY | Facility: HOSPITAL | Age: 82
End: 2022-10-06

## 2022-10-06 ENCOUNTER — HOSPITAL ENCOUNTER (OUTPATIENT)
Dept: RADIATION ONCOLOGY | Facility: HOSPITAL | Age: 82
Setting detail: RADIATION/ONCOLOGY SERIES
End: 2022-10-06

## 2022-10-06 VITALS
HEIGHT: 67 IN | BODY MASS INDEX: 26.84 KG/M2 | WEIGHT: 171 LBS | SYSTOLIC BLOOD PRESSURE: 95 MMHG | HEART RATE: 66 BPM | OXYGEN SATURATION: 90 % | DIASTOLIC BLOOD PRESSURE: 51 MMHG

## 2022-10-06 DIAGNOSIS — Z87.891 FORMER SMOKER: ICD-10-CM

## 2022-10-06 DIAGNOSIS — Z90.2 STATUS POST LOBECTOMY OF LUNG: ICD-10-CM

## 2022-10-06 DIAGNOSIS — J44.9 STAGE 2 MODERATE COPD BY GOLD CLASSIFICATION: ICD-10-CM

## 2022-10-06 DIAGNOSIS — C34.92 RECURRENT CARCINOMA OF LEFT LUNG: Primary | ICD-10-CM

## 2022-10-06 DIAGNOSIS — Z92.3 HISTORY OF RADIATION THERAPY: ICD-10-CM

## 2022-10-06 PROCEDURE — G0463 HOSPITAL OUTPT CLINIC VISIT: HCPCS | Performed by: RADIOLOGY

## 2022-10-06 RX ORDER — DIPHENOXYLATE HYDROCHLORIDE AND ATROPINE SULFATE 2.5; .025 MG/1; MG/1
1 TABLET ORAL 4 TIMES DAILY PRN
COMMUNITY
Start: 2022-09-19

## 2022-10-06 NOTE — PROGRESS NOTES
[facility]   Luis Felipe Bruner MD      Charlie Francis, APRN  ____________________________________________________________  [facility]  Department of Radiation Oncology  [address] [place] [id number]  Office:  [phone]  Fax: [phone]    DATE: 10/06/2022   PATIENT: Hoang Renteria   1940                                 MEDICAL RECORD #: [id number]    Reason for Follow up Visit:  Hoang Renteria is a very pleasant 82 y.o. patient that completed radiation to the lung and returns to the clinic today for routine follow up exam. Repots SOB. Denies activity change, fatigue, unexpected weight change, chest pain, palpitations, weakness, and headaches. He continues to follow .     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 Underwent SANDRO wedge resection on 03/31/2020.  · RECURRENCE: 03/16/2021 CT Chest revealed SANDRO nodularity increased, 1.8 cm. He completed 5000 cGy in 4 fractions to the SANDRO on 05/07/2021.    06/22/2018 - CT Lung screening (Annual):  • Lung rads category 4A-there are several foci of nodularity within the lung. The largest of the nodules measure 6 mm in size in the the right lower lobe.   • There is also a focus of nodularity within the inferior lingular segment of the left upper lobe which radiographically has the appearance of atelectasis and/or scarring.   • The nodular component does measure approximately 1.5 cm in long axis and I feel would categorize this patient into a 4a category.   • Follow-up low dose CT in 3 months is recommended to assure stability of this particular finding.     09/24/2018 - CT Lung screening (Annual):  • Lung rads category   • Overall stable appearance of the nodules from the previous study of 6/22/2018 except for interval diminishment in size of a focus of atelectasis/scarring within the lingular segment of the left upper lobe.   • No new nodules are present.    • Follow-up low dose CT imaging in 12 months is recommended.     01/16/2020 - CT Chest with and without contrast:  • Left upper lobe with a 14 x 7 mm pulmonary nodule, previously 6 mm on 9/24/2018.   • Right lower lobe with 7 mm pleural-based pulmonary nodule, previously 7 mm on 9/24/2018.     01/24/2020 - PET Scan:  • Left upper lobe pulmonary nodule, only mildly hypermetabolic with a maximum density of 1.87 SUV, equivocal for malignancy. Follow-up recommended.   • No scintigraphic evidence of hypermetabolic neoplastic disease.     02/04/2020 - Pulmonary Function tests:  • FVC - 87   • FEV1 - 76   • FEF 25-75% - 42   • FEV1/FVC - 67.35   • DLCO - 55   • D/VAsb - 52     02/04/2020 - Appointment with :  • The series of images is reviewed.   • I am very concerned about the progressive growth, despite the equivocal pet result, with risk for lung cancer.   • Refer to CT surgery for eval for wedge +/- lobectomy. FEV1 ok, although DLCO is reduced.I am giving him a sample of anoro and we showed him how to use it. I do not know why epic did not embed that action under plan above, but I can't get the program to do that.   • Recommend smoking abstinence.   • On anticoagulation which will need to be held prior to surgery.    03/31/2020 - Lung, left upper lobe wedge resection - per :  • Moderately differentiated lung adenocarcinoma, margins negative.   • Carcinoma measures 1.3 cm in greatest dimension.   • Negative for evidence of pleural invasion.   • Surgical excision margins are negative for evidence of malignancy.   • Mild subpleural emphysematous changes involving nonneoplastic lung parenchyma.   AJCC STAGE: pT1a, pNx, pMx     05/06/2020 - Appointment with :  • We will plan follow-up in September with repeat chest CT as well as pulmonary function testing then.  We will try to see him for a physical real visit in the office.    • Unable to do physical exam over the phone other than just  listening to his voice.    • His voice is strong and able to complete full sentences and is in good spirits and good humor.    08/31/2020 - CT Chest without contrast:  • Postoperative change of thoracotomy with LEFT upper lobe wedge resection.   • No evidence of residual or metastatic disease.   • There is nodular thickening along the wedge resection line which is likely postoperative scarring but recommend continued imaging surveillance.     09/08/2020 - Appointment with :  • Patient seems to be stable from an oncology standpoint.  Latest CT of the chest at the end of last month at [facility] is negative.  We will continue to follow him with intermittent imaging.  He did have a wedge resection.  COPD is at least moderate based on his latest spirometry.    • He has not responded well to other inhalers in the past.    • I do have some Bevespi samples and will give him a trial of that, 2 puffs twice a day.  Coronavirus avoidance.    • We will follow him up with spirometry in a few months.    • We will get follow-up imaging next year as well.    • We will order that when he comes back in.    03/09/2021 - Appointment with :  • Will plan follow up ct. Pt gets all his films at [facility], so we will order it there and will need to get a disc to review. I told him to make sure we discuss result over phone.   • Discussed potential for data transfer failure between computer systems at the 2 institutions.   • Follow up in 6 months with spirometry. Continue mobilization as much as he can.   • Could benefit from pulm rehab once coronavirus pandemic improves.    03/16/2021 - CT Chest without contrast:  • There is suture material in the left upper lobe with adjacent nodularity. The adjacent soft tissue nodularity has increased insize since the prior exam, measuring 18 x 16 mm in size, previously measuring 10 x 6 mm in size. There is some adjacent parenchymal distortion, presumably related to scarring.    • There is a tiny 3 mm nodule in the medial left upper lobe, more conspicuous on the current exam (series 3 image 43).  • Several tiny nodules are seen along the left major fissure, presumably tiny intrafissural lymph nodes. There are dependent changes in the lung bases.   • There is mild diffuse bronchial wall thickening.   • There is some atelectasis versus scarring and bronchiectasis in the right middle lobe.   • There is a small pleural-based nodule in the lateral right lower lobe which measures approximately 8 mm in size, previously measuring 10 mm in size.   • Review of the visualized portion of the upper abdomen demonstrates multiple exophytic renal lesions, incompletely characterized on this exam.   • At least one appears hyperdense, suggesting hemorrhagic or proteinaceous cyst.   Impression:  • Soft tissue nodule adjacent to suture material in the left upper lobe is concerning for recurrent disease.   • Multiple renal lesions, incompletely characterized on this exam. Follow-up nonemergent renal ultrasound recommended.   • Atherosclerosis of the aorta and coronary arteries.    03/19/2021 - Appointment with :  • We discussed the findings and reviewed the film Primary and most substantial concern is possible recurrence of lung cancer in patient with prior smoking history.  • Other possibilities include hypertrophic scarring at surgical site, reaction to coronavirus vaccine (doubt, but brought up by pt), granuloma or other benign disease, metastatic lesion from renal lesions (also doubtful).   • Will plan PET scan.   • If abnormal and suggestive of malignancy, and no other sites identified, then he is a candidate for stereotactic radiosurgery.   • If negative or equivocal, then ongoing follow up could be considered as an alternative.   • Tissue diagnosis would likely require robotic bronchoscopy.   • COPD is stable    03/31/2021 - PET Scan:  • An 18 mm nodule in the left upper lobe demonstrates an SUV  of 5.9.  • Review of the mediastinum reveals no hypermetabolic lymphadenopathy.   Impression:  • Solitary left upper lobe pulmonary nodule with abnormal SUV 5.9 allograft   • There are no other regions of abnormal metabolic activity.     04/07/2021 - Appointment with :  • Following this discussion and in consideration of the diagnostic data/evaluation of the patient, I recommended a course of stereotactic radiosurgery to the SANDRO nodule, will simulate treatment fields today, 3D CT with MIPS to begin the planning process, final course pending.    04/27/2021 - 05/07/2021 - Completed Radiation course:  • Received 5000 cGy in 4 fractions to left upper lobe of lung via stereotactic radiosurgery.    05/19/2021 - Appointment with :  • Return in about 3 months (around 8/19/2021).    08/02/2021 - CT Chest:  • Decreased soft tissue component along the left upper lobe suture  line with probable postradiation changes.   • Stable right lower lobe 8 mm pulmonary nodule compared to 3/16/2021.   • Heavily calcified coronary arteries versus stent.     08/09/2021 - Appointment with :  • Your scan looks great, you are in remission!   • Return to Dr. Bruner in 3 months with a CT scan before.     10/27/2021 - CT chest without contrast:  • Increasing left upper and superior segment left lower lobe opacities compared to 8/2/2021 which may represent sequelae of radiation. Superimposed pneumonia considered. Similar postoperative changes of the left upper lung. Stable 8 mm subpleural right lower lobe nodule. No new dominant nodules identified.   • Left coronary artery stents and/or calcifications.     11/10/2021 - Appointment with :  • I have ordered a PET scan for further evaluation of the increased left upper lobe opacities noted on the CT scan. We will continue routine follow-up/surveillance as discussed in 3 months with follow up CT scan before visit and I have instructed him to continue to see the other  health care providers as per their scheduling.    11/12/2021 - PET Scan:  • There is diffuse uptake in the left upper lobe and superior segment left lower lobe that corresponds to infiltrates seen on recent CT. The SUV max is 4.3. Residual tumor in this area cannot be ruled out. Most of the uptake is likely infectious/inflammatory.  • No evidence of any distant metastatic disease.    02/07/2022 - CT Chest with contrast:  • Scarlike infiltrate with focal calcification in the left upper lobe is slightly smaller now and postradiation changes favored.  • No new lung abnormality.    02/10/2022 - Appointment with :  • Follow up in 3 months     03/11/2022 - CT Abdomen/Pelvis with contrast:  • 1.5 cm left lower renal pole enhancing mass is most concerning for renal cell carcinoma.   • Asymmetric soft tissue density in the posterior dome of the bladder wall. Although the bladder isn't very distended, underlying opacity is not excluded.     05/05/2022 - CT Chest with contrast:  • Continued decrease in area of masslike consolidation in the LEFT upper lobe along the wedge resection suture line now measuring 3.6 x 2.7 cm on axial image 46 (previously 4.2 by 3.2 cm).   • Surrounding groundglass is also decreased.   • No change in subpleural 8 mm triangular nodule in the RIGHT lower lobe on image 90.   • No new or enlarging pulmonary nodule.    05/11/2022 - Appointment with :  • Follow up in 4 months with Chest CT    06/13/2022 - Appointment with :  • Follow up in one year     09/29/2022 - CT chest with contrast:  • Stable appearance of the chest from previous study 5/5/2022. No radiographic evidence of localized recurrence or metastatic disease.  • Scarring within the periphery of the left upper lobe with involvement of the adjacent superior segment of the left lower lobe with associated pleural thickening and calcification. This is felt to represent post therapeutic change adjacent to a staple line and  is stable from the previous exam. No new or developing pulmonary nodules are present. No developing mediastinal, hilar or axillary adenopathy.     History obtained from  PATIENT, FAMILY and CHART    PAST MEDICAL HISTORY  Past Medical History:   Diagnosis Date   • Hyperlipidemia    • Hypertension    • Shortness of breath 2020   • Stage 2 moderate COPD by GOLD classification (HCC) 2020      PAST SURGICAL HISTORY  Past Surgical History:   Procedure Laterality Date   • COLONOSCOPY  2016    three polyps  all removed   • COLONOSCOPY N/A 2019    Procedure: COLONOSCOPY WITH ANESTHESIA;  Surgeon: Reddy Ann DO;  Location: [facility] ENDOSCOPY;  Service: Gastroenterology   • HERNIA REPAIR        FAMILY HISTORY  family history includes Cancer in his mother.    SOCIAL HISTORY  Social History     Tobacco Use   • Smoking status: Former     Packs/day: 1.00     Years: 65.00     Pack years: 65.00     Types: Cigarettes     Quit date: 2/10/2020     Years since quittin.6   • Smokeless tobacco: Former   Substance Use Topics   • Alcohol use: No   • Drug use: No      Sulfamethoxazole-trimethoprim     MEDICATIONS  Current Outpatient Medications   Medication Sig Dispense Refill   • allopurinol (ZYLOPRIM) 100 MG tablet Take 100 mg by mouth Daily.     • amLODIPine (NORVASC) 5 MG tablet Take 5 mg by mouth 2 times daily     • atenolol (TENORMIN) 25 MG tablet Take 25 mg by mouth daily.     • atorvastatin (LIPITOR) 10 MG tablet Take 10 mg by mouth 3 (Three) Times a Week. mon-wed-fri     • clobetasol (TEMOVATE) 0.05 % cream Apply  topically to the appropriate area as directed 2 (Two) Times a Day.     • Cobalamine Combinations (B-12) 100-5000 MCG sublingual tablet Place  under the tongue.     • diphenoxylate-atropine (LOMOTIL) 2.5-0.025 MG per tablet Take 1 tablet by mouth 4 (Four) Times a Day As Needed for Diarrhea.     • fluticasone (FLONASE) 50 MCG/ACT nasal spray 2 sprays into the nostril(s) as directed by provider  Daily.     • gabapentin (NEURONTIN) 300 MG capsule Take 300 mg by mouth 2 times daily.     • hydrALAZINE (APRESOLINE) 10 MG tablet Take 10 mg by mouth 2 times daily     • losartan (COZAAR) 25 MG tablet Take 25 mg by mouth     • nitroglycerin (NITROSTAT) 0.4 MG SL tablet Place 0.4 mg under the tongue every 5 minutes as needed.     • Omega 3 1200 MG capsule Take 1 capsule by mouth.     • phenylephrine-mineral oil-petrolatum (PREPARATION H) 0.25-14-74.9 % ointment hemorrhoidal ointment Insert  into the rectum 3 (Three) Times a Day As Needed.     • prasugrel (EFFIENT) 10 MG tablet Take 5 mg by mouth Daily.     • sodium bicarbonate 650 MG tablet Take 650 mg by mouth 3 times daily.     • tamsulosin (FLOMAX) 0.4 MG capsule 24 hr capsule Take 0.4 mg by mouth daily.     • vitamin D (ERGOCALCIFEROL) 1.25 MG (24013 UT) capsule capsule Take 50,000 Units by mouth Every 30 (Thirty) Days.     • hydrOXYzine (ATARAX) 10 MG tablet Take 10 mg by mouth daily     • leflunomide (ARAVA) 20 MG tablet Take 20 mg by mouth Daily. Holding for 2 weeks due to diarrhea per Dr. Crandall       No current facility-administered medications for this visit.      Current outpatient and discharge medications have been reconciled for the patient.  Reviewed by: Luis Felipe Bruner III, MD    The following portions of the patient's history were reviewed and updated as appropriate: allergies, current medications, past family history, past medical history, past social history, past surgical history and problem list.     REVIEW OF SYSTEMS  Review of Systems   Constitutional: Negative.  Negative for activity change, fatigue, fever and unexpected weight change.   HENT: Negative.  Negative for trouble swallowing.    Respiratory: Positive for shortness of breath. Negative for cough.    Cardiovascular: Negative.  Negative for chest pain and palpitations.   Gastrointestinal: Negative.    Genitourinary: Negative.    Musculoskeletal: Negative.    Skin: Negative.   "  Neurological: Negative.  Negative for weakness and headaches.   Hematological: Negative.  Negative for adenopathy.   Psychiatric/Behavioral: Negative.    All other systems reviewed and are negative.      PHYSICAL EXAM  VITAL SIGNS:   Vitals:    10/06/22 0920   BP: 95/51   Pulse: 66   SpO2: 90%  Comment: room air   Weight: 77.6 kg (171 lb)   Height: 170.2 cm (67\")   PainSc: 0-No pain      General Appearance:  awake, alert, oriented, in no acute distress.  Head: Normocephalic  Eyes: Conjunctiva pink, pupils equal and reactive.   Ears:  Normal externally.    Nose/Sinuses:  Mucosa normal.   Mouth/Throat:  Mucosa moist, no lesions; pharynx without erythema, edema or exudate.   Neck: Supple, no mass, non-tender  Back:  Symmetric, no curvature, ROM normal, no CVA tenderness   Lungs:  Normal expansion.  Clear to auscultation.  No rales, rhonchi, or wheezing.  Heart:  Heart sounds are normal.  Regular rate and rhythm without murmur, gallop or rub.  Abdomen:  Soft, non-tender, normal bowel sounds; no bruits, organomegaly or masses.  Extremities: Warm to touch, pink, with no edema. Pulses 2+ bilaterally  Musculoskeletal: strength and sensation grossly normal  Neurologic:  Alert and oriented, gait normal, non-focal exam  Psych exam: normal situational behavior   Skin:  Warm and moist. No suspicious lesions or rashes of concern    Performance Status: ECOG (0) Fully active, able to carry on all predisease performance without restriction    Clinical Quality Measures  -Pain Documented  Hoang Renteria reports a pain score of 0. Given his pain assessment as noted, treatment options were discussed and the following options were decided upon as a follow-up plan to address the patient's pain: No pain, no plan given.  Pain Medications             gabapentin (NEURONTIN) 300 MG capsule Take 300 mg by mouth 2 times daily.    leflunomide (ARAVA) 20 MG tablet Take 20 mg by mouth Daily. Holding for 2 weeks due to diarrhea per Dr. Crandall "        -Advanced Care Planning Advance Care Planning   ACP discussion was held with the patient during this visit. Patient does not have an advance directive, information provided.    -Body Mass Index Screening and Follow-Up Plan  Patient's Body mass index is 26.78 kg/m². indicating that he is overweight (BMI 25-29.9). Patient's (Body mass index is 26.78 kg/m².) indicates that they are overweight with health conditions that include none .     -Tobacco Use: Screening and Cessation Intervention Social History    Tobacco Use      Smoking status: Former        Packs/day: 1.00        Years: 65.00        Pack years: 65        Types: Cigarettes        Quit date: 2/10/2020        Years since quittin.6      Smokeless tobacco: Former    ASSESSMENT AND PLAN  1. Recurrent carcinoma of left lung (HCC)    2. Status post lobectomy of lung    3. Stage 2 moderate COPD by GOLD classification (HCC)    4. History of radiation therapy    5. Former smoker      Orders Placed This Encounter   Procedures   • CT Chest With Contrast     Standing Status:   Future     Standing Expiration Date:   10/6/2023     RECOMMENDATIONS:  Hoang Renteria is status post completion of radiation therapy to the lung and presents to our clinic today for surveillance exam and to review imaging.    Diagnosed in 2020 with Adenocarcinoma of lung, SANDRO 1.3 cm. Underwent SANDRO wedge resection on 2020.  · RECURRENCE: 2021 CT Chest revealed SANDRO nodularity increased, 1.8 cm. He completed 5000 cGy in 4 fractions to the SANDRO on 2021.    CT-scan of the chest on 2022 revealed stable appearance of the chest from previous study 2022. No radiographic evidence of localized recurrence or metastatic disease. Scarring within the periphery of the left upper lobe with involvement of the adjacent superior segment of the left lower lobe with associated pleural thickening and calcification. This is felt to represent post therapeutic change adjacent to  a staple line and is stable from the previous exam. No new or developing pulmonary nodules are present. No developing mediastinal, hilar or axillary adenopathy.    On exam, I do not see evidence for recurrent or metastatic disease at this time. We will continue routine follow-up/surveillance as discussed in 4 months with follow up CT scan prior to next visit. I have instructed him to continue to see the other health care providers as per their scheduling.    Patient Instructions   1) Return in 4 months with a CT chest before     Todays appointment time was spent in counseling, coordination of care and surveillance related to patients diagnosis as well as radiation therapy possible and probable after effects.   Luis Felipe Bruner III, MD  10/06/2022

## 2022-10-31 ENCOUNTER — OFFICE VISIT (OUTPATIENT)
Dept: GASTROENTEROLOGY | Facility: CLINIC | Age: 82
End: 2022-10-31

## 2022-10-31 VITALS
WEIGHT: 171 LBS | DIASTOLIC BLOOD PRESSURE: 82 MMHG | BODY MASS INDEX: 26.84 KG/M2 | HEART RATE: 76 BPM | OXYGEN SATURATION: 92 % | SYSTOLIC BLOOD PRESSURE: 122 MMHG | HEIGHT: 67 IN | TEMPERATURE: 97.2 F

## 2022-10-31 DIAGNOSIS — R19.4 CHANGE IN BOWEL HABIT: ICD-10-CM

## 2022-10-31 DIAGNOSIS — R13.10 DYSPHAGIA, UNSPECIFIED TYPE: Primary | ICD-10-CM

## 2022-10-31 PROCEDURE — 99214 OFFICE O/P EST MOD 30 MIN: CPT | Performed by: NURSE PRACTITIONER

## 2022-10-31 NOTE — PROGRESS NOTES
Chief Complaint   Patient presents with   • GI Problem     Loose bowels states he can't control them       PCP: Laura Weston MD  REFER: No ref. provider found    Subjective     HPI    Hoang Renteria presents to office with complain of persistent diarrhea x 6 months.  Diarrhea occurs 1-3 times per day.  No alleviating factors. Diarrhea not always related to food intake.   Prior to onset of diarrhea bowels described as more on constipated side.  Negative GI panel.  Diarrhea noted to be worse after change in arthritis medication (leflunomide).  He held leflunomide x 2 weeks without relief.    No nausea/vomitting.  Diarrhea has continued ost cholecystectomy 9/2022 (gallstone).  Diarrhea is not worse since cholecystectomy.    No bright red blood per rectum, no melena..  No weight loss.  No antibiotic use.   History of left lung cancer.    Today he denies heartburn but does experience dysphagia in upper esophagus.  He notes difficulty with solid and liquids.  He reports difficulty for years but worsening x 2-3 months.   prilsoec was held several months ago.     COLONOSCOPY (05/01/2019 06:59)    SCANNED - COLONOSCOPY (03/22/2016 00:00)    1-2 times per day  Past Medical History:   Diagnosis Date   • Hyperlipidemia    • Hypertension    • Shortness of breath 2/4/2020   • Stage 2 moderate COPD by GOLD classification (HCC) 2/4/2020       Past Surgical History:   Procedure Laterality Date   • COLONOSCOPY  03/22/2016    three polyps  all removed   • COLONOSCOPY N/A 5/1/2019    Procedure: COLONOSCOPY WITH ANESTHESIA;  Surgeon: Reddy Ann DO;  Location: [facility] ENDOSCOPY;  Service: Gastroenterology   • HERNIA REPAIR         Outpatient Medications Marked as Taking for the 10/31/22 encounter (Office Visit) with Brayan Rocha APRN   Medication Sig Dispense Refill   • allopurinol (ZYLOPRIM) 100 MG tablet Take 100 mg by mouth Daily.     • amLODIPine (NORVASC) 5 MG tablet Take 5 mg by mouth 2 times daily    "  • atenolol (TENORMIN) 25 MG tablet Take 25 mg by mouth daily.     • atorvastatin (LIPITOR) 10 MG tablet Take 10 mg by mouth 3 (Three) Times a Week. mon-wed-fri     • clobetasol (TEMOVATE) 0.05 % cream Apply  topically to the appropriate area as directed 2 (Two) Times a Day.     • Cobalamine Combinations (B-12) 100-5000 MCG sublingual tablet Place  under the tongue.     • diphenoxylate-atropine (LOMOTIL) 2.5-0.025 MG per tablet Take 1 tablet by mouth 4 (Four) Times a Day As Needed for Diarrhea.     • fluticasone (FLONASE) 50 MCG/ACT nasal spray 2 sprays into the nostril(s) as directed by provider Daily.     • gabapentin (NEURONTIN) 300 MG capsule Take 300 mg by mouth 2 times daily.     • hydrALAZINE (APRESOLINE) 10 MG tablet Take 10 mg by mouth 2 times daily     • leflunomide (ARAVA) 20 MG tablet Take 20 mg by mouth Daily. Holding for 2 weeks due to diarrhea per Dr. Crandall     • losartan (COZAAR) 25 MG tablet Take 25 mg by mouth     • nitroglycerin (NITROSTAT) 0.4 MG SL tablet Place 0.4 mg under the tongue every 5 minutes as needed.     • Omega 3 1200 MG capsule Take 1 capsule by mouth.     • prasugrel (EFFIENT) 10 MG tablet Take 5 mg by mouth Daily.     • sodium bicarbonate 650 MG tablet Take 650 mg by mouth 3 times daily.     • tamsulosin (FLOMAX) 0.4 MG capsule 24 hr capsule Take 0.4 mg by mouth daily.     • vitamin D (ERGOCALCIFEROL) 1.25 MG (71866 UT) capsule capsule Take 50,000 Units by mouth Every 30 (Thirty) Days.         Allergies   Allergen Reactions   • Sulfamethoxazole-Trimethoprim Other (See Comments)     He was told at the hospital he was allergic to this medication. \"affected his kidneys\"       Social History     Socioeconomic History   • Marital status:    Tobacco Use   • Smoking status: Former     Packs/day: 1.00     Years: 65.00     Pack years: 65.00     Types: Cigarettes     Quit date: 2/10/2020     Years since quittin.7   • Smokeless tobacco: Former   Substance and Sexual Activity " "  • Alcohol use: No   • Drug use: No   • Sexual activity: Defer       Review of Systems   Constitutional: Negative for unexpected weight change.   Respiratory: Negative for shortness of breath.    Cardiovascular: Negative for chest pain.   Gastrointestinal: Positive for diarrhea. Negative for abdominal pain and anal bleeding.       Objective     Vitals:    10/31/22 1246   BP: 122/82   Pulse: 76   Temp: 97.2 °F (36.2 °C)   TempSrc: Temporal   SpO2: 92%   Weight: 77.6 kg (171 lb)   Height: 170.2 cm (67\")     Body mass index is 26.78 kg/m².    Physical Exam  Constitutional:       Appearance: Normal appearance. He is well-developed.   Eyes:      General: No scleral icterus.  Cardiovascular:      Rate and Rhythm: Regular rhythm.      Heart sounds: Normal heart sounds. No murmur heard.  Pulmonary:      Effort: Pulmonary effort is normal. No accessory muscle usage.      Breath sounds: Normal breath sounds.   Abdominal:      General: Bowel sounds are normal. There is no distension.      Palpations: Abdomen is soft. There is no mass.      Tenderness: There is no abdominal tenderness. There is no guarding or rebound.   Skin:     General: Skin is warm and dry.      Coloration: Skin is not jaundiced.   Neurological:      Mental Status: He is alert.   Psychiatric:         Behavior: Behavior is cooperative.         Imaging Results (Most Recent)     None          Body mass index is 26.78 kg/m².    Assessment & Plan     Diagnoses and all orders for this visit:    1. Dysphagia, unspecified type (Primary)  -     Case Request; Standing  -     Implement Anesthesia Orders Day of Procedure; Standing  -     Obtain Informed Consent; Standing  -     Case Request    2. Change in bowel habit         ESOPHAGOGASTRODUODENOSCOPY WITH ANESTHESIA (N/A)    If EGD is negative may need to consider colonoscopy    Due to lung disease I do not feel Hoang Renteria needs to undergo procedure on same day.  Hoang Renteria and his wife verbalized " understanding and are in agreement that he may still need to undergo colonoscopy.     Advised pt to stop use of NSAIDs, Fish Oil, and MV 5 days prior to procedure, per Dr Ann protocol.  Tylenol based products are ok to take.  Pt verbalized understanding.    The risks of the endoscopy were discussed in detail.  We discussed the risks of perforation (one out of 1120-6444, riskier with dilation), bleeding (one out of 500), and the rare risks of infection, adverse reaction to anesthesia, respiratory failure, cardiac failure including MI and adverse reaction to medications, etc.  We discussed consequences that could occur if a risk were to develop such as the need for hospitalization, blood transfusion, surgical intervention, medications, pain and disability and death.  Alternatives include not doing anything, or pursuing an UGI series which only offers a diagnosis with potential less accuracy compared to EGD.  The patient verbalizes understanding and agrees to proceed.    Patient is to hold their anticoagulation medication per the direction of their prescribing provider,  Briana Mendez. This is to prevent any risk or complication from bleeding intra and post procedure. If they develop bleeding post procedure they are to go the emergency department for further evaluation and treatment immediately.  We will obtain clearance from prescribing provider requesting Effient will need to be held x 7 days prior to procedure.         Brayan Rocha, APRN  10/31/22          There are no Patient Instructions on file for this visit.

## 2022-11-01 PROBLEM — R13.10 DYSPHAGIA: Status: ACTIVE | Noted: 2022-11-01

## 2022-11-04 ENCOUNTER — TELEPHONE (OUTPATIENT)
Dept: GASTROENTEROLOGY | Facility: CLINIC | Age: 82
End: 2022-11-04

## 2022-11-16 ENCOUNTER — ANESTHESIA (OUTPATIENT)
Dept: GASTROENTEROLOGY | Facility: HOSPITAL | Age: 82
End: 2022-11-16

## 2022-11-16 ENCOUNTER — ANESTHESIA EVENT (OUTPATIENT)
Dept: GASTROENTEROLOGY | Facility: HOSPITAL | Age: 82
End: 2022-11-16

## 2022-11-16 ENCOUNTER — HOSPITAL ENCOUNTER (OUTPATIENT)
Facility: HOSPITAL | Age: 82
Setting detail: HOSPITAL OUTPATIENT SURGERY
Discharge: HOME | End: 2022-11-16
Attending: INTERNAL MEDICINE | Admitting: INTERNAL MEDICINE

## 2022-11-16 VITALS
TEMPERATURE: 96.8 F | HEART RATE: 70 BPM | SYSTOLIC BLOOD PRESSURE: 108 MMHG | OXYGEN SATURATION: 96 % | DIASTOLIC BLOOD PRESSURE: 66 MMHG | HEIGHT: 67 IN | RESPIRATION RATE: 15 BRPM | BODY MASS INDEX: 26.84 KG/M2 | WEIGHT: 171 LBS

## 2022-11-16 DIAGNOSIS — R13.10 DYSPHAGIA, UNSPECIFIED TYPE: ICD-10-CM

## 2022-11-16 PROCEDURE — 88305 TISSUE EXAM BY PATHOLOGIST: CPT | Performed by: INTERNAL MEDICINE

## 2022-11-16 PROCEDURE — 87081 CULTURE SCREEN ONLY: CPT | Performed by: INTERNAL MEDICINE

## 2022-11-16 PROCEDURE — 25010000002 PROPOFOL 10 MG/ML EMULSION: Performed by: NURSE ANESTHETIST, CERTIFIED REGISTERED

## 2022-11-16 PROCEDURE — 43239 EGD BIOPSY SINGLE/MULTIPLE: CPT | Performed by: INTERNAL MEDICINE

## 2022-11-16 RX ORDER — SODIUM CHLORIDE 0.9 % (FLUSH) 0.9 %
10 SYRINGE (ML) INJECTION EVERY 12 HOURS SCHEDULED
Status: DISCONTINUED | OUTPATIENT
Start: 2022-11-16 | End: 2022-11-16 | Stop reason: HOSPADM

## 2022-11-16 RX ORDER — SODIUM CHLORIDE 9 MG/ML
100 INJECTION, SOLUTION INTRAVENOUS CONTINUOUS
Status: DISCONTINUED | OUTPATIENT
Start: 2022-11-16 | End: 2022-11-16 | Stop reason: HOSPADM

## 2022-11-16 RX ORDER — PROPOFOL 10 MG/ML
VIAL (ML) INTRAVENOUS AS NEEDED
Status: DISCONTINUED | OUTPATIENT
Start: 2022-11-16 | End: 2022-11-16 | Stop reason: SURG

## 2022-11-16 RX ORDER — SODIUM CHLORIDE 0.9 % (FLUSH) 0.9 %
10 SYRINGE (ML) INJECTION AS NEEDED
Status: DISCONTINUED | OUTPATIENT
Start: 2022-11-16 | End: 2022-11-16 | Stop reason: HOSPADM

## 2022-11-16 RX ORDER — LIDOCAINE HYDROCHLORIDE 20 MG/ML
INJECTION, SOLUTION EPIDURAL; INFILTRATION; INTRACAUDAL; PERINEURAL AS NEEDED
Status: DISCONTINUED | OUTPATIENT
Start: 2022-11-16 | End: 2022-11-16 | Stop reason: SURG

## 2022-11-16 RX ADMIN — LIDOCAINE HYDROCHLORIDE 60 MG: 20 INJECTION, SOLUTION EPIDURAL; INFILTRATION; INTRACAUDAL; PERINEURAL at 08:51

## 2022-11-16 RX ADMIN — PROPOFOL 80 MG: 10 INJECTION, EMULSION INTRAVENOUS at 08:51

## 2022-11-16 RX ADMIN — SODIUM CHLORIDE 100 ML/HR: 9 INJECTION, SOLUTION INTRAVENOUS at 08:04

## 2022-11-16 NOTE — ANESTHESIA POSTPROCEDURE EVALUATION
"Patient: Hoang Renteria    Procedure Summary     Date: 11/16/22 Room / Location: [facility] ENDOSCOPY 5 / [facility]    Anesthesia Start: 0846 Anesthesia Stop: 0859    Procedure: ESOPHAGOGASTRODUODENOSCOPY WITH ANESTHESIA Diagnosis:       Dysphagia, unspecified type      (Dysphagia, unspecified type [R13.10])    Surgeons: Reddy Ann DO Provider: Nba Pak CRNA    Anesthesia Type: MAC ASA Status: 3          Anesthesia Type: MAC    Vitals  No vitals data found for the desired time range.          Post Anesthesia Care and Evaluation    Patient location during evaluation: PHASE II  Patient participation: complete - patient participated  Level of consciousness: awake and alert  Pain management: adequate    Airway patency: patent  Anesthetic complications: No anesthetic complications    Cardiovascular status: acceptable  Respiratory status: acceptable  Hydration status: acceptable    Comments: Blood pressure 117/59, pulse 79, temperature 96.8 °F (36 °C), temperature source Temporal, resp. rate 20, height 168.9 cm (66.5\"), weight 77.6 kg (171 lb), SpO2 92 %.    Pt discharged from PACU based on real score >8      "

## 2022-11-16 NOTE — ANESTHESIA PREPROCEDURE EVALUATION
Anesthesia Evaluation     Patient summary reviewed   no history of anesthetic complications:  NPO Solid Status: > 8 hours  NPO Liquid Status: > 4 hours           Airway   Mallampati: I  TM distance: >3 FB  Neck ROM: full  No difficulty expected  Dental    (+) upper dentures and lower dentures    Pulmonary    (+) a smoker Current Smoked day of surgery, COPD mild, wheezes,   (-) asthma, sleep apnea  Cardiovascular   Exercise tolerance: good (4-7 METS)    Patient on routine beta blocker and Beta blocker given within 24 hours of surgery    (+) hypertension, CAD, cardiac stents (2011) hyperlipidemia,   (-) past MI, angina    ROS comment: Off effient  4/24    Neuro/Psych  (-) seizures, TIA, CVA  GI/Hepatic/Renal/Endo    (+)  GERD,    (-) liver disease, no renal disease, diabetes    Musculoskeletal     Abdominal    Substance History      OB/GYN          Other                          Anesthesia Plan    ASA 3     MAC     intravenous induction     Anesthetic plan, risks, benefits, and alternatives have been provided, discussed and informed consent has been obtained with: patient.

## 2022-11-17 LAB
CYTO UR: NORMAL
LAB AP CASE REPORT: NORMAL
Lab: NORMAL
PATH REPORT.FINAL DX SPEC: NORMAL
PATH REPORT.GROSS SPEC: NORMAL
UREASE TISS QL: NEGATIVE

## 2022-11-22 ENCOUNTER — TELEPHONE (OUTPATIENT)
Dept: GASTROENTEROLOGY | Facility: CLINIC | Age: 82
End: 2022-11-22

## 2022-11-28 ENCOUNTER — TELEPHONE (OUTPATIENT)
Dept: GASTROENTEROLOGY | Facility: CLINIC | Age: 82
End: 2022-11-28

## 2022-11-28 NOTE — TELEPHONE ENCOUNTER
Hoang Renteria is on schedule to have colonoscopy this Friday    He has messaged and is in hospital    Please cancel off Dr Ann schedule    Thank you

## 2023-02-03 ENCOUNTER — HOSPITAL ENCOUNTER (OUTPATIENT)
Dept: CT IMAGING | Facility: HOSPITAL | Age: 83
Discharge: HOME | End: 2023-02-03
Payer: MEDICARE

## 2023-02-03 DIAGNOSIS — Z90.2 STATUS POST LOBECTOMY OF LUNG: ICD-10-CM

## 2023-02-03 DIAGNOSIS — J44.9 STAGE 2 MODERATE COPD BY GOLD CLASSIFICATION: ICD-10-CM

## 2023-02-03 DIAGNOSIS — C34.92 RECURRENT CARCINOMA OF LEFT LUNG: ICD-10-CM

## 2023-02-03 DIAGNOSIS — Z92.3 HISTORY OF RADIATION THERAPY: ICD-10-CM

## 2023-02-03 DIAGNOSIS — Z87.891 FORMER SMOKER: ICD-10-CM

## 2023-02-03 LAB — CREAT BLDA-MCNC: 1.9 MG/DL (ref 0.6–1.3)

## 2023-02-03 PROCEDURE — 71260 CT THORAX DX C+: CPT

## 2023-02-03 PROCEDURE — 25010000002 IOPAMIDOL 61 % SOLUTION

## 2023-02-03 PROCEDURE — 82565 ASSAY OF CREATININE: CPT

## 2023-02-03 RX ADMIN — IOPAMIDOL 70 ML: 612 INJECTION, SOLUTION INTRAVENOUS at 12:12

## 2023-02-07 ENCOUNTER — HOSPITAL ENCOUNTER (OUTPATIENT)
Dept: RADIATION ONCOLOGY | Facility: HOSPITAL | Age: 83
Setting detail: RADIATION/ONCOLOGY SERIES
End: 2023-02-07
Payer: MEDICARE

## 2023-02-07 NOTE — PROGRESS NOTES
[facility] Bruner MD      Charlie Francis, APRN  ____________________________________________________________  [facility]  Department of Radiation Oncology  [address] [place] [id number]  Office:  [phone]  Fax: [phone]    DATE: 02/08/2023   PATIENT: Hoang Renteria   1940                                 MEDICAL RECORD #: [id number]    Reason for Follow up Visit:  Hoang Renteria is a very pleasant 82 y.o. patient that completed radiation to the lung and returns to the clinic today for routine follow up exam. Reports SOB. Denies activity change, appetite change, unexpected weight change, nasuea/vomiting, diarrhea, light-headedness, weakness, and headaches. He continues to follow .     History of Present Illness  Diagnosed in March 2020 with Adenocarcinoma of lung, SANDRO. Treated with SANDRO wedge resection on 03/31/2020.  · RECURRENCE: 03/16/2021 CT Chest: SANDRO nodularity increased. He completed 5000 cGy in 4 fractions to the SANDRO on 05/07/2021.    06/22/2018 - CT Lung screening (Annual):  • Lung rads category 4A-there are several foci of nodularity within the lung. The largest of the nodules measure 6 mm in size in the the right lower lobe.   • There is also a focus of nodularity within the inferior lingular segment of the left upper lobe which radiographically has the appearance of atelectasis and/or scarring.   • The nodular component does measure approximately 1.5 cm in long axis and I feel would categorize this patient into a 4a category.   • Follow-up low dose CT in 3 months is recommended to assure stability of this particular finding.     09/24/2018 - CT Lung screening (Annual):  • Lung rads category   • Overall stable appearance of the nodules from the previous study of 6/22/2018 except for interval diminishment in size of a focus of atelectasis/scarring within the lingular segment of the left upper lobe.   • No new nodules are present.    • Follow-up low dose CT imaging in 12 months is recommended.     01/16/2020 - CT Chest with and without contrast:  • Left upper lobe with a 14 x 7 mm pulmonary nodule, previously 6 mm on 9/24/2018.   • Right lower lobe with 7 mm pleural-based pulmonary nodule, previously 7 mm on 9/24/2018.     01/24/2020 - PET Scan:  • Left upper lobe pulmonary nodule, only mildly hypermetabolic with a maximum density of 1.87 SUV, equivocal for malignancy. Follow-up recommended.   • No scintigraphic evidence of hypermetabolic neoplastic disease.     02/04/2020 - Pulmonary Function tests:  • FVC - 87   • FEV1 - 76   • FEF 25-75% - 42   • FEV1/FVC - 67.35   • DLCO - 55   • D/VAsb - 52     02/04/2020 - Appointment with :  • The series of images is reviewed.   • I am very concerned about the progressive growth, despite the equivocal pet result, with risk for lung cancer.   • Refer to CT surgery for eval for wedge +/- lobectomy. FEV1 ok, although DLCO is reduced.I am giving him a sample of anoro and we showed him how to use it. I do not know why epic did not embed that action under plan above, but I can't get the program to do that.   • Recommend smoking abstinence.   • On anticoagulation which will need to be held prior to surgery.    03/31/2020 - Lung, left upper lobe wedge resection - per :  • Moderately differentiated lung adenocarcinoma, margins negative.   • Carcinoma measures 1.3 cm in greatest dimension.   • Negative for evidence of pleural invasion.   • Surgical excision margins are negative for evidence of malignancy.   • Mild subpleural emphysematous changes involving nonneoplastic lung parenchyma.   AJCC STAGE: pT1a, pNx, pMx     05/06/2020 - Appointment with :  • We will plan follow-up in September with repeat chest CT as well as pulmonary function testing then.  We will try to see him for a physical real visit in the office.    • Unable to do physical exam over the phone other than just  listening to his voice.    • His voice is strong and able to complete full sentences and is in good spirits and good humor.    08/31/2020 - CT Chest without contrast:  • Postoperative change of thoracotomy with LEFT upper lobe wedge resection.   • No evidence of residual or metastatic disease.   • There is nodular thickening along the wedge resection line which is likely postoperative scarring but recommend continued imaging surveillance.     09/08/2020 - Appointment with :  • Patient seems to be stable from an oncology standpoint.  Latest CT of the chest at the end of last month at [facility] is negative.  We will continue to follow him with intermittent imaging.  He did have a wedge resection.  COPD is at least moderate based on his latest spirometry.    • He has not responded well to other inhalers in the past.    • I do have some Bevespi samples and will give him a trial of that, 2 puffs twice a day.  Coronavirus avoidance.    • We will follow him up with spirometry in a few months.    • We will get follow-up imaging next year as well.    • We will order that when he comes back in.    03/09/2021 - Appointment with :  • Will plan follow up ct. Pt gets all his films at [facility], so we will order it there and will need to get a disc to review. I told him to make sure we discuss result over phone.   • Discussed potential for data transfer failure between computer systems at the 2 institutions.   • Follow up in 6 months with spirometry. Continue mobilization as much as he can.   • Could benefit from pulm rehab once coronavirus pandemic improves.    03/16/2021 - CT Chest without contrast:  • There is suture material in the left upper lobe with adjacent nodularity. The adjacent soft tissue nodularity has increased insize since the prior exam, measuring 18 x 16 mm in size, previously measuring 10 x 6 mm in size. There is some adjacent parenchymal distortion, presumably related to scarring.    • There is a tiny 3 mm nodule in the medial left upper lobe, more conspicuous on the current exam (series 3 image 43).  • Several tiny nodules are seen along the left major fissure, presumably tiny intrafissural lymph nodes. There are dependent changes in the lung bases.   • There is mild diffuse bronchial wall thickening.   • There is some atelectasis versus scarring and bronchiectasis in the right middle lobe.   • There is a small pleural-based nodule in the lateral right lower lobe which measures approximately 8 mm in size, previously measuring 10 mm in size.   • Review of the visualized portion of the upper abdomen demonstrates multiple exophytic renal lesions, incompletely characterized on this exam.   • At least one appears hyperdense, suggesting hemorrhagic or proteinaceous cyst.   Impression:  • Soft tissue nodule adjacent to suture material in the left upper lobe is concerning for recurrent disease.   • Multiple renal lesions, incompletely characterized on this exam. Follow-up nonemergent renal ultrasound recommended.   • Atherosclerosis of the aorta and coronary arteries.    03/19/2021 - Appointment with :  • We discussed the findings and reviewed the film Primary and most substantial concern is possible recurrence of lung cancer in patient with prior smoking history.  • Other possibilities include hypertrophic scarring at surgical site, reaction to coronavirus vaccine (doubt, but brought up by pt), granuloma or other benign disease, metastatic lesion from renal lesions (also doubtful).   • Will plan PET scan.   • If abnormal and suggestive of malignancy, and no other sites identified, then he is a candidate for stereotactic radiosurgery.   • If negative or equivocal, then ongoing follow up could be considered as an alternative.   • Tissue diagnosis would likely require robotic bronchoscopy.   • COPD is stable    03/31/2021 - PET Scan:  • An 18 mm nodule in the left upper lobe demonstrates an SUV  of 5.9.  • Review of the mediastinum reveals no hypermetabolic lymphadenopathy.   Impression:  • Solitary left upper lobe pulmonary nodule with abnormal SUV 5.9 allograft   • There are no other regions of abnormal metabolic activity.     04/07/2021 - Appointment with :  • Following this discussion and in consideration of the diagnostic data/evaluation of the patient, I recommended a course of stereotactic radiosurgery to the SANDRO nodule, will simulate treatment fields today, 3D CT with MIPS to begin the planning process, final course pending.    04/27/2021 - 05/07/2021 - Completed Radiation course:  • Received 5000 cGy in 4 fractions to left upper lobe of lung via stereotactic radiosurgery.    05/19/2021 - Appointment with :  • Return in about 3 months (around 8/19/2021).    08/02/2021 - CT Chest:  • Decreased soft tissue component along the left upper lobe suture  line with probable postradiation changes.   • Stable right lower lobe 8 mm pulmonary nodule compared to 3/16/2021.   • Heavily calcified coronary arteries versus stent.     08/09/2021 - Appointment with :  • Your scan looks great, you are in remission!   • Return to Dr. Bruner in 3 months with a CT scan before.     10/27/2021 - CT chest without contrast:  • Increasing left upper and superior segment left lower lobe opacities compared to 8/2/2021 which may represent sequelae of radiation. Superimposed pneumonia considered. Similar postoperative changes of the left upper lung. Stable 8 mm subpleural right lower lobe nodule. No new dominant nodules identified.   • Left coronary artery stents and/or calcifications.     11/10/2021 - Appointment with :  • I have ordered a PET scan for further evaluation of the increased left upper lobe opacities noted on the CT scan. We will continue routine follow-up/surveillance as discussed in 3 months with follow up CT scan before visit and I have instructed him to continue to see the other  health care providers as per their scheduling.    11/12/2021 - PET Scan:  • There is diffuse uptake in the left upper lobe and superior segment left lower lobe that corresponds to infiltrates seen on recent CT. The SUV max is 4.3. Residual tumor in this area cannot be ruled out. Most of the uptake is likely infectious/inflammatory.  • No evidence of any distant metastatic disease.    02/07/2022 - CT Chest with contrast:  • Scarlike infiltrate with focal calcification in the left upper lobe is slightly smaller now and postradiation changes favored.  • No new lung abnormality.    02/10/2022 - Appointment with :  • Follow up in 3 months     03/11/2022 - CT Abdomen/Pelvis with contrast:  • 1.5 cm left lower renal pole enhancing mass is most concerning for renal cell carcinoma.   • Asymmetric soft tissue density in the posterior dome of the bladder wall. Although the bladder isn't very distended, underlying opacity is not excluded.     05/05/2022 - CT Chest with contrast:  • Continued decrease in area of masslike consolidation in the LEFT upper lobe along the wedge resection suture line now measuring 3.6 x 2.7 cm on axial image 46 (previously 4.2 by 3.2 cm).   • Surrounding groundglass is also decreased.   • No change in subpleural 8 mm triangular nodule in the RIGHT lower lobe on image 90.   • No new or enlarging pulmonary nodule.    05/11/2022 - Appointment with :  • Follow up in 4 months with Chest CT    06/13/2022 - Appointment with :  • Follow up in one year     09/29/2022 - CT chest with contrast:  • Stable appearance of the chest from previous study 5/5/2022. No radiographic evidence of localized recurrence or metastatic disease.  • Scarring within the periphery of the left upper lobe with involvement of the adjacent superior segment of the left lower lobe with associated pleural thickening and calcification. This is felt to represent post therapeutic change adjacent to a staple line and  is stable from the previous exam. No new or developing pulmonary nodules are present. No developing mediastinal, hilar or axillary adenopathy.     10/06/2022 - Appointment with :  • Follow up in 4 months     02/03/2023 - CT Chest with contrast:  • No evidence of recurrent or metastatic disease. Stable posttreatment change in the LEFT upper chest.  • There is a single new 4 mm pulmonary nodule in the RIGHT upper lobe. Favor this to be related to an infectious or inflammatory process but recommend follow-up imaging to confirm resolution.    History obtained from  PATIENT, FAMILY and CHART    PAST MEDICAL HISTORY  Past Medical History:   Diagnosis Date   • Cancer     lung   • Hyperlipidemia    • Hypertension    • Shortness of breath 02/04/2020   • Stage 2 moderate COPD by GOLD classification 02/04/2020      PAST SURGICAL HISTORY  Past Surgical History:   Procedure Laterality Date   • CHOLECYSTECTOMY  09/2022   • COLONOSCOPY  03/22/2016    three polyps  all removed   • COLONOSCOPY N/A 05/01/2019    Procedure: COLONOSCOPY WITH ANESTHESIA;  Surgeon: Reddy Ann DO;  Location: [facility] ENDOSCOPY;  Service: Gastroenterology   • COLONOSCOPY N/A 4/7/2023    Procedure: COLONOSCOPY WITH ANESTHESIA;  Surgeon: Reddy Ann DO;  Location: [facility] ENDOSCOPY;  Service: Gastroenterology;  Laterality: N/A;  Pre: Diarrhea, History of adenomatous polyp of colon  Post: polyp  Laura Weston MD   • ENDOSCOPY N/A 11/16/2022    Procedure: ESOPHAGOGASTRODUODENOSCOPY WITH ANESTHESIA;  Surgeon: Reddy Ann DO;  Location: [facility] ENDOSCOPY;  Service: Gastroenterology;  Laterality: N/A;  Pre: Dysphagia  Post: Duodenitis  Laura Weston MD   • HERNIA REPAIR        FAMILY HISTORY  family history includes Cancer in his mother; Colon polyps in his sister.    SOCIAL HISTORY  Social History     Tobacco Use   • Smoking status: Former     Packs/day: 1.00     Years: 65.00     Pack years: 65.00     Types:  Cigarettes     Quit date: 2/10/2020     Years since quitting: 3.1   • Smokeless tobacco: Former   Vaping Use   • Vaping Use: Never used   Substance Use Topics   • Alcohol use: Not Currently   • Drug use: No      Sulfamethoxazole-trimethoprim     MEDICATIONS  Current Outpatient Medications   Medication Sig Dispense Refill   • albuterol sulfate  (90 Base) MCG/ACT inhaler Inhale 2 puffs Every 4 (Four) Hours As Needed for Wheezing.     • allopurinol (ZYLOPRIM) 100 MG tablet Take 1 tablet by mouth Daily.     • amLODIPine (NORVASC) 5 MG tablet Take 5 mg by mouth 2 times daily     • atenolol (TENORMIN) 25 MG tablet Take 25 mg by mouth daily.     • atorvastatin (LIPITOR) 10 MG tablet Take 1 tablet by mouth 3 (Three) Times a Week. mon-wed-fri     • clobetasol (TEMOVATE) 0.05 % cream Apply  topically to the appropriate area as directed 2 (Two) Times a Day.     • Cobalamine Combinations (B-12) 100-5000 MCG sublingual tablet Place  under the tongue.     • diphenoxylate-atropine (LOMOTIL) 2.5-0.025 MG per tablet Take 1 tablet by mouth 4 (Four) Times a Day As Needed for Diarrhea.     • FeroSul 325 (65 Fe) MG tablet Take 1 tablet by mouth Daily.     • fluticasone (FLONASE) 50 MCG/ACT nasal spray 2 sprays into the nostril(s) as directed by provider Daily.     • gabapentin (NEURONTIN) 300 MG capsule Take 300 mg by mouth 2 times daily.     • hydrALAZINE (APRESOLINE) 10 MG tablet Take 10 mg by mouth 2 times daily     • leflunomide (ARAVA) 20 MG tablet Take 1 tablet by mouth Daily. Holding for 2 weeks due to diarrhea per Dr. Crandall     • losartan (COZAAR) 25 MG tablet Take 25 mg by mouth     • nitroglycerin (NITROSTAT) 0.4 MG SL tablet Place 0.4 mg under the tongue every 5 minutes as needed.     • Omega 3 1200 MG capsule Take 1 capsule by mouth.     • prasugrel (EFFIENT) 10 MG tablet Take 5 mg by mouth Daily.     • sodium bicarbonate 650 MG tablet 2 (Two) Times a Day. 2 in am 1 in pm.     • tamsulosin (FLOMAX) 0.4 MG capsule  "24 hr capsule Take 0.4 mg by mouth daily.     • vitamin D (ERGOCALCIFEROL) 1.25 MG (52950 UT) capsule capsule Take 1 capsule by mouth Every 30 (Thirty) Days.     • phenylephrine-mineral oil-petrolatum (PREPARATION H) 0.25-14-74.9 % ointment hemorrhoidal ointment Insert  into the rectum 3 (Three) Times a Day As Needed.     • sodium bicarbonate 325 MG tablet Take 1 tablet by mouth 2 (Two) Times a Day. 2 tabs in am, 1 tab in pm       No current facility-administered medications for this visit.      Current outpatient and discharge medications have been reconciled for the patient.  Reviewed by: Luis Felipe Bruner III, MD    The following portions of the patient's history were reviewed and updated as appropriate: allergies, current medications, past family history, past medical history, past social history, past surgical history and problem list.     REVIEW OF SYSTEMS  Review of Systems   Constitutional: Negative.  Negative for activity change and fatigue.   HENT: Negative.    Respiratory: Positive for shortness of breath. Negative for cough and wheezing.    Cardiovascular: Negative.  Negative for chest pain.   Gastrointestinal: Negative.    Genitourinary: Negative.    Musculoskeletal: Negative.    Skin: Negative.    Neurological: Negative.  Negative for dizziness, weakness and headaches.   Hematological: Negative.  Negative for adenopathy.   Psychiatric/Behavioral: Negative.    All other systems reviewed and are negative.      PHYSICAL EXAM  VITAL SIGNS:   Vitals:    02/08/23 0947   BP: 133/61   Pulse: 81   SpO2: 91%  Comment: room air   Weight: 79.8 kg (176 lb)   Height: 168.9 cm (66.5\")   PainSc: 0-No pain      General Appearance:  awake, alert, oriented, in no acute distress.  Head: Normocephalic  Neck: Supple, no mass, non-tender  Back:  Symmetric, no curvature, ROM normal  Lungs:  Normal expansion.  Clear to auscultation.  No rales, rhonchi, or wheezing.  Heart:  Heart sounds are normal.  Regular rate and rhythm " without murmur, gallop or rub.  Abdomen:  Soft, non-tender, normal bowel sounds  Extremities: Warm to touch, pink, with no edema. Pulses 2+ bilaterally  Musculoskeletal: strength and sensation grossly normal  Neurologic:  Alert and oriented, gait normal, non-focal exam  Psych exam: normal situational behavior   Skin:  Warm and moist. No suspicious lesions or rashes of concern    Performance Status: ECOG (0) Fully active, able to carry on all predisease performance without restriction    Clinical Quality Measures  -Pain Documented  Hoang Renteria reports a pain score of 0. Given his pain assessment as noted, treatment options were discussed and the following options were decided upon as a follow-up plan to address the patient's pain: No pain, no plan given.  Pain Medications             gabapentin (NEURONTIN) 300 MG capsule Take 300 mg by mouth 2 times daily.    leflunomide (ARAVA) 20 MG tablet Take 1 tablet by mouth Daily. Holding for 2 weeks due to diarrhea per Dr. Crandall        -Advanced Care Planning Advance Care Planning   ACP discussion was held with the patient during this visit. Patient does not have an advance directive, information provided.    -Body Mass Index Screening and Follow-Up Plan  Patient's Body mass index is 27.98 kg/m². indicating that he is overweight (BMI 25-29.9). Patient's (Body mass index is 27.98 kg/m².) indicates that they are overweight with health conditions that include none .     -Tobacco Use: Screening and Cessation Intervention Social History    Tobacco Use      Smoking status: Former        Packs/day: 1.00        Years: 65.00        Pack years: 65        Types: Cigarettes        Quit date: 2/10/2020        Years since quitting: 3.1      Smokeless tobacco: Former    ASSESSMENT AND PLAN  1. Recurrent carcinoma of left lung    2. Status post lobectomy of lung    3. 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 History of radiation therapy    5. Former smoker      Orders  Placed This Encounter   Procedures   • CT Chest With Contrast     Standing Status:   Future     Standing Expiration Date:   2/8/2024     RECOMMENDATIONS:  Hoang Renteria is status post completion of radiation therapy to the lung and presents to our clinic today for surveillance exam and to review imaging. Diagnosed in March 2020 with Adenocarcinoma of lung, SANDRO 1.3 cm. Underwent SANDRO wedge resection on 03/31/2020.  · RECURRENCE: 03/16/2021 CT Chest revealed SANDRO nodularity increased, 1.8 cm. He completed 5000 cGy in 4 fractions to the SANDRO on 05/07/2021.    CT-scan of the chest on 02/03/2023 revealed no evidence of recurrent or metastatic disease. Stable posttreatment change in the LEFT upper chest. There is a single new 4 mm pulmonary nodule in the RIGHT upper lobe. Favor this to be related to an infectious or inflammatory process but recommend follow-up imaging to confirm resolution.    On exam, I do not see evidence for recurrent or metastatic disease at this time. We will continue routine follow-up/surveillance as discussed in 3 months with follow up CT scan prior to next visit. I have instructed him to continue to see the other health care providers as per their scheduling.    Patient Instructions   1) Return in 3 months with a CT chest before     Todays appointment time was spent in counseling, coordination of care and surveillance related to patients diagnosis as well as radiation therapy possible and probable after effects.   Luis Felipe Bruner III, MD  02/08/2023

## 2023-02-08 ENCOUNTER — OFFICE VISIT (OUTPATIENT)
Dept: RADIATION ONCOLOGY | Facility: HOSPITAL | Age: 83
End: 2023-02-08
Payer: MEDICARE

## 2023-02-08 VITALS
WEIGHT: 176 LBS | BODY MASS INDEX: 27.62 KG/M2 | HEART RATE: 81 BPM | HEIGHT: 67 IN | OXYGEN SATURATION: 91 % | DIASTOLIC BLOOD PRESSURE: 61 MMHG | SYSTOLIC BLOOD PRESSURE: 133 MMHG

## 2023-02-08 DIAGNOSIS — C34.92 RECURRENT CARCINOMA OF LEFT LUNG: Primary | ICD-10-CM

## 2023-02-08 DIAGNOSIS — Z92.3 HISTORY OF RADIATION THERAPY: ICD-10-CM

## 2023-02-08 DIAGNOSIS — Z87.891 FORMER SMOKER: ICD-10-CM

## 2023-02-08 DIAGNOSIS — J44.9 STAGE 2 MODERATE COPD BY GOLD CLASSIFICATION: ICD-10-CM

## 2023-02-08 DIAGNOSIS — Z90.2 STATUS POST LOBECTOMY OF LUNG: ICD-10-CM

## 2023-02-08 PROCEDURE — G0463 HOSPITAL OUTPT CLINIC VISIT: HCPCS | Performed by: RADIOLOGY

## 2023-02-08 RX ORDER — ALBUTEROL SULFATE 90 UG/1
2 AEROSOL, METERED RESPIRATORY (INHALATION) EVERY 4 HOURS PRN
COMMUNITY
Start: 2022-12-04

## 2023-02-08 RX ORDER — FERROUS SULFATE 325(65) MG
1 TABLET ORAL DAILY
COMMUNITY
Start: 2022-12-07

## 2023-03-30 ENCOUNTER — OFFICE VISIT (OUTPATIENT)
Dept: GASTROENTEROLOGY | Facility: CLINIC | Age: 83
End: 2023-03-30
Payer: MEDICARE

## 2023-03-30 VITALS
DIASTOLIC BLOOD PRESSURE: 68 MMHG | SYSTOLIC BLOOD PRESSURE: 126 MMHG | BODY MASS INDEX: 27.94 KG/M2 | TEMPERATURE: 97 F | OXYGEN SATURATION: 91 % | HEART RATE: 84 BPM | WEIGHT: 178 LBS | HEIGHT: 67 IN

## 2023-03-30 DIAGNOSIS — R19.8 ALTERED BOWEL FUNCTION: ICD-10-CM

## 2023-03-30 DIAGNOSIS — R19.7 DIARRHEA, UNSPECIFIED TYPE: Primary | ICD-10-CM

## 2023-03-30 DIAGNOSIS — Z79.01 ANTICOAGULATED: ICD-10-CM

## 2023-03-30 NOTE — PROGRESS NOTES
Chief Complaint   Patient presents with   • Diarrhea     diarhea       PCP: Laura Weston MD  REFER: No ref. provider found    Subjective     HPI    Hoang Renteria returns to office today with complain of diarrhea. He was seen in the office in Oct 2022 with similar complaints.   scheduled colonoscopy had to be cancelled due to hospitalization.    He continues to experience diarrhea on daily basis x 18 months.  Number of bowel movement vary from 8-10 to 4-8 per day.  He has tried to make medication adjustments without relief in diarrhea.  No abdominal pain, no bright red blood, no melena.    Weight is stable.   Found to be low on iron during hospitalization Nov 2022.     UPPER GI ENDOSCOPY (11/16/2022 08:46)      COLONOSCOPY (05/01/2019 06:59)   SCANNED - COLONOSCOPY (03/22/2016 00:00    Past Medical History:   Diagnosis Date   • Cancer (HCC)     lung   • Hyperlipidemia    • Hypertension    • Shortness of breath 02/04/2020   • Stage 2 moderate COPD by GOLD classification ([facility]) 02/04/2020       Past Surgical History:   Procedure Laterality Date   • CHOLECYSTECTOMY  09/2022   • COLONOSCOPY  03/22/2016    three polyps  all removed   • COLONOSCOPY N/A 05/01/2019    Procedure: COLONOSCOPY WITH ANESTHESIA;  Surgeon: Reddy Ann DO;  Location: [facility] ENDOSCOPY;  Service: Gastroenterology   • ENDOSCOPY N/A 11/16/2022    Procedure: ESOPHAGOGASTRODUODENOSCOPY WITH ANESTHESIA;  Surgeon: Reddy Ann DO;  Location: [facility] ENDOSCOPY;  Service: Gastroenterology;  Laterality: N/A;  Pre: Dysphagia  Post: Duodenitis  Laura Weston MD   • HERNIA REPAIR         Outpatient Medications Marked as Taking for the 3/30/23 encounter (Office Visit) with Brayan Rocha APRN   Medication Sig Dispense Refill   • albuterol sulfate  (90 Base) MCG/ACT inhaler Inhale 2 puffs Every 4 (Four) Hours As Needed for Wheezing.     • allopurinol (ZYLOPRIM) 100 MG tablet Take 1 tablet by mouth Daily.     •  "amLODIPine (NORVASC) 5 MG tablet Take 5 mg by mouth 2 times daily     • atenolol (TENORMIN) 25 MG tablet Take 25 mg by mouth daily.     • atorvastatin (LIPITOR) 10 MG tablet Take 1 tablet by mouth 3 (Three) Times a Week. mon-wed-fri     • clobetasol (TEMOVATE) 0.05 % cream Apply  topically to the appropriate area as directed 2 (Two) Times a Day.     • Cobalamine Combinations (B-12) 100-5000 MCG sublingual tablet Place  under the tongue.     • diphenoxylate-atropine (LOMOTIL) 2.5-0.025 MG per tablet Take 1 tablet by mouth 4 (Four) Times a Day As Needed for Diarrhea.     • FeroSul 325 (65 Fe) MG tablet Take 1 tablet by mouth Daily.     • fluticasone (FLONASE) 50 MCG/ACT nasal spray 2 sprays into the nostril(s) as directed by provider Daily.     • gabapentin (NEURONTIN) 300 MG capsule Take 300 mg by mouth 2 times daily.     • hydrALAZINE (APRESOLINE) 10 MG tablet Take 10 mg by mouth 2 times daily     • leflunomide (ARAVA) 20 MG tablet Take 1 tablet by mouth Daily. Holding for 2 weeks due to diarrhea per Dr. Crandall     • losartan (COZAAR) 25 MG tablet Take 25 mg by mouth     • nitroglycerin (NITROSTAT) 0.4 MG SL tablet Place 0.4 mg under the tongue every 5 minutes as needed.     • Omega 3 1200 MG capsule Take 1 capsule by mouth.     • prasugrel (EFFIENT) 10 MG tablet Take 5 mg by mouth Daily.     • sodium bicarbonate 650 MG tablet Take 650 mg by mouth 3 times daily.     • tamsulosin (FLOMAX) 0.4 MG capsule 24 hr capsule Take 0.4 mg by mouth daily.     • vitamin D (ERGOCALCIFEROL) 1.25 MG (08936 UT) capsule capsule Take 1 capsule by mouth Every 30 (Thirty) Days.         Allergies   Allergen Reactions   • Sulfamethoxazole-Trimethoprim Other (See Comments)     He was told at the hospital he was allergic to this medication. \"affected his kidneys\"       Social History     Socioeconomic History   • Marital status:    Tobacco Use   • Smoking status: Former     Packs/day: 1.00     Years: 65.00     Pack years: 65.00     " "Types: Cigarettes     Quit date: 2/10/2020     Years since quitting: 3.1   • Smokeless tobacco: Former   Vaping Use   • Vaping Use: Never used   Substance and Sexual Activity   • Alcohol use: Not Currently   • Drug use: No   • Sexual activity: Defer       Review of Systems   Constitutional: Negative for unexpected weight change.   Respiratory: Negative for shortness of breath.    Cardiovascular: Negative for chest pain.   Gastrointestinal: Positive for diarrhea. Negative for abdominal pain and anal bleeding.       Objective     Vitals:    03/30/23 1347   BP: 126/68   Pulse: 84   Temp: 97 °F (36.1 °C)   SpO2: 91%   Weight: 80.7 kg (178 lb)   Height: 168.9 cm (66.5\")     Body mass index is 28.3 kg/m².    Physical Exam  Constitutional:       Appearance: Normal appearance. He is well-developed.   Eyes:      General: No scleral icterus.  Cardiovascular:      Rate and Rhythm: Regular rhythm.      Heart sounds: Normal heart sounds. No murmur heard.  Pulmonary:      Effort: Pulmonary effort is normal. No accessory muscle usage.      Breath sounds: Normal breath sounds.   Abdominal:      General: Bowel sounds are normal. There is no distension.      Palpations: Abdomen is soft. There is no mass.      Tenderness: There is no abdominal tenderness. There is no guarding or rebound.   Skin:     General: Skin is warm and dry.      Coloration: Skin is not jaundiced.   Neurological:      Mental Status: He is alert.   Psychiatric:         Behavior: Behavior is cooperative.         Imaging Results (Most Recent)     None          Body mass index is 28.3 kg/m².    Assessment & Plan     Diagnoses and all orders for this visit:    1. Diarrhea, unspecified type (Primary)  -     Case Request; Standing  -     Case Request    2. Altered bowel function    3. Anticoagulated    Other orders  -     Implement Anesthesia Orders Day of Procedure; Standing  -     Obtain Informed Consent; Standing         COLONOSCOPY WITH ANESTHESIA (N/A)    If " colonoscopy/biopsy negative, consider colestid    Ok to hold effient x 7 days prior to procedure    Patient is to continue all blood pressure and cardiac medications prior to procedure and has been advised to take medications morning of procedure   Pt verbalized understanding       Advised pt to stop use of NSAIDs, Fish Oil, and MV 5 days prior to procedure, per Dr Ann protocol.  Tylenol based products are ok to take.  Pt verbalized understanding.      All risks, benefits, alternatives, and indications of colonoscopy procedure have been discussed with the patient. Risks to include perforation of the colon requiring possible surgery or colostomy, risk of bleeding from biopsies or removal of colon tissue, possibility of missing a colon polyp or cancer, or adverse drug reaction.  Benefits to include the diagnosis and management of disease of the colon and rectum. Alternatives to include barium enema, radiographic evaluation, lab testing or no intervention. Pt verbalizes understanding and agrees to proceed with procedure.          Brayan Rocha, APRN  03/30/23          There are no Patient Instructions on file for this visit.

## 2023-03-30 NOTE — H&P (VIEW-ONLY)
Chief Complaint   Patient presents with   • Diarrhea     diarhea       PCP: Laura Weston MD  REFER: No ref. provider found    Subjective     HPI    Hoang Renteria returns to office today with complain of diarrhea. He was seen in the office in Oct 2022 with similar complaints.   scheduled colonoscopy had to be cancelled due to hospitalization.    He continues to experience diarrhea on daily basis x 18 months.  Number of bowel movement vary from 8-10 to 4-8 per day.  He has tried to make medication adjustments without relief in diarrhea.  No abdominal pain, no bright red blood, no melena.    Weight is stable.   Found to be low on iron during hospitalization Nov 2022.     UPPER GI ENDOSCOPY (11/16/2022 08:46)      COLONOSCOPY (05/01/2019 06:59)   SCANNED - COLONOSCOPY (03/22/2016 00:00    Past Medical History:   Diagnosis Date   • Cancer (HCC)     lung   • Hyperlipidemia    • Hypertension    • Shortness of breath 02/04/2020   • Stage 2 moderate COPD by GOLD classification ([facility]) 02/04/2020       Past Surgical History:   Procedure Laterality Date   • CHOLECYSTECTOMY  09/2022   • COLONOSCOPY  03/22/2016    three polyps  all removed   • COLONOSCOPY N/A 05/01/2019    Procedure: COLONOSCOPY WITH ANESTHESIA;  Surgeon: Reddy Ann DO;  Location: [facility] ENDOSCOPY;  Service: Gastroenterology   • ENDOSCOPY N/A 11/16/2022    Procedure: ESOPHAGOGASTRODUODENOSCOPY WITH ANESTHESIA;  Surgeon: Reddy Ann DO;  Location: [facility] ENDOSCOPY;  Service: Gastroenterology;  Laterality: N/A;  Pre: Dysphagia  Post: Duodenitis  Laura Weston MD   • HERNIA REPAIR         Outpatient Medications Marked as Taking for the 3/30/23 encounter (Office Visit) with Brayan Rocha APRN   Medication Sig Dispense Refill   • albuterol sulfate  (90 Base) MCG/ACT inhaler Inhale 2 puffs Every 4 (Four) Hours As Needed for Wheezing.     • allopurinol (ZYLOPRIM) 100 MG tablet Take 1 tablet by mouth Daily.     •  "amLODIPine (NORVASC) 5 MG tablet Take 5 mg by mouth 2 times daily     • atenolol (TENORMIN) 25 MG tablet Take 25 mg by mouth daily.     • atorvastatin (LIPITOR) 10 MG tablet Take 1 tablet by mouth 3 (Three) Times a Week. mon-wed-fri     • clobetasol (TEMOVATE) 0.05 % cream Apply  topically to the appropriate area as directed 2 (Two) Times a Day.     • Cobalamine Combinations (B-12) 100-5000 MCG sublingual tablet Place  under the tongue.     • diphenoxylate-atropine (LOMOTIL) 2.5-0.025 MG per tablet Take 1 tablet by mouth 4 (Four) Times a Day As Needed for Diarrhea.     • FeroSul 325 (65 Fe) MG tablet Take 1 tablet by mouth Daily.     • fluticasone (FLONASE) 50 MCG/ACT nasal spray 2 sprays into the nostril(s) as directed by provider Daily.     • gabapentin (NEURONTIN) 300 MG capsule Take 300 mg by mouth 2 times daily.     • hydrALAZINE (APRESOLINE) 10 MG tablet Take 10 mg by mouth 2 times daily     • leflunomide (ARAVA) 20 MG tablet Take 1 tablet by mouth Daily. Holding for 2 weeks due to diarrhea per Dr. Crandall     • losartan (COZAAR) 25 MG tablet Take 25 mg by mouth     • nitroglycerin (NITROSTAT) 0.4 MG SL tablet Place 0.4 mg under the tongue every 5 minutes as needed.     • Omega 3 1200 MG capsule Take 1 capsule by mouth.     • prasugrel (EFFIENT) 10 MG tablet Take 5 mg by mouth Daily.     • sodium bicarbonate 650 MG tablet Take 650 mg by mouth 3 times daily.     • tamsulosin (FLOMAX) 0.4 MG capsule 24 hr capsule Take 0.4 mg by mouth daily.     • vitamin D (ERGOCALCIFEROL) 1.25 MG (64796 UT) capsule capsule Take 1 capsule by mouth Every 30 (Thirty) Days.         Allergies   Allergen Reactions   • Sulfamethoxazole-Trimethoprim Other (See Comments)     He was told at the hospital he was allergic to this medication. \"affected his kidneys\"       Social History     Socioeconomic History   • Marital status:    Tobacco Use   • Smoking status: Former     Packs/day: 1.00     Years: 65.00     Pack years: 65.00     " "Types: Cigarettes     Quit date: 2/10/2020     Years since quitting: 3.1   • Smokeless tobacco: Former   Vaping Use   • Vaping Use: Never used   Substance and Sexual Activity   • Alcohol use: Not Currently   • Drug use: No   • Sexual activity: Defer       Review of Systems   Constitutional: Negative for unexpected weight change.   Respiratory: Negative for shortness of breath.    Cardiovascular: Negative for chest pain.   Gastrointestinal: Positive for diarrhea. Negative for abdominal pain and anal bleeding.       Objective     Vitals:    03/30/23 1347   BP: 126/68   Pulse: 84   Temp: 97 °F (36.1 °C)   SpO2: 91%   Weight: 80.7 kg (178 lb)   Height: 168.9 cm (66.5\")     Body mass index is 28.3 kg/m².    Physical Exam  Constitutional:       Appearance: Normal appearance. He is well-developed.   Eyes:      General: No scleral icterus.  Cardiovascular:      Rate and Rhythm: Regular rhythm.      Heart sounds: Normal heart sounds. No murmur heard.  Pulmonary:      Effort: Pulmonary effort is normal. No accessory muscle usage.      Breath sounds: Normal breath sounds.   Abdominal:      General: Bowel sounds are normal. There is no distension.      Palpations: Abdomen is soft. There is no mass.      Tenderness: There is no abdominal tenderness. There is no guarding or rebound.   Skin:     General: Skin is warm and dry.      Coloration: Skin is not jaundiced.   Neurological:      Mental Status: He is alert.   Psychiatric:         Behavior: Behavior is cooperative.         Imaging Results (Most Recent)     None          Body mass index is 28.3 kg/m².    Assessment & Plan     Diagnoses and all orders for this visit:    1. Diarrhea, unspecified type (Primary)  -     Case Request; Standing  -     Case Request    2. Altered bowel function    3. Anticoagulated    Other orders  -     Implement Anesthesia Orders Day of Procedure; Standing  -     Obtain Informed Consent; Standing         COLONOSCOPY WITH ANESTHESIA (N/A)    If " colonoscopy/biopsy negative, consider colestid    Ok to hold effient x 7 days prior to procedure    Patient is to continue all blood pressure and cardiac medications prior to procedure and has been advised to take medications morning of procedure   Pt verbalized understanding       Advised pt to stop use of NSAIDs, Fish Oil, and MV 5 days prior to procedure, per Dr Ann protocol.  Tylenol based products are ok to take.  Pt verbalized understanding.      All risks, benefits, alternatives, and indications of colonoscopy procedure have been discussed with the patient. Risks to include perforation of the colon requiring possible surgery or colostomy, risk of bleeding from biopsies or removal of colon tissue, possibility of missing a colon polyp or cancer, or adverse drug reaction.  Benefits to include the diagnosis and management of disease of the colon and rectum. Alternatives to include barium enema, radiographic evaluation, lab testing or no intervention. Pt verbalizes understanding and agrees to proceed with procedure.          Brayan Rocha, APRN  03/30/23          There are no Patient Instructions on file for this visit.

## 2023-04-07 ENCOUNTER — ANESTHESIA EVENT (OUTPATIENT)
Dept: GASTROENTEROLOGY | Facility: HOSPITAL | Age: 83
End: 2023-04-07
Payer: MEDICARE

## 2023-04-07 ENCOUNTER — ANESTHESIA (OUTPATIENT)
Dept: GASTROENTEROLOGY | Facility: HOSPITAL | Age: 83
End: 2023-04-07
Payer: MEDICARE

## 2023-04-07 ENCOUNTER — HOSPITAL ENCOUNTER (OUTPATIENT)
Facility: HOSPITAL | Age: 83
Setting detail: HOSPITAL OUTPATIENT SURGERY
Discharge: HOME | End: 2023-04-07
Attending: INTERNAL MEDICINE | Admitting: INTERNAL MEDICINE
Payer: MEDICARE

## 2023-04-07 VITALS
DIASTOLIC BLOOD PRESSURE: 57 MMHG | TEMPERATURE: 97.2 F | RESPIRATION RATE: 16 BRPM | OXYGEN SATURATION: 91 % | BODY MASS INDEX: 25.74 KG/M2 | WEIGHT: 164 LBS | SYSTOLIC BLOOD PRESSURE: 96 MMHG | HEIGHT: 67 IN | HEART RATE: 68 BPM

## 2023-04-07 DIAGNOSIS — R19.7 DIARRHEA, UNSPECIFIED TYPE: ICD-10-CM

## 2023-04-07 PROCEDURE — 45385 COLONOSCOPY W/LESION REMOVAL: CPT | Performed by: INTERNAL MEDICINE

## 2023-04-07 PROCEDURE — 25010000002 PROPOFOL 1000 MG/100ML EMULSION: Performed by: NURSE ANESTHETIST, CERTIFIED REGISTERED

## 2023-04-07 PROCEDURE — 88305 TISSUE EXAM BY PATHOLOGIST: CPT | Performed by: INTERNAL MEDICINE

## 2023-04-07 PROCEDURE — 45380 COLONOSCOPY AND BIOPSY: CPT | Performed by: INTERNAL MEDICINE

## 2023-04-07 RX ORDER — SODIUM CHLORIDE 9 MG/ML
40 INJECTION, SOLUTION INTRAVENOUS AS NEEDED
Status: CANCELLED | OUTPATIENT
Start: 2023-04-07

## 2023-04-07 RX ORDER — LIDOCAINE HYDROCHLORIDE 20 MG/ML
INJECTION, SOLUTION EPIDURAL; INFILTRATION; INTRACAUDAL; PERINEURAL AS NEEDED
Status: DISCONTINUED | OUTPATIENT
Start: 2023-04-07 | End: 2023-04-07 | Stop reason: SURG

## 2023-04-07 RX ORDER — SODIUM CHLORIDE 9 MG/ML
500 INJECTION, SOLUTION INTRAVENOUS CONTINUOUS PRN
Status: DISCONTINUED | OUTPATIENT
Start: 2023-04-07 | End: 2023-04-07 | Stop reason: HOSPADM

## 2023-04-07 RX ORDER — PROPOFOL 10 MG/ML
INJECTION, EMULSION INTRAVENOUS AS NEEDED
Status: DISCONTINUED | OUTPATIENT
Start: 2023-04-07 | End: 2023-04-07 | Stop reason: SURG

## 2023-04-07 RX ORDER — SODIUM CHLORIDE 9 MG/ML
100 INJECTION, SOLUTION INTRAVENOUS CONTINUOUS
Status: CANCELLED | OUTPATIENT
Start: 2023-04-07

## 2023-04-07 RX ORDER — SODIUM BICARBONATE 325 MG/1
325 TABLET ORAL 2 TIMES DAILY
COMMUNITY

## 2023-04-07 RX ORDER — SODIUM CHLORIDE 0.9 % (FLUSH) 0.9 %
10 SYRINGE (ML) INJECTION AS NEEDED
Status: CANCELLED | OUTPATIENT
Start: 2023-04-07

## 2023-04-07 RX ORDER — LIDOCAINE HYDROCHLORIDE 10 MG/ML
0.5 INJECTION, SOLUTION EPIDURAL; INFILTRATION; INTRACAUDAL; PERINEURAL ONCE AS NEEDED
Status: DISCONTINUED | OUTPATIENT
Start: 2023-04-07 | End: 2023-04-07 | Stop reason: HOSPADM

## 2023-04-07 RX ORDER — SODIUM CHLORIDE 0.9 % (FLUSH) 0.9 %
10 SYRINGE (ML) INJECTION EVERY 12 HOURS SCHEDULED
Status: CANCELLED | OUTPATIENT
Start: 2023-04-07

## 2023-04-07 RX ORDER — SODIUM CHLORIDE 0.9 % (FLUSH) 0.9 %
10 SYRINGE (ML) INJECTION AS NEEDED
Status: DISCONTINUED | OUTPATIENT
Start: 2023-04-07 | End: 2023-04-07 | Stop reason: HOSPADM

## 2023-04-07 RX ADMIN — LIDOCAINE HYDROCHLORIDE 50 MG: 20 INJECTION, SOLUTION EPIDURAL; INFILTRATION; INTRACAUDAL; PERINEURAL at 07:35

## 2023-04-07 RX ADMIN — PROPOFOL 70 MG: 10 INJECTION, EMULSION INTRAVENOUS at 07:35

## 2023-04-07 RX ADMIN — PROPOFOL 50 MG: 10 INJECTION, EMULSION INTRAVENOUS at 07:44

## 2023-04-07 RX ADMIN — PROPOFOL 50 MG: 10 INJECTION, EMULSION INTRAVENOUS at 07:40

## 2023-04-07 RX ADMIN — SODIUM CHLORIDE 500 ML: 9 INJECTION, SOLUTION INTRAVENOUS at 07:23

## 2023-04-07 NOTE — ANESTHESIA POSTPROCEDURE EVALUATION
Patient: Hoang Renteria    Procedure Summary     Date: 04/07/23 Room / Location: [facility] ENDOSCOPY 5 / [facility] ENDOSCOPY    Anesthesia Start: 0732 Anesthesia Stop: 0749    Procedure: COLONOSCOPY WITH ANESTHESIA Diagnosis:       Diarrhea, unspecified type      (Diarrhea, unspecified type [R19.7])    Surgeons: Reddy Ann DO Provider: Adam Reed CRNA    Anesthesia Type: MAC ASA Status: 3          Anesthesia Type: MAC    Vitals  Vitals Value Taken Time   BP     Temp     Pulse 80 04/07/23 0749   Resp     SpO2 89 % 04/07/23 0749   Vitals shown include unvalidated device data.        Post Anesthesia Care and Evaluation    Patient location during evaluation: PHASE II  Patient participation: complete - patient participated  Level of consciousness: awake  Pain score: 0  Pain management: adequate    Airway patency: patent  Anesthetic complications: No anesthetic complications  PONV Status: none  Cardiovascular status: acceptable  Respiratory status: acceptable  Hydration status: acceptable

## 2023-04-10 LAB
CYTO UR: NORMAL
LAB AP CASE REPORT: NORMAL
Lab: NORMAL
PATH REPORT.FINAL DX SPEC: NORMAL
PATH REPORT.GROSS SPEC: NORMAL

## 2023-04-11 ENCOUNTER — TELEPHONE (OUTPATIENT)
Dept: GASTROENTEROLOGY | Facility: CLINIC | Age: 83
End: 2023-04-11
Payer: MEDICARE

## 2023-04-11 NOTE — TELEPHONE ENCOUNTER
----- Message from CITLALLI Barbosa sent at 4/11/2023 11:11 AM CDT -----  I left him a voice mail  Told him to call the office to go over his colon biopsy results    ----- Message -----  From: Lab, Background User  Sent: 4/10/2023   3:58 PM CDT  To: Reddy Ann, DO

## 2023-04-13 ENCOUNTER — TELEPHONE (OUTPATIENT)
Dept: GASTROENTEROLOGY | Facility: CLINIC | Age: 83
End: 2023-04-13
Payer: MEDICARE

## 2023-04-13 RX ORDER — MONTELUKAST SODIUM 4 MG/1
1 TABLET, CHEWABLE ORAL 4 TIMES DAILY
Qty: 60 TABLET | Refills: 11 | Status: SHIPPED | OUTPATIENT
Start: 2023-04-13

## 2023-04-13 NOTE — TELEPHONE ENCOUNTER
Talked to lissette   Told her that he has collagenous colitis  Will send in colestid to take 1x/day

## 2023-05-08 ENCOUNTER — HOSPITAL ENCOUNTER (OUTPATIENT)
Dept: CT IMAGING | Facility: HOSPITAL | Age: 83
Discharge: HOME | End: 2023-05-08
Payer: MEDICARE

## 2023-05-08 DIAGNOSIS — Z87.891 FORMER SMOKER: ICD-10-CM

## 2023-05-08 DIAGNOSIS — Z92.3 HISTORY OF RADIATION THERAPY: ICD-10-CM

## 2023-05-08 DIAGNOSIS — J44.9 STAGE 2 MODERATE COPD BY GOLD CLASSIFICATION: ICD-10-CM

## 2023-05-08 DIAGNOSIS — Z90.2 STATUS POST LOBECTOMY OF LUNG: ICD-10-CM

## 2023-05-08 DIAGNOSIS — C34.92 RECURRENT CARCINOMA OF LEFT LUNG: ICD-10-CM

## 2023-05-08 LAB — CREAT BLDA-MCNC: 1.7 MG/DL (ref 0.6–1.3)

## 2023-05-08 PROCEDURE — 82565 ASSAY OF CREATININE: CPT

## 2023-05-08 PROCEDURE — 25510000001 IOPAMIDOL 61 % SOLUTION

## 2023-05-08 PROCEDURE — 71260 CT THORAX DX C+: CPT

## 2023-05-08 RX ADMIN — IOPAMIDOL 100 ML: 612 INJECTION, SOLUTION INTRAVENOUS at 09:21

## 2023-05-09 ENCOUNTER — HOSPITAL ENCOUNTER (OUTPATIENT)
Dept: RADIATION ONCOLOGY | Facility: HOSPITAL | Age: 83
Setting detail: RADIATION/ONCOLOGY SERIES
End: 2023-05-09
Payer: MEDICARE

## 2023-05-09 NOTE — PROGRESS NOTES
[facility]SL[facility]Lore Bruner MD      Charlie Francis, APRN  ____________________________________________________________  [facility]  Department of Radiation Oncology  [address] [place] [id number]  Office:  [phone]  Fax: [phone]    DATE: 05/10/2023   PATIENT: Hoang Renteria   1940                                 MEDICAL RECORD #: [id number]    Reason for Follow up Visit:  Hoang Renteria is a very pleasant 82 y.o. patient that completed radiation to the lung and returns to the clinic today for routine follow up exam. Reports SOB. Denies activity chnage, fatigue, cough, chest pain, nasuea/vomiitng, diarrhea, light-headedness, weakness, and headaches. He continues to follow .     History of Present Illness  Diagnosed in March 2020 with Adenocarcinoma of lung, SANDRO. Treated with SANDRO wedge resection on 03/31/2020.  · RECURRENCE: 03/16/2021 CT Chest: SANDRO nodularity increased. He completed 5000 cGy in 4 fractions to the SANDRO on 05/07/2021.    06/22/2018 - CT Lung screening (Annual):  • Lung rads category 4A-there are several foci of nodularity within the lung. The largest of the nodules measure 6 mm in size in the the right lower lobe.   • There is also a focus of nodularity within the inferior lingular segment of the left upper lobe which radiographically has the appearance of atelectasis and/or scarring.   • The nodular component does measure approximately 1.5 cm in long axis and I feel would categorize this patient into a 4a category.   • Follow-up low dose CT in 3 months is recommended to assure stability of this particular finding.     09/24/2018 - CT Lung screening (Annual):  • Lung rads category   • Overall stable appearance of the nodules from the previous study of 6/22/2018 except for interval diminishment in size of a focus of atelectasis/scarring within the lingular segment of the left upper lobe.   • No new nodules are present.   • Follow-up  low dose CT imaging in 12 months is recommended.     01/16/2020 - CT Chest with and without contrast:  • Left upper lobe with a 14 x 7 mm pulmonary nodule, previously 6 mm on 9/24/2018.   • Right lower lobe with 7 mm pleural-based pulmonary nodule, previously 7 mm on 9/24/2018.     01/24/2020 - PET Scan:  • Left upper lobe pulmonary nodule, only mildly hypermetabolic with a maximum density of 1.87 SUV, equivocal for malignancy. Follow-up recommended.   • No scintigraphic evidence of hypermetabolic neoplastic disease.     02/04/2020 - Pulmonary Function tests:  • FVC - 87   • FEV1 - 76   • FEF 25-75% - 42   • FEV1/FVC - 67.35   • DLCO - 55   • D/VAsb - 52     02/04/2020 - Appointment with :  • The series of images is reviewed.   • I am very concerned about the progressive growth, despite the equivocal pet result, with risk for lung cancer.   • Refer to CT surgery for eval for wedge +/- lobectomy. FEV1 ok, although DLCO is reduced.I am giving him a sample of anoro and we showed him how to use it. I do not know why epic did not embed that action under plan above, but I can't get the program to do that.   • Recommend smoking abstinence.   • On anticoagulation which will need to be held prior to surgery.    03/31/2020 - Lung, left upper lobe wedge resection - per :  • Moderately differentiated lung adenocarcinoma, margins negative.   • Carcinoma measures 1.3 cm in greatest dimension.   • Negative for evidence of pleural invasion.   • Surgical excision margins are negative for evidence of malignancy.   • Mild subpleural emphysematous changes involving nonneoplastic lung parenchyma.   AJCC STAGE: pT1a, pNx, pMx     05/06/2020 - Appointment with :  • We will plan follow-up in September with repeat chest CT as well as pulmonary function testing then.  We will try to see him for a physical real visit in the office.    • Unable to do physical exam over the phone other than just listening to his  voice.    • His voice is strong and able to complete full sentences and is in good spirits and good humor.    08/31/2020 - CT Chest without contrast:  • Postoperative change of thoracotomy with LEFT upper lobe wedge resection.   • No evidence of residual or metastatic disease.   • There is nodular thickening along the wedge resection line which is likely postoperative scarring but recommend continued imaging surveillance.     09/08/2020 - Appointment with :  • Patient seems to be stable from an oncology standpoint.  Latest CT of the chest at the end of last month at [facility] is negative.  We will continue to follow him with intermittent imaging.  He did have a wedge resection.  COPD is at least moderate based on his latest spirometry.    • He has not responded well to other inhalers in the past.    • I do have some Bevespi samples and will give him a trial of that, 2 puffs twice a day.  Coronavirus avoidance.    • We will follow him up with spirometry in a few months.    • We will get follow-up imaging next year as well.    • We will order that when he comes back in.    03/09/2021 - Appointment with :  • Will plan follow up ct. Pt gets all his films at [facility], so we will order it there and will need to get a disc to review. I told him to make sure we discuss result over phone.   • Discussed potential for data transfer failure between computer systems at the 2 institutions.   • Follow up in 6 months with spirometry. Continue mobilization as much as he can.   • Could benefit from pulm rehab once coronavirus pandemic improves.    03/16/2021 - CT Chest without contrast:  • There is suture material in the left upper lobe with adjacent nodularity. The adjacent soft tissue nodularity has increased insize since the prior exam, measuring 18 x 16 mm in size, previously measuring 10 x 6 mm in size. There is some adjacent parenchymal distortion, presumably related to scarring.   • There is a tiny 3 mm  nodule in the medial left upper lobe, more conspicuous on the current exam (series 3 image 43).  • Several tiny nodules are seen along the left major fissure, presumably tiny intrafissural lymph nodes. There are dependent changes in the lung bases.   • There is mild diffuse bronchial wall thickening.   • There is some atelectasis versus scarring and bronchiectasis in the right middle lobe.   • There is a small pleural-based nodule in the lateral right lower lobe which measures approximately 8 mm in size, previously measuring 10 mm in size.   • Review of the visualized portion of the upper abdomen demonstrates multiple exophytic renal lesions, incompletely characterized on this exam.   • At least one appears hyperdense, suggesting hemorrhagic or proteinaceous cyst.   Impression:  • Soft tissue nodule adjacent to suture material in the left upper lobe is concerning for recurrent disease.   • Multiple renal lesions, incompletely characterized on this exam. Follow-up nonemergent renal ultrasound recommended.   • Atherosclerosis of the aorta and coronary arteries.    03/19/2021 - Appointment with :  • We discussed the findings and reviewed the film Primary and most substantial concern is possible recurrence of lung cancer in patient with prior smoking history.  • Other possibilities include hypertrophic scarring at surgical site, reaction to coronavirus vaccine (doubt, but brought up by pt), granuloma or other benign disease, metastatic lesion from renal lesions (also doubtful).   • Will plan PET scan.   • If abnormal and suggestive of malignancy, and no other sites identified, then he is a candidate for stereotactic radiosurgery.   • If negative or equivocal, then ongoing follow up could be considered as an alternative.   • Tissue diagnosis would likely require robotic bronchoscopy.   • COPD is stable    03/31/2021 - PET Scan:  • An 18 mm nodule in the left upper lobe demonstrates an SUV of 5.9.  • Review of the  mediastinum reveals no hypermetabolic lymphadenopathy.   Impression:  • Solitary left upper lobe pulmonary nodule with abnormal SUV 5.9 allograft   • There are no other regions of abnormal metabolic activity.     04/07/2021 - Appointment with :  • Following this discussion and in consideration of the diagnostic data/evaluation of the patient, I recommended a course of stereotactic radiosurgery to the SANDRO nodule, will simulate treatment fields today, 3D CT with MIPS to begin the planning process, final course pending.    04/27/2021 - 05/07/2021 - Completed Radiation course:  • Received 5000 cGy in 4 fractions to left upper lobe of lung via stereotactic radiosurgery.    05/19/2021 - Appointment with :  • Return in about 3 months (around 8/19/2021).    08/02/2021 - CT Chest:  • Decreased soft tissue component along the left upper lobe suture  line with probable postradiation changes.   • Stable right lower lobe 8 mm pulmonary nodule compared to 3/16/2021.   • Heavily calcified coronary arteries versus stent.     08/09/2021 - Appointment with :  • Your scan looks great, you are in remission!   • Return to Dr. Bruner in 3 months with a CT scan before.     10/27/2021 - CT chest without contrast:  • Increasing left upper and superior segment left lower lobe opacities compared to 8/2/2021 which may represent sequelae of radiation. Superimposed pneumonia considered. Similar postoperative changes of the left upper lung. Stable 8 mm subpleural right lower lobe nodule. No new dominant nodules identified.   • Left coronary artery stents and/or calcifications.     11/10/2021 - Appointment with :  • I have ordered a PET scan for further evaluation of the increased left upper lobe opacities noted on the CT scan. We will continue routine follow-up/surveillance as discussed in 3 months with follow up CT scan before visit and I have instructed him to continue to see the other health care providers as  per their scheduling.    11/12/2021 - PET Scan:  • There is diffuse uptake in the left upper lobe and superior segment left lower lobe that corresponds to infiltrates seen on recent CT. The SUV max is 4.3. Residual tumor in this area cannot be ruled out. Most of the uptake is likely infectious/inflammatory.  • No evidence of any distant metastatic disease.    02/07/2022 - CT Chest with contrast:  • Scarlike infiltrate with focal calcification in the left upper lobe is slightly smaller now and postradiation changes favored.  • No new lung abnormality.    02/10/2022 - Appointment with :  • Follow up in 3 months     03/11/2022 - CT Abdomen/Pelvis with contrast:  • 1.5 cm left lower renal pole enhancing mass is most concerning for renal cell carcinoma.   • Asymmetric soft tissue density in the posterior dome of the bladder wall. Although the bladder isn't very distended, underlying opacity is not excluded.     05/05/2022 - CT Chest with contrast:  • Continued decrease in area of masslike consolidation in the LEFT upper lobe along the wedge resection suture line now measuring 3.6 x 2.7 cm on axial image 46 (previously 4.2 by 3.2 cm).   • Surrounding groundglass is also decreased.   • No change in subpleural 8 mm triangular nodule in the RIGHT lower lobe on image 90.   • No new or enlarging pulmonary nodule.    05/11/2022 - Appointment with :  • Follow up in 4 months with Chest CT    06/13/2022 - Appointment with :  • Follow up in one year     09/29/2022 - CT chest with contrast:  • Stable appearance of the chest from previous study 5/5/2022. No radiographic evidence of localized recurrence or metastatic disease.  • Scarring within the periphery of the left upper lobe with involvement of the adjacent superior segment of the left lower lobe with associated pleural thickening and calcification. This is felt to represent post therapeutic change adjacent to a staple line and is stable from the  previous exam. No new or developing pulmonary nodules are present. No developing mediastinal, hilar or axillary adenopathy.     10/06/2022 - Appointment with :  • Follow up in 4 months     02/03/2023 - CT Chest with contrast:  • No evidence of recurrent or metastatic disease. Stable posttreatment change in the LEFT upper chest.  • There is a single new 4 mm pulmonary nodule in the RIGHT upper lobe. Favor this to be related to an infectious or inflammatory process but recommend follow-up imaging to confirm resolution.    02/08/2023 - Appointment with :  • Return in 3 months with a CT chest before    05/08/2023 - CT Chest with contrast:  • No evidence of recurrent or metastatic disease. Stable post treatment change in the LEFT upper chest.  • No change in the 4 mm RIGHT upper lobe pulmonary nodule that was new on the prior study. No new or enlarging pulmonary nodule      History obtained from  PATIENT, FAMILY and CHART    PAST MEDICAL HISTORY  Past Medical History:   Diagnosis Date   • Cancer     lung   • Hyperlipidemia    • Hypertension    • Shortness of breath 02/04/2020   • Stage 2 moderate COPD by GOLD classification 02/04/2020      PAST SURGICAL HISTORY  Past Surgical History:   Procedure Laterality Date   • CHOLECYSTECTOMY  09/2022   • COLONOSCOPY  03/22/2016    three polyps  all removed   • COLONOSCOPY N/A 05/01/2019    Procedure: COLONOSCOPY WITH ANESTHESIA;  Surgeon: Reddy Ann DO;  Location: [facility] ENDOSCOPY;  Service: Gastroenterology   • COLONOSCOPY N/A 4/7/2023    Procedure: COLONOSCOPY WITH ANESTHESIA;  Surgeon: Reddy Ann DO;  Location: [facility] ENDOSCOPY;  Service: Gastroenterology;  Laterality: N/A;  Pre: Diarrhea, History of adenomatous polyp of colon  Post: polyp  EnricoLaura MD   • ENDOSCOPY N/A 11/16/2022    Procedure: ESOPHAGOGASTRODUODENOSCOPY WITH ANESTHESIA;  Surgeon: Reddy Ann DO;  Location: [facility] ENDOSCOPY;  Service: Gastroenterology;   Laterality: N/A;  Pre: Dysphagia  Post: Duodenitis  Enrico, Laura Brantley MD   • HERNIA REPAIR        FAMILY HISTORY  family history includes Cancer in his mother; Colon polyps in his sister.    SOCIAL HISTORY  Social History     Tobacco Use   • Smoking status: Former     Packs/day: 1.00     Years: 65.00     Pack years: 65.00     Types: Cigarettes     Quit date: 2/10/2020     Years since quitting: 3.2   • Smokeless tobacco: Former   Vaping Use   • Vaping Use: Never used   Substance Use Topics   • Alcohol use: Not Currently   • Drug use: No      Sulfamethoxazole-trimethoprim     MEDICATIONS  Current Outpatient Medications   Medication Sig Dispense Refill   • albuterol sulfate  (90 Base) MCG/ACT inhaler Inhale 2 puffs Every 4 (Four) Hours As Needed for Wheezing.     • allopurinol (ZYLOPRIM) 100 MG tablet Take 1 tablet by mouth Daily.     • amLODIPine (NORVASC) 5 MG tablet Take 5 mg by mouth 2 times daily     • atenolol (TENORMIN) 25 MG tablet Take 25 mg by mouth daily.     • atorvastatin (LIPITOR) 10 MG tablet Take 1 tablet by mouth 3 (Three) Times a Week. mon-wed-fri     • clobetasol (TEMOVATE) 0.05 % cream Apply  topically to the appropriate area as directed 2 (Two) Times a Day.     • Cobalamine Combinations (B-12) 100-5000 MCG sublingual tablet Place  under the tongue.     • colestipol (COLESTID) 1 g tablet Take 1 tablet by mouth 4 (Four) Times a Day. 60 tablet 11   • FeroSul 325 (65 Fe) MG tablet Take 1 tablet by mouth Daily.     • fluticasone (FLONASE) 50 MCG/ACT nasal spray 2 sprays into the nostril(s) as directed by provider Daily.     • gabapentin (NEURONTIN) 300 MG capsule Take 300 mg by mouth 2 times daily.     • leflunomide (ARAVA) 20 MG tablet Take 1 tablet by mouth Daily. Holding for 2 weeks due to diarrhea per Dr. Crandall     • losartan (COZAAR) 25 MG tablet Take 25 mg by mouth     • nitroglycerin (NITROSTAT) 0.4 MG SL tablet Place 0.4 mg under the tongue every 5 minutes as needed.     • Omega  3 1200 MG capsule Take 1 capsule by mouth.     • prasugrel (EFFIENT) 10 MG tablet Take 5 mg by mouth Daily.     • sodium bicarbonate 325 MG tablet Take 1 tablet by mouth 2 (Two) Times a Day. 2 tabs in am, 1 tab in pm     • sodium bicarbonate 650 MG tablet 2 (Two) Times a Day. 2 in am 1 in pm.     • tamsulosin (FLOMAX) 0.4 MG capsule 24 hr capsule Take 0.4 mg by mouth daily.     • vitamin D (ERGOCALCIFEROL) 1.25 MG (42629 UT) capsule capsule Take 1 capsule by mouth Every 30 (Thirty) Days.     • diphenoxylate-atropine (LOMOTIL) 2.5-0.025 MG per tablet Take 1 tablet by mouth 4 (Four) Times a Day As Needed for Diarrhea.     • hydrALAZINE (APRESOLINE) 10 MG tablet Take 10 mg by mouth 2 times daily     • phenylephrine-mineral oil-petrolatum (PREPARATION H) 0.25-14-74.9 % ointment hemorrhoidal ointment Insert  into the rectum 3 (Three) Times a Day As Needed.       No current facility-administered medications for this visit.      Current outpatient and discharge medications have been reconciled for the patient.  Reviewed by: Luis Felipe Bruner III, MD    The following portions of the patient's history were reviewed and updated as appropriate: allergies, current medications, past family history, past medical history, past social history, past surgical history and problem list.     REVIEW OF SYSTEMS  Review of Systems   Constitutional: Negative.  Negative for activity change and fatigue.   HENT: Negative.    Respiratory: Positive for shortness of breath. Negative for cough.    Cardiovascular: Negative.  Negative for chest pain.   Gastrointestinal: Negative.    Genitourinary: Negative.    Musculoskeletal: Negative.    Skin: Negative.    Neurological: Negative.  Negative for dizziness, weakness and headaches.   Hematological: Negative.  Negative for adenopathy.   Psychiatric/Behavioral: Negative.    All other systems reviewed and are negative.      PHYSICAL EXAM  VITAL SIGNS:   Vitals:    05/10/23 0925   BP: 131/67   Pulse: 69  "  SpO2: 92%  Comment: room air   Weight: 77.4 kg (170 lb 11.2 oz)   Height: 170.2 cm (67\")   PainSc: 0-No pain      General Appearance:  awake, alert, oriented, in no acute distress.  Head: Normocephalic  Neck: Supple, no mass, non-tender  Back:  Symmetric, no curvature, ROM normal  Lungs:  Normal expansion.  Clear to auscultation.  No rales, rhonchi, or wheezing.  Heart:  Heart sounds are normal.  Regular rate and rhythm   Abdomen:  Soft, non-tender, normal bowel sounds  Extremities: Warm to touch, pink, with no edema. Pulses 2+ bilaterally  Musculoskeletal: strength and sensation grossly normal  Neurologic:  Alert and oriented, gait normal, non-focal exam  Psych exam: normal situational behavior   Skin:  Warm and moist. No suspicious lesions or rashes of concern    Performance Status: ECOG (0) Fully active, able to carry on all predisease performance without restriction    Clinical Quality Measures  -Pain Documented  Hoang Renteria reports a pain score of 0. Given his pain assessment as noted, treatment options were discussed and the following options were decided upon as a follow-up plan to address the patient's pain: No pain, no plan given.  Pain Medications             gabapentin (NEURONTIN) 300 MG capsule Take 300 mg by mouth 2 times daily.    leflunomide (ARAVA) 20 MG tablet Take 1 tablet by mouth Daily. Holding for 2 weeks due to diarrhea per Dr. Crandall        -Advanced Care Planning Advance Care Planning   ACP discussion was held with the patient during this visit. Patient does not have an advance directive, information provided.    -Body Mass Index Screening and Follow-Up Plan  Patient's Body mass index is 26.74 kg/m². indicating that he is overweight (BMI 25-29.9). Patient's (Body mass index is 26.74 kg/m².) indicates that they are overweight with health conditions that include none .     -Tobacco Use: Screening and Cessation Intervention Social History    Tobacco Use      Smoking status: Former       "  Packs/day: 1.00        Years: 65.00        Pack years: 65        Types: Cigarettes        Quit date: 2/10/2020        Years since quitting: 3.2      Smokeless tobacco: Former    ASSESSMENT AND PLAN  1. Recurrent carcinoma of left lung    2. Status post lobectomy of lung    3. 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 History of radiation therapy    5. Former smoker      Orders Placed This Encounter   Procedures   • CT Chest With Contrast     Standing Status:   Future     Standing Expiration Date:   5/10/2024     Order Specific Question:   Release to patient     Answer:   Routine Release     RECOMMENDATIONS:  Hoang Renteria is status post completion of radiation therapy to the lung and presents to our clinic today for surveillance exam and to review imaging. Diagnosed in March 2020 with Adenocarcinoma of lung, SANDRO 1.3 cm. Underwent SANDRO wedge resection on 03/31/2020.  · RECURRENCE: 03/16/2021 CT Chest revealed SANDRO nodularity increased, 1.8 cm. He completed 5000 cGy in 4 fractions to the SANDRO on 05/07/2021.    CT-scan of the chest on 05/08/2023 revealed no evidence of recurrent or metastatic disease. Stable post treatment change in the LEFT upper chest. No change in the 4 mm RIGHT upper lobe pulmonary nodule that was new on the prior study. No new or enlarging pulmonary nodule    On exam, I do not see evidence for recurrent or metastatic disease at this time. We will continue routine follow-up/surveillance as discussed in 6 months with follow up CT scan prior to next visit. I have instructed him to continue to see the other health care providers as per their scheduling.    Patient Instructions   1) Return in 6 months with a CT chest before     Todays appointment time was spent in counseling, coordination of care and surveillance related to patients diagnosis as well as radiation therapy possible and probable after effects.   Luis Felipe Bruner III, MD  05/10/2023

## 2023-05-10 ENCOUNTER — OFFICE VISIT (OUTPATIENT)
Dept: RADIATION ONCOLOGY | Facility: HOSPITAL | Age: 83
End: 2023-05-10
Payer: MEDICARE

## 2023-05-10 VITALS
SYSTOLIC BLOOD PRESSURE: 131 MMHG | HEART RATE: 69 BPM | HEIGHT: 67 IN | BODY MASS INDEX: 26.79 KG/M2 | DIASTOLIC BLOOD PRESSURE: 67 MMHG | OXYGEN SATURATION: 92 % | WEIGHT: 170.7 LBS

## 2023-05-10 DIAGNOSIS — J44.9 STAGE 2 MODERATE COPD BY GOLD CLASSIFICATION: ICD-10-CM

## 2023-05-10 DIAGNOSIS — Z87.891 FORMER SMOKER: ICD-10-CM

## 2023-05-10 DIAGNOSIS — Z92.3 HISTORY OF RADIATION THERAPY: ICD-10-CM

## 2023-05-10 DIAGNOSIS — Z90.2 STATUS POST LOBECTOMY OF LUNG: ICD-10-CM

## 2023-05-10 DIAGNOSIS — C34.92 RECURRENT CARCINOMA OF LEFT LUNG: Primary | ICD-10-CM

## 2023-05-10 PROCEDURE — G0463 HOSPITAL OUTPT CLINIC VISIT: HCPCS | Performed by: RADIOLOGY

## 2023-06-14 ENCOUNTER — OFFICE VISIT (OUTPATIENT)
Dept: PULMONOLOGY | Facility: CLINIC | Age: 83
End: 2023-06-14
Payer: MEDICARE

## 2023-06-14 VITALS
HEIGHT: 66 IN | HEART RATE: 74 BPM | BODY MASS INDEX: 26.52 KG/M2 | WEIGHT: 165 LBS | DIASTOLIC BLOOD PRESSURE: 80 MMHG | OXYGEN SATURATION: 96 % | SYSTOLIC BLOOD PRESSURE: 132 MMHG

## 2023-06-14 DIAGNOSIS — Z87.891 PERSONAL HISTORY OF NICOTINE DEPENDENCE: ICD-10-CM

## 2023-06-14 DIAGNOSIS — Z85.118 PERSONAL HISTORY OF LUNG CANCER: ICD-10-CM

## 2023-06-14 DIAGNOSIS — J44.9 STAGE 2 MODERATE COPD BY GOLD CLASSIFICATION: Primary | ICD-10-CM

## 2023-06-14 NOTE — PROCEDURES
Pulmonary Function Test  Performed by: Jaylin Villegas, RRT  Authorized by: Camron Borjas MD      Pre Drug % Predicted    FVC: 90%   FEV1: 74%   FEF 25-75%: 44%   FEV1/FVC: 62%    Interpretation   Spirometry   Spirometry shows moderate obstruction. There is reduced midflow suggesting small airway/airflow obstruction.   Overall comments: Stable compared with 2020    .esign

## 2023-06-14 NOTE — PROGRESS NOTES
Background:  Pt wi wedge resection lung ca 2020, postop resp failure with hypoxia, hx RA, CAD CKD   Chief Complaint  COPD    Subjective    History of Present Illness     Hoang Renteria is here for follow up with [facility].  History of Present Illness  He reports no complaints today. He went home from the hospital with oxygen and has quit using oxygen.  Has been checking sat at home with results always over 90   Tobacco Use: Medium Risk   • Smoking Tobacco Use: Former   • Smokeless Tobacco Use: Former   • Passive Exposure: Not on file      Current Outpatient Medications   Medication Instructions   • albuterol sulfate  (90 Base) MCG/ACT inhaler 2 puffs, Inhalation, Every 4 Hours PRN   • allopurinol (ZYLOPRIM) 100 mg, Oral, Daily   • amLODIPine (NORVASC) 5 MG tablet Take 5 mg by mouth 2 times daily   • atenolol (TENORMIN) 25 MG tablet Take 25 mg by mouth daily.   • atorvastatin (LIPITOR) 10 mg, Oral, 3 Times Weekly, mon-wed-fri   • clobetasol (TEMOVATE) 0.05 % cream Topical, 2 Times Daily   • Cobalamine Combinations (B-12) 100-5000 MCG sublingual tablet Sublingual   • colestipol (COLESTID) 1 g, Oral, 4 Times Daily   • FeroSul 325 (65 Fe) MG tablet 1 tablet, Oral, Daily   • fluticasone (FLONASE) 50 MCG/ACT nasal spray 2 sprays, Nasal, Daily   • gabapentin (NEURONTIN) 300 MG capsule Take 300 mg by mouth 2 times daily.   • leflunomide (ARAVA) 20 mg, Oral, Daily, Holding for 2 weeks due to diarrhea per Dr. Crandall   • losartan (COZAAR) 25 MG tablet Take 25 mg by mouth   • nitroglycerin (NITROSTAT) 0.4 MG SL tablet Place 0.4 mg under the tongue every 5 minutes as needed.   • Omega 3 1200 MG capsule 1 capsule, Oral   • prasugrel (EFFIENT) 5 mg, Oral, Daily   • sodium bicarbonate 650 MG tablet 2 (Two) Times a Day. 2 in am 1 in pm.   • sodium bicarbonate 325 mg, Oral, 2 Times Daily, 2 tabs in am, 1 tab in pm   • tamsulosin (FLOMAX) 0.4 MG capsule 24 hr capsule  "Take 0.4 mg by mouth daily.   • vitamin D (ERGOCALCIFEROL) 50,000 Units, Oral, Every 30 Days      Objective     Vital Signs:   /80   Pulse 74   Ht 167.6 cm (66\")   Wt 74.8 kg (165 lb)   SpO2 96% Comment: RA  BMI 26.63 kg/m²   Physical Exam  Constitutional:       Appearance: Normal appearance. He is not ill-appearing or diaphoretic.   Eyes:      Extraocular Movements: Extraocular movements intact.   Pulmonary:      Effort: Pulmonary effort is normal. No respiratory distress.      Breath sounds: No wheezing, rhonchi or rales.   Skin:     Findings: No erythema or rash.   Neurological:      Mental Status: He is alert.      Result Review  Data Reviewed:  CT Chest With Contrast Diagnostic (05/08/2023 09:21)  No change in the 4 mm RIGHT upper lobe pulmonary nodule that was new  on the prior study. No new or enlarging pulmonary nodule  CT ABDOMEN PELVIS W WO CONTRAST Additional Contrast? None (renal mass protocol)    Result Date: 6/7/2023  Impression: Enhancing mass in the left lower renal pole suspicious for neoplasm.  Size currently measures 2.1 cm, previously reported as 1.5 cm. Nephrolithiasis.  No hydronephrosis. Extensive atherosclerosis. Coronary calcifications. Mildly thickened bladder wall, likely associated with chronic outlet obstruction.  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 All CT scans are performed using dose optimization techniques as appropriate to the performed exam and include at least one of the following: Automated exposure control, adjustment of the mA and/or kV according to size, and the use of iterative reconstruction technique. ______________________________________ Electronically signed by: ESTELLA FIORE M.D. Date:     06/07/2023 Time:    11:37       PFT Values        6/14/2023    09:00   Pre Drug PFT Results   FVC 90   FEV1 74   FEF 25-75% 44   FEV1/FVC 62                Assessment and Plan    Diagnoses and all orders for this visit:    1. Stage 2 moderate COPD by GOLD classification " (Primary)  -     Pulmonary Function Test    2. Personal history of lung cancer    3. Personal history of nicotine dependence    he is stable with stable copd, moderate, no evidence of recurrence of lung cancer.  No changes for now.  Call prn for acute problems    Follow Up   Return in about 1 year (around 6/14/2024).  Patient was given instructions and counseling regarding his condition or for health maintenance advice. Please see specific information pulled into the AVS if appropriate.    Electronically signed by Camron Borjas MD, 6/14/2023, 17:31 CDT

## 2023-06-14 NOTE — LETTER
June 14, 2023    Hoang Renteria  [address] [place] [address]          YOB: 1940      To Whom it May Concern/LEGACY:    Please discontinue the oxygen for Hoang Renteria.      Thank you,                 Camron Borjas MD

## 2023-11-07 ENCOUNTER — HOSPITAL ENCOUNTER (OUTPATIENT)
Dept: CT IMAGING | Facility: HOSPITAL | Age: 83
Discharge: HOME | End: 2023-11-07
Payer: MEDICARE

## 2023-11-07 DIAGNOSIS — Z92.3 HISTORY OF RADIATION THERAPY: ICD-10-CM

## 2023-11-07 DIAGNOSIS — Z90.2 STATUS POST LOBECTOMY OF LUNG: ICD-10-CM

## 2023-11-07 DIAGNOSIS — C34.92 RECURRENT CARCINOMA OF LEFT LUNG: ICD-10-CM

## 2023-11-07 DIAGNOSIS — J44.9 STAGE 2 MODERATE COPD BY GOLD CLASSIFICATION: ICD-10-CM

## 2023-11-07 DIAGNOSIS — Z87.891 FORMER SMOKER: ICD-10-CM

## 2023-11-07 LAB — CREAT BLDA-MCNC: 1.6 MG/DL (ref 0.6–1.3)

## 2023-11-07 PROCEDURE — 71260 CT THORAX DX C+: CPT

## 2023-11-07 PROCEDURE — 25510000001 IOPAMIDOL 61 % SOLUTION

## 2023-11-07 PROCEDURE — 82565 ASSAY OF CREATININE: CPT

## 2023-11-07 RX ADMIN — IOPAMIDOL 100 ML: 612 INJECTION, SOLUTION INTRAVENOUS at 08:40

## 2023-11-10 ENCOUNTER — HOSPITAL ENCOUNTER (OUTPATIENT)
Dept: RADIATION ONCOLOGY | Facility: HOSPITAL | Age: 83
Setting detail: RADIATION/ONCOLOGY SERIES
End: 2023-11-10
Payer: MEDICARE

## 2023-11-12 NOTE — PROGRESS NOTES
[facility] Brun[facility] MD      Charlie Francis, APRN  ____________________________________________________________  [facility]  Department of Radiation Oncology  [address] [place] [id number]  Office:  [phone]  Fax: [phone]    DATE: 11/13/2023   PATIENT: Hoang Renteria   1940                                 MEDICAL RECORD #: [id number]    Reason for Follow up Visit:  Hoang Renteria is a very pleasant 83 y.o. patient that completed radiation to the lung and returns to the clinic today for routine follow up exam. Denies activity change, appetite change, unexpected weight change, nasuea/vomiting, diarrhea, light-headedness, weakness, and headaches. He continues to follow .     History of Present Illness  Diagnosed in March 2020 with Adenocarcinoma of lung, SANDRO. Treated with SANDRO wedge resection on 03/31/2020.  RECURRENCE: 03/16/2021 CT Chest: SANDRO nodularity increased. He completed 5000 cGy in 4 fractions to the SANDRO on 05/07/2021.    06/22/2018 - CT Lung screening (Annual):  Lung rads category 4A-there are several foci of nodularity within the lung. The largest of the nodules measure 6 mm in size in the the right lower lobe.   There is also a focus of nodularity within the inferior lingular segment of the left upper lobe which radiographically has the appearance of atelectasis and/or scarring.   The nodular component does measure approximately 1.5 cm in long axis and I feel would categorize this patient into a 4a category.   Follow-up low dose CT in 3 months is recommended to assure stability of this particular finding.     09/24/2018 - CT Lung screening (Annual):  Lung rads category   Overall stable appearance of the nodules from the previous study of 6/22/2018 except for interval diminishment in size of a focus of atelectasis/scarring within the lingular segment of the left upper lobe.   No new nodules are present.   Follow-up low dose CT  imaging in 12 months is recommended.     01/16/2020 - CT Chest with and without contrast:  Left upper lobe with a 14 x 7 mm pulmonary nodule, previously 6 mm on 9/24/2018.   Right lower lobe with 7 mm pleural-based pulmonary nodule, previously 7 mm on 9/24/2018.     01/24/2020 - PET Scan:  Left upper lobe pulmonary nodule, only mildly hypermetabolic with a maximum density of 1.87 SUV, equivocal for malignancy. Follow-up recommended.   No scintigraphic evidence of hypermetabolic neoplastic disease.     02/04/2020 - Pulmonary Function tests:  FVC - 87   FEV1 - 76   FEF 25-75% - 42   FEV1/FVC - 67.35   DLCO - 55   D/VAsb - 52     02/04/2020 - Appointment with :  The series of images is reviewed.   I am very concerned about the progressive growth, despite the equivocal pet result, with risk for lung cancer.   Refer to CT surgery for eval for wedge +/- lobectomy. FEV1 ok, although DLCO is reduced.I am giving him a sample of anoro and we showed him how to use it. I do not know why epic did not embed that action under plan above, but I can't get the program to do that.   Recommend smoking abstinence.   On anticoagulation which will need to be held prior to surgery.    03/31/2020 - Lung, left upper lobe wedge resection - per :  Moderately differentiated lung adenocarcinoma, margins negative.   Carcinoma measures 1.3 cm in greatest dimension.   Negative for evidence of pleural invasion.   Surgical excision margins are negative for evidence of malignancy.   Mild subpleural emphysematous changes involving nonneoplastic lung parenchyma.   AJCC STAGE: pT1a, pNx, pMx     05/06/2020 - Appointment with :  We will plan follow-up in September with repeat chest CT as well as pulmonary function testing then.  We will try to see him for a physical real visit in the office.    Unable to do physical exam over the phone other than just listening to his voice.    His voice is strong and able to complete full  sentences and is in good spirits and good humor.    08/31/2020 - CT Chest without contrast:  Postoperative change of thoracotomy with LEFT upper lobe wedge resection.   No evidence of residual or metastatic disease.   There is nodular thickening along the wedge resection line which is likely postoperative scarring but recommend continued imaging surveillance.     09/08/2020 - Appointment with :  Patient seems to be stable from an oncology standpoint.  Latest CT of the chest at the end of last month at [facility] is negative.  We will continue to follow him with intermittent imaging.  He did have a wedge resection.  COPD is at least moderate based on his latest spirometry.    He has not responded well to other inhalers in the past.    I do have some Bevespi samples and will give him a trial of that, 2 puffs twice a day.  Coronavirus avoidance.    We will follow him up with spirometry in a few months.    We will get follow-up imaging next year as well.    We will order that when he comes back in.    03/09/2021 - Appointment with :  Will plan follow up ct. Pt gets all his films at [facility], so we will order it there and will need to get a disc to review. I told him to make sure we discuss result over phone.   Discussed potential for data transfer failure between computer systems at the 2 institutions.   Follow up in 6 months with spirometry. Continue mobilization as much as he can.   Could benefit from pulm rehab once coronavirus pandemic improves.    03/16/2021 - CT Chest without contrast:  There is suture material in the left upper lobe with adjacent nodularity. The adjacent soft tissue nodularity has increased insize since the prior exam, measuring 18 x 16 mm in size, previously measuring 10 x 6 mm in size. There is some adjacent parenchymal distortion, presumably related to scarring.   There is a tiny 3 mm nodule in the medial left upper lobe, more conspicuous on the current exam (series 3  image 43).  Several tiny nodules are seen along the left major fissure, presumably tiny intrafissural lymph nodes. There are dependent changes in the lung bases.   There is mild diffuse bronchial wall thickening.   There is some atelectasis versus scarring and bronchiectasis in the right middle lobe.   There is a small pleural-based nodule in the lateral right lower lobe which measures approximately 8 mm in size, previously measuring 10 mm in size.   Review of the visualized portion of the upper abdomen demonstrates multiple exophytic renal lesions, incompletely characterized on this exam.   At least one appears hyperdense, suggesting hemorrhagic or proteinaceous cyst.   Impression:  Soft tissue nodule adjacent to suture material in the left upper lobe is concerning for recurrent disease.   Multiple renal lesions, incompletely characterized on this exam. Follow-up nonemergent renal ultrasound recommended.   Atherosclerosis of the aorta and coronary arteries.    03/19/2021 - Appointment with :  We discussed the findings and reviewed the film Primary and most substantial concern is possible recurrence of lung cancer in patient with prior smoking history.  Other possibilities include hypertrophic scarring at surgical site, reaction to coronavirus vaccine (doubt, but brought up by pt), granuloma or other benign disease, metastatic lesion from renal lesions (also doubtful).   Will plan PET scan.   If abnormal and suggestive of malignancy, and no other sites identified, then he is a candidate for stereotactic radiosurgery.   If negative or equivocal, then ongoing follow up could be considered as an alternative.   Tissue diagnosis would likely require robotic bronchoscopy.   COPD is stable    03/31/2021 - PET Scan:  An 18 mm nodule in the left upper lobe demonstrates an SUV of 5.9.  Review of the mediastinum reveals no hypermetabolic lymphadenopathy.   Impression:  Solitary left upper lobe pulmonary nodule with  abnormal SUV 5.9 allograft   There are no other regions of abnormal metabolic activity.     04/07/2021 - Appointment with :  Following this discussion and in consideration of the diagnostic data/evaluation of the patient, I recommended a course of stereotactic radiosurgery to the SANDRO nodule, will simulate treatment fields today, 3D CT with MIPS to begin the planning process, final course pending.    04/27/2021 - 05/07/2021 - Completed Radiation course:  Received 5000 cGy in 4 fractions to left upper lobe of lung via stereotactic radiosurgery.    05/19/2021 - Appointment with :  Return in about 3 months (around 8/19/2021).    08/02/2021 - CT Chest:  Decreased soft tissue component along the left upper lobe suture  line with probable postradiation changes.   Stable right lower lobe 8 mm pulmonary nodule compared to 3/16/2021.   Heavily calcified coronary arteries versus stent.     08/09/2021 - Appointment with :  Your scan looks great, you are in remission!   Return to Dr. Bruner in 3 months with a CT scan before.     10/27/2021 - CT chest without contrast:  Increasing left upper and superior segment left lower lobe opacities compared to 8/2/2021 which may represent sequelae of radiation. Superimposed pneumonia considered. Similar postoperative changes of the left upper lung. Stable 8 mm subpleural right lower lobe nodule. No new dominant nodules identified.   Left coronary artery stents and/or calcifications.     11/10/2021 - Appointment with :  I have ordered a PET scan for further evaluation of the increased left upper lobe opacities noted on the CT scan. We will continue routine follow-up/surveillance as discussed in 3 months with follow up CT scan before visit and I have instructed him to continue to see the other health care providers as per their scheduling.    11/12/2021 - PET Scan:  There is diffuse uptake in the left upper lobe and superior segment left lower lobe that  corresponds to infiltrates seen on recent CT. The SUV max is 4.3. Residual tumor in this area cannot be ruled out. Most of the uptake is likely infectious/inflammatory.  No evidence of any distant metastatic disease.    02/07/2022 - CT Chest with contrast:  Scarlike infiltrate with focal calcification in the left upper lobe is slightly smaller now and postradiation changes favored.  No new lung abnormality.    02/10/2022 - Appointment with :  Follow up in 3 months     03/11/2022 - CT Abdomen/Pelvis with contrast:  1.5 cm left lower renal pole enhancing mass is most concerning for renal cell carcinoma.   Asymmetric soft tissue density in the posterior dome of the bladder wall. Although the bladder isn't very distended, underlying opacity is not excluded.     05/05/2022 - CT Chest with contrast:  Continued decrease in area of masslike consolidation in the LEFT upper lobe along the wedge resection suture line now measuring 3.6 x 2.7 cm on axial image 46 (previously 4.2 by 3.2 cm).   Surrounding groundglass is also decreased.   No change in subpleural 8 mm triangular nodule in the RIGHT lower lobe on image 90.   No new or enlarging pulmonary nodule.    05/11/2022 - Appointment with :  Follow up in 4 months with Chest CT    06/13/2022 - Appointment with :  Follow up in one year     09/29/2022 - CT chest with contrast:  Stable appearance of the chest from previous study 5/5/2022. No radiographic evidence of localized recurrence or metastatic disease.  Scarring within the periphery of the left upper lobe with involvement of the adjacent superior segment of the left lower lobe with associated pleural thickening and calcification. This is felt to represent post therapeutic change adjacent to a staple line and is stable from the previous exam. No new or developing pulmonary nodules are present. No developing mediastinal, hilar or axillary adenopathy.     10/06/2022 - Appointment with  :  Follow up in 4 months     02/03/2023 - CT Chest with contrast:  No evidence of recurrent or metastatic disease. Stable posttreatment change in the LEFT upper chest.  There is a single new 4 mm pulmonary nodule in the RIGHT upper lobe. Favor this to be related to an infectious or inflammatory process but recommend follow-up imaging to confirm resolution.    02/08/2023 - Appointment with :  Return in 3 months with a CT chest before    05/08/2023 - CT Chest with contrast:  No evidence of recurrent or metastatic disease. Stable post treatment change in the LEFT upper chest.  No change in the 4 mm RIGHT upper lobe pulmonary nodule that was new on the prior study. No new or enlarging pulmonary nodule    05/10/2023 - Appointment with :  On exam, I do not see evidence for recurrent or metastatic disease at this time. We will continue routine follow-up/surveillance as discussed in 6 months with follow up CT scan prior to next visit. I have instructed him to continue to see the other health care providers as per their scheduling.  Plan:  Return in 6 months with a CT chest before    06/07/2023 - CT Abdomen/pelvis with contrast:  Enhancing mass in the left lower renal pole suspicious for neoplasm.  Size currently measures 2.1 cm, previously reported as 1.5 cm.   Nephrolithiasis.  No hydronephrosis.   Extensive atherosclerosis. Coronary calcifications.   Mildly thickened bladder wall, likely associated with chronic outlet obstruction.  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     11/07/2023 - CT Chest with contrast:  LEFT kidney with a 1.5 cm lesion which appears to be enhancing. This is concerning for renal cell carcinoma until proven otherwise. There are also 2 indeterminate LEFT renal lesions in the field-of-view. Recommend CT abdomen pelvis without and with contrast (renal mass protocol) for further evaluation.  There are a few scattered tiny pulmonary nodules and groundglass opacities as described above,  which could be infectious/inflammatory. Similar posttreatment changes to the LEFT lung. Recommend follow-up CT chest in 3 months or per oncology.  Heavily calcified coronary arteries versus stent material. 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        History obtained from  PATIENT, FAMILY and CHART    PAST MEDICAL HISTORY  Past Medical History:   Diagnosis Date    Cancer     lung    Hyperlipidemia     Hypertension     Lung cancer 2020    Pneumonia Nov 2022    Rheumatoid arthritis 10 years    Shortness of breath 02/04/2020    Stage 2 moderate COPD by GOLD classification 02/04/2020      PAST SURGICAL HISTORY  Past Surgical History:   Procedure Laterality Date    CHOLECYSTECTOMY  09/2022    COLONOSCOPY  03/22/2016    three polyps  all removed    COLONOSCOPY N/A 05/01/2019    Procedure: COLONOSCOPY WITH ANESTHESIA;  Surgeon: Reddy Ann DO;  Location:  [facility];  Service: Gastroenterology    COLONOSCOPY N/A 04/07/2023    Procedure: COLONOSCOPY WITH ANESTHESIA;  Surgeon: Reddy Ann DO;  Location:  [facility];  Service: Gastroenterology;  Laterality: N/A;  Pre: Diarrhea, History of adenomatous polyp of colon  Post: polyp  Laura Weston MD    ENDOSCOPY N/A 11/16/2022    Procedure: ESOPHAGOGASTRODUODENOSCOPY WITH ANESTHESIA;  Surgeon: Reddy Ann DO;  Location: [facility];  Service: Gastroenterology;  Laterality: N/A;  Pre: Dysphagia  Post: Duodenitis  Laura Weston MD    HERNIA REPAIR      LUNG BIOPSY  2020      FAMILY HISTORY  family history includes Cancer in his mother; Colon polyps in his sister.    SOCIAL HISTORY  Social History     Tobacco Use    Smoking status: Former     Packs/day: 1.00     Years: 65.00     Additional pack years: 0.00     Total pack years: 65.00     Types: Cigarettes     Quit date: 2/10/2020     Years since quitting: 3.7    Smokeless tobacco: Former   Vaping Use    Vaping Use: Never used   Substance Use  Topics    Alcohol use: Not Currently    Drug use: No      Sulfamethoxazole-trimethoprim     MEDICATIONS  Current Outpatient Medications   Medication Sig Dispense Refill    albuterol sulfate  (90 Base) MCG/ACT inhaler Inhale 2 puffs Every 4 (Four) Hours As Needed for Wheezing.      allopurinol (ZYLOPRIM) 100 MG tablet Take 1 tablet by mouth Daily.      amLODIPine (NORVASC) 5 MG tablet Take 1 tablet by mouth Daily.      atenolol (TENORMIN) 25 MG tablet Take 25 mg by mouth daily.      atorvastatin (LIPITOR) 10 MG tablet Take 1 tablet by mouth 3 (Three) Times a Week. mon-wed-fri      clobetasol (TEMOVATE) 0.05 % cream Apply  topically to the appropriate area as directed 2 (Two) Times a Day.      Cobalamine Combinations (B-12) 100-5000 MCG sublingual tablet Place  under the tongue.      colestipol (COLESTID) 1 g tablet Take 1 tablet by mouth 4 (Four) Times a Day. 120 tablet 11    diphenoxylate-atropine (LOMOTIL) 2.5-0.025 MG per tablet Take 1 tablet by mouth 4 (Four) Times a Day As Needed for Diarrhea.      FeroSul 325 (65 Fe) MG tablet Take 1 tablet by mouth Daily.      fluticasone (FLONASE) 50 MCG/ACT nasal spray 2 sprays into the nostril(s) as directed by provider Daily.      gabapentin (NEURONTIN) 300 MG capsule Take 300 mg by mouth 2 times daily.      losartan (COZAAR) 25 MG tablet Take 25 mg by mouth      nitroglycerin (NITROSTAT) 0.4 MG SL tablet Place 0.4 mg under the tongue every 5 minutes as needed.      Omega 3 1200 MG capsule Take 1 capsule by mouth.      prasugrel (EFFIENT) 10 MG tablet Take 0.5 tablets by mouth Daily.      sodium bicarbonate 325 MG tablet Take 1 tablet by mouth 2 (Two) Times a Day. 2 tabs in am, 1 tab in pm      tamsulosin (FLOMAX) 0.4 MG capsule 24 hr capsule Take 0.4 mg by mouth daily.      vitamin D (ERGOCALCIFEROL) 1.25 MG (35610 UT) capsule capsule Take 1 capsule by mouth Every 30 (Thirty) Days.       No current facility-administered medications for this visit.      Current  "outpatient and discharge medications have been reconciled for the patient.  Reviewed by: CITLALLI Smith    The following portions of the patient's history were reviewed and updated as appropriate: allergies, current medications, past family history, past medical history, past social history, past surgical history and problem list.     REVIEW OF SYSTEMS  Review of Systems   Constitutional:  Negative for activity change, fatigue and unexpected weight change.   HENT: Negative.     Respiratory:  Positive for shortness of breath. Negative for cough.    Cardiovascular: Negative.  Negative for chest pain.   Gastrointestinal: Negative.    Genitourinary: Negative.  Negative for decreased urine volume and frequency.   Musculoskeletal: Negative.    Skin: Negative.    Neurological:  Positive for weakness. Negative for dizziness, syncope and headaches.   Hematological: Negative.  Negative for adenopathy.   All other systems reviewed and are negative.    PHYSICAL EXAM  VITAL SIGNS:   Vitals:    11/13/23 0937   BP: 120/58   Weight: 76.2 kg (168 lb)   Height: 170.2 cm (67\")   PainSc: 0-No pain     General Appearance:  awake, alert, oriented, in no acute distress.  Head: Normocephalic  Neck: Supple, no mass, non-tender  Back:  Symmetric, no curvature, ROM normal  Lungs:  Normal expansion.  Clear to auscultation.  No rales, rhonchi, or wheezing.  Heart:  Heart sounds are normal.  Regular rate and rhythm   Abdomen:  Soft, non-tender, normal bowel sounds  Extremities: Warm to touch, pink, with no edema. Pulses 2+ bilaterally  Musculoskeletal: strength and sensation grossly normal  Neurologic:  Alert and oriented, gait normal, non-focal exam  Psych exam: normal situational behavior   Skin:  Warm and moist. No suspicious lesions or rashes of concern    Performance Status: ECOG (0) Fully active, able to carry on all predisease performance without restriction    Clinical Quality Measures  -Pain Documented  Hoang Renteria reports a " pain score of 0. Given his pain assessment as noted, treatment options were discussed and the following options were decided upon as a follow-up plan to address the patient's pain:  No pain, no plan given .  Pain Medications               gabapentin (NEURONTIN) 300 MG capsule Take 300 mg by mouth 2 times daily.          -Advanced Care Planning Advance Care Planning   ACP discussion was held with the patient during this visit. Patient does not have an advance directive, information provided.    -Body Mass Index Screening and Follow-Up Plan  Patient's Body mass index is 26.31 kg/m². indicating that he is overweight (BMI 25-29.9). Patient's (Body mass index is 26.31 kg/m².) indicates that they are overweight with health conditions that include none .     -Tobacco Use: Screening and Cessation Intervention Social History    Tobacco Use      Smoking status: Former        Packs/day: 1.00        Years: 65.00        Additional pack years: 0.00        Total pack years: 65.00        Types: Cigarettes        Quit date: 2/10/2020        Years since quitting: 3.7      Smokeless tobacco: Former    ASSESSMENT AND PLAN  1. Recurrent carcinoma of left lung    2. Status post lobectomy of lung    3. 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 History of radiation therapy    5. Former smoker      No orders of the defined types were placed in this encounter.    RECOMMENDATIONS:  Hoang Renteria is status post completion of radiation therapy to the lung and presents to our clinic today for surveillance exam and to review imaging. Diagnosed in March 2020 with Adenocarcinoma of lung, SANDRO 1.3 cm. Underwent SANDRO wedge resection on 03/31/2020.  RECURRENCE: 03/16/2021 CT Chest revealed SANDRO nodularity increased, 1.8 cm. He completed 5000 cGy in 4 fractions to the SANDRO on 05/07/2021.    CT-scan of the chest on 11/07/2023 revealed LEFT kidney with a 1.5 cm lesion which appears to be enhancing. This is concerning for renal cell carcinoma  until proven otherwise. There are also 2 indeterminate LEFT renal lesions in the field-of-view. Recommend CT abdomen pelvis without and with contrast (renal mass protocol) for further evaluation. There are a few scattered tiny pulmonary nodules and groundglass opacities as described above, which could be infectious/inflammatory. Similar posttreatment changes to the LEFT lung. Recommend follow-up CT chest in 3 months or per oncology. Heavily calcified coronary arteries versus stent material. 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    On exam, I do not see evidence for recurrent or metastatic disease at this time. We will continue routine follow-up/surveillance as discussed in 3 months with follow up CT scan prior to next visit. The left renal lesion is being managed per urology with serial radiographs. I have instructed him to continue to see the other health care providers as per their scheduling.    Patient Instructions   1) Return in 3 months with a CT chest before     Todays appointment time was spent in counseling, coordination of care and surveillance related to patients diagnosis as well as radiation therapy possible and probable after effects.   Charlie Francis, APRN  11/13/2023

## 2023-11-13 ENCOUNTER — OFFICE VISIT (OUTPATIENT)
Dept: RADIATION ONCOLOGY | Facility: HOSPITAL | Age: 83
End: 2023-11-13
Payer: MEDICARE

## 2023-11-13 VITALS
BODY MASS INDEX: 26.37 KG/M2 | DIASTOLIC BLOOD PRESSURE: 58 MMHG | HEIGHT: 67 IN | WEIGHT: 168 LBS | SYSTOLIC BLOOD PRESSURE: 120 MMHG

## 2023-11-13 DIAGNOSIS — J44.9 STAGE 2 MODERATE COPD BY GOLD CLASSIFICATION: ICD-10-CM

## 2023-11-13 DIAGNOSIS — Z87.891 FORMER SMOKER: ICD-10-CM

## 2023-11-13 DIAGNOSIS — Z92.3 HISTORY OF RADIATION THERAPY: ICD-10-CM

## 2023-11-13 DIAGNOSIS — Z90.2 STATUS POST LOBECTOMY OF LUNG: ICD-10-CM

## 2023-11-13 DIAGNOSIS — C34.92 RECURRENT CARCINOMA OF LEFT LUNG: Primary | ICD-10-CM

## 2023-11-13 PROCEDURE — G0463 HOSPITAL OUTPT CLINIC VISIT: HCPCS | Performed by: RADIOLOGY

## 2023-12-08 ENCOUNTER — TRANSCRIBE ORDERS (OUTPATIENT)
Dept: ADMINISTRATIVE | Facility: HOSPITAL | Age: 83
End: 2023-12-08
Payer: MEDICARE

## 2023-12-08 DIAGNOSIS — N28.1 HYPERDENSE RENAL CYST: Primary | ICD-10-CM

## 2024-01-18 DIAGNOSIS — Z87.891 FORMER SMOKER: ICD-10-CM

## 2024-01-18 DIAGNOSIS — J44.9 STAGE 2 MODERATE COPD BY GOLD CLASSIFICATION: ICD-10-CM

## 2024-01-18 DIAGNOSIS — C34.92 RECURRENT CARCINOMA OF LEFT LUNG: Primary | ICD-10-CM

## 2024-01-18 DIAGNOSIS — Z92.3 HISTORY OF RADIATION THERAPY: ICD-10-CM

## 2024-01-18 DIAGNOSIS — Z90.2 STATUS POST LOBECTOMY OF LUNG: ICD-10-CM

## 2024-02-02 ENCOUNTER — HOSPITAL ENCOUNTER (OUTPATIENT)
Dept: CT IMAGING | Facility: HOSPITAL | Age: 84
Discharge: HOME | End: 2024-02-02
Payer: MEDICARE

## 2024-02-02 ENCOUNTER — HOSPITAL ENCOUNTER (OUTPATIENT)
Dept: RADIATION ONCOLOGY | Facility: HOSPITAL | Age: 84
Setting detail: RADIATION/ONCOLOGY SERIES
End: 2024-02-02
Payer: MEDICARE

## 2024-02-02 DIAGNOSIS — C34.92 RECURRENT CARCINOMA OF LEFT LUNG: ICD-10-CM

## 2024-02-02 DIAGNOSIS — Z92.3 HISTORY OF RADIATION THERAPY: ICD-10-CM

## 2024-02-02 DIAGNOSIS — J44.9 STAGE 2 MODERATE COPD BY GOLD CLASSIFICATION: ICD-10-CM

## 2024-02-02 DIAGNOSIS — Z87.891 FORMER SMOKER: ICD-10-CM

## 2024-02-02 DIAGNOSIS — N28.1 HYPERDENSE RENAL CYST: ICD-10-CM

## 2024-02-02 DIAGNOSIS — Z90.2 STATUS POST LOBECTOMY OF LUNG: ICD-10-CM

## 2024-02-02 LAB — CREAT BLDA-MCNC: 1.9 MG/DL (ref 0.6–1.3)

## 2024-02-02 PROCEDURE — 74178 CT ABD&PLV WO CNTR FLWD CNTR: CPT

## 2024-02-02 PROCEDURE — 71260 CT THORAX DX C+: CPT

## 2024-02-02 PROCEDURE — 25510000001 IOPAMIDOL 61 % SOLUTION

## 2024-02-02 PROCEDURE — 82565 ASSAY OF CREATININE: CPT

## 2024-02-02 RX ADMIN — IOPAMIDOL 100 ML: 612 INJECTION, SOLUTION INTRAVENOUS at 08:14

## 2024-02-04 NOTE — PROGRESS NOTES
[facility]CLore Bruner MD      Charlie Francis, APRN  ____________________________________________________________  [facility]  Department of Radiation Oncology  [address] [place] [id number]  Office:  [phone]  Fax: [phone]    DATE: 02/05/2024  PATIENT: Hoang Renteria   1940                                 MEDICAL RECORD #: [id number]    Reason for Follow up Visit:  Hoang Renteria is a very pleasant 83 y.o. patient that completed radiation to the lung and returns to the clinic today for routine follow up exam. Reports SOB. Denies activity change, fatigue, unexpected weight change, cough, chest pain, nasuea/vomiting, diarrhea, light-headedness, weakness, and headaches. He continues to follow .     History of Present Illness  Diagnosed in March 2020 with Adenocarcinoma of lung, SANDRO. Treated with SANDRO wedge resection on 03/31/2020.  RECURRENCE: 03/16/2021 CT Chest: SANDRO nodularity increased. He completed 5000 cGy in 4 fractions to the SANDRO on 05/07/2021.    06/22/2018 - CT Lung screening (Annual):  Lung rads category 4A-there are several foci of nodularity within the lung. The largest of the nodules measure 6 mm in size in the the right lower lobe.   There is also a focus of nodularity within the inferior lingular segment of the left upper lobe which radiographically has the appearance of atelectasis and/or scarring.   The nodular component does measure approximately 1.5 cm in long axis and I feel would categorize this patient into a 4a category.   Follow-up low dose CT in 3 months is recommended to assure stability of this particular finding.     09/24/2018 - CT Lung screening (Annual):  Lung rads category   Overall stable appearance of the nodules from the previous study of 6/22/2018 except for interval diminishment in size of a focus of atelectasis/scarring within the lingular segment of the left upper lobe.   No new nodules are present.    Follow-up low dose CT imaging in 12 months is recommended.     01/16/2020 - CT Chest with and without contrast:  Left upper lobe with a 14 x 7 mm pulmonary nodule, previously 6 mm on 9/24/2018.   Right lower lobe with 7 mm pleural-based pulmonary nodule, previously 7 mm on 9/24/2018.     01/24/2020 - PET Scan:  Left upper lobe pulmonary nodule, only mildly hypermetabolic with a maximum density of 1.87 SUV, equivocal for malignancy. Follow-up recommended.   No scintigraphic evidence of hypermetabolic neoplastic disease.     02/04/2020 - Pulmonary Function tests:  FVC - 87   FEV1 - 76   FEF 25-75% - 42   FEV1/FVC - 67.35   DLCO - 55   D/VAsb - 52     02/04/2020 - Appointment with :  The series of images is reviewed.   I am very concerned about the progressive growth, despite the equivocal pet result, with risk for lung cancer.   Refer to CT surgery for eval for wedge +/- lobectomy. FEV1 ok, although DLCO is reduced.I am giving him a sample of anoro and we showed him how to use it. I do not know why epic did not embed that action under plan above, but I can't get the program to do that.   Recommend smoking abstinence.   On anticoagulation which will need to be held prior to surgery.    03/31/2020 - Lung, left upper lobe wedge resection - per :  Moderately differentiated lung adenocarcinoma, margins negative.   Carcinoma measures 1.3 cm in greatest dimension.   Negative for evidence of pleural invasion.   Surgical excision margins are negative for evidence of malignancy.   Mild subpleural emphysematous changes involving nonneoplastic lung parenchyma.   AJCC STAGE: pT1a, pNx, pMx     05/06/2020 - Appointment with :  We will plan follow-up in September with repeat chest CT as well as pulmonary function testing then.  We will try to see him for a physical real visit in the office.    Unable to do physical exam over the phone other than just listening to his voice.    His voice is strong and  able to complete full sentences and is in good spirits and good humor.    08/31/2020 - CT Chest without contrast:  Postoperative change of thoracotomy with LEFT upper lobe wedge resection.   No evidence of residual or metastatic disease.   There is nodular thickening along the wedge resection line which is likely postoperative scarring but recommend continued imaging surveillance.     09/08/2020 - Appointment with :  Patient seems to be stable from an oncology standpoint.  Latest CT of the chest at the end of last month at [facility] is negative.  We will continue to follow him with intermittent imaging.  He did have a wedge resection.  COPD is at least moderate based on his latest spirometry.    He has not responded well to other inhalers in the past.    I do have some Bevespi samples and will give him a trial of that, 2 puffs twice a day.  Coronavirus avoidance.    We will follow him up with spirometry in a few months.    We will get follow-up imaging next year as well.    We will order that when he comes back in.    03/09/2021 - Appointment with :  Will plan follow up ct. Pt gets all his films at [facility], so we will order it there and will need to get a disc to review. I told him to make sure we discuss result over phone.   Discussed potential for data transfer failure between computer systems at the 2 institutions.   Follow up in 6 months with spirometry. Continue mobilization as much as he can.   Could benefit from pulm rehab once coronavirus pandemic improves.    03/16/2021 - CT Chest without contrast:  There is suture material in the left upper lobe with adjacent nodularity. The adjacent soft tissue nodularity has increased insize since the prior exam, measuring 18 x 16 mm in size, previously measuring 10 x 6 mm in size. There is some adjacent parenchymal distortion, presumably related to scarring.   There is a tiny 3 mm nodule in the medial left upper lobe, more conspicuous on the  current exam (series 3 image 43).  Several tiny nodules are seen along the left major fissure, presumably tiny intrafissural lymph nodes. There are dependent changes in the lung bases.   There is mild diffuse bronchial wall thickening.   There is some atelectasis versus scarring and bronchiectasis in the right middle lobe.   There is a small pleural-based nodule in the lateral right lower lobe which measures approximately 8 mm in size, previously measuring 10 mm in size.   Review of the visualized portion of the upper abdomen demonstrates multiple exophytic renal lesions, incompletely characterized on this exam.   At least one appears hyperdense, suggesting hemorrhagic or proteinaceous cyst.   Impression:  Soft tissue nodule adjacent to suture material in the left upper lobe is concerning for recurrent disease.   Multiple renal lesions, incompletely characterized on this exam. Follow-up nonemergent renal ultrasound recommended.   Atherosclerosis of the aorta and coronary arteries.    03/19/2021 - Appointment with :  We discussed the findings and reviewed the film Primary and most substantial concern is possible recurrence of lung cancer in patient with prior smoking history.  Other possibilities include hypertrophic scarring at surgical site, reaction to coronavirus vaccine (doubt, but brought up by pt), granuloma or other benign disease, metastatic lesion from renal lesions (also doubtful).   Will plan PET scan.   If abnormal and suggestive of malignancy, and no other sites identified, then he is a candidate for stereotactic radiosurgery.   If negative or equivocal, then ongoing follow up could be considered as an alternative.   Tissue diagnosis would likely require robotic bronchoscopy.   COPD is stable    03/31/2021 - PET Scan:  An 18 mm nodule in the left upper lobe demonstrates an SUV of 5.9.  Review of the mediastinum reveals no hypermetabolic lymphadenopathy.   Impression:  Solitary left upper lobe  pulmonary nodule with abnormal SUV 5.9 allograft   There are no other regions of abnormal metabolic activity.     04/07/2021 - Appointment with :  Following this discussion and in consideration of the diagnostic data/evaluation of the patient, I recommended a course of stereotactic radiosurgery to the SANDRO nodule, will simulate treatment fields today, 3D CT with MIPS to begin the planning process, final course pending.    04/27/2021 - 05/07/2021 - Completed Radiation course:  Received 5000 cGy in 4 fractions to left upper lobe of lung via stereotactic radiosurgery.    05/19/2021 - Appointment with :  Return in about 3 months (around 8/19/2021).    08/02/2021 - CT Chest:  Decreased soft tissue component along the left upper lobe suture  line with probable postradiation changes.   Stable right lower lobe 8 mm pulmonary nodule compared to 3/16/2021.   Heavily calcified coronary arteries versus stent.     08/09/2021 - Appointment with :  Your scan looks great, you are in remission!   Return to Dr. Bruner in 3 months with a CT scan before.     10/27/2021 - CT chest without contrast:  Increasing left upper and superior segment left lower lobe opacities compared to 8/2/2021 which may represent sequelae of radiation. Superimposed pneumonia considered. Similar postoperative changes of the left upper lung. Stable 8 mm subpleural right lower lobe nodule. No new dominant nodules identified.   Left coronary artery stents and/or calcifications.     11/10/2021 - Appointment with :  I have ordered a PET scan for further evaluation of the increased left upper lobe opacities noted on the CT scan. We will continue routine follow-up/surveillance as discussed in 3 months with follow up CT scan before visit and I have instructed him to continue to see the other health care providers as per their scheduling.    11/12/2021 - PET Scan:  There is diffuse uptake in the left upper lobe and superior segment  left lower lobe that corresponds to infiltrates seen on recent CT. The SUV max is 4.3. Residual tumor in this area cannot be ruled out. Most of the uptake is likely infectious/inflammatory.  No evidence of any distant metastatic disease.    02/07/2022 - CT Chest with contrast:  Scarlike infiltrate with focal calcification in the left upper lobe is slightly smaller now and postradiation changes favored.  No new lung abnormality.    02/10/2022 - Appointment with :  Follow up in 3 months     03/11/2022 - CT Abdomen/Pelvis with contrast:  1.5 cm left lower renal pole enhancing mass is most concerning for renal cell carcinoma.   Asymmetric soft tissue density in the posterior dome of the bladder wall. Although the bladder isn't very distended, underlying opacity is not excluded.     05/05/2022 - CT Chest with contrast:  Continued decrease in area of masslike consolidation in the LEFT upper lobe along the wedge resection suture line now measuring 3.6 x 2.7 cm on axial image 46 (previously 4.2 by 3.2 cm).   Surrounding groundglass is also decreased.   No change in subpleural 8 mm triangular nodule in the RIGHT lower lobe on image 90.   No new or enlarging pulmonary nodule.    05/11/2022 - Appointment with :  Follow up in 4 months with Chest CT    06/13/2022 - Appointment with :  Follow up in one year     09/29/2022 - CT chest with contrast:  Stable appearance of the chest from previous study 5/5/2022. No radiographic evidence of localized recurrence or metastatic disease.  Scarring within the periphery of the left upper lobe with involvement of the adjacent superior segment of the left lower lobe with associated pleural thickening and calcification. This is felt to represent post therapeutic change adjacent to a staple line and is stable from the previous exam. No new or developing pulmonary nodules are present. No developing mediastinal, hilar or axillary adenopathy.     10/06/2022 - Appointment  with :  Follow up in 4 months     02/03/2023 - CT Chest with contrast:  No evidence of recurrent or metastatic disease. Stable posttreatment change in the LEFT upper chest.  There is a single new 4 mm pulmonary nodule in the RIGHT upper lobe. Favor this to be related to an infectious or inflammatory process but recommend follow-up imaging to confirm resolution.    02/08/2023 - Appointment with :  Return in 3 months with a CT chest before    05/08/2023 - CT Chest with contrast:  No evidence of recurrent or metastatic disease. Stable post treatment change in the LEFT upper chest.  No change in the 4 mm RIGHT upper lobe pulmonary nodule that was new on the prior study. No new or enlarging pulmonary nodule    05/10/2023 - Appointment with :  On exam, I do not see evidence for recurrent or metastatic disease at this time. We will continue routine follow-up/surveillance as discussed in 6 months with follow up CT scan prior to next visit. I have instructed him to continue to see the other health care providers as per their scheduling.  Plan:  Return in 6 months with a CT chest before    06/07/2023 - CT Abdomen/pelvis with contrast:  Enhancing mass in the left lower renal pole suspicious for neoplasm.  Size currently measures 2.1 cm, previously reported as 1.5 cm.   Nephrolithiasis.  No hydronephrosis.   Extensive atherosclerosis. Coronary calcifications.   Mildly thickened bladder wall, likely associated with chronic outlet obstruction.  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     11/07/2023 - CT Chest with contrast:  LEFT kidney with a 1.5 cm lesion which appears to be enhancing. This is concerning for renal cell carcinoma until proven otherwise. There are also 2 indeterminate LEFT renal lesions in the field-of-view. Recommend CT abdomen pelvis without and with contrast (renal mass protocol) for further evaluation.  There are a few scattered tiny pulmonary nodules and groundglass opacities as described  above, which could be infectious/inflammatory. Similar posttreatment changes to the LEFT lung. Recommend follow-up CT chest in 3 months or per oncology.  Heavily calcified coronary arteries versus stent material. 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    11/13/2023 - Appointment with :  Follow up in 3 months     11/22/2023 - Urinary bladder, biopsy:   Benign urothelial mucosa with moderate chronic inflammation and changes consistent with cystitis glandularis.     02/02/2024 - CT chest with contrast:  Stable pleural-based masslike opacity in the left upper lobe is likely posttreatment related.  Several solid nodules bilaterally are stable. There has been interval development of a few groundglass and solid nodules bilaterally as described above. These may be infectious in etiology however continued attention on follow-up is recommended.  Subcentimeter groundglass nodules in the right upper lobe on the prior examination have resolved.    02/02/2024 - CT Abdomen/Pelvis with contrast:  Exophytic mass at the mid pole of the left kidney represents a hyperdense cyst. There are multiple other bilateral renal cysts, vascular calcifications, and probable nonobstructing renal stones.  No acute abnormality identified. There is no CT evidence for metastatic disease.    History obtained from  PATIENT, FAMILY and CHART    PAST MEDICAL HISTORY  Past Medical History:   Diagnosis Date    Cancer     lung    Hyperlipidemia     Hypertension     Lung cancer 2020    Pneumonia Nov 2022    Rheumatoid arthritis 10 years    Shortness of breath 02/04/2020    Stage 2 moderate COPD by GOLD classification 02/04/2020      PAST SURGICAL HISTORY  Past Surgical History:   Procedure Laterality Date    CHOLECYSTECTOMY  09/2022    COLONOSCOPY  03/22/2016    three polyps  all removed    COLONOSCOPY N/A 05/01/2019    Procedure: COLONOSCOPY WITH ANESTHESIA;  Surgeon: Reddy Ann DO;  Location: [facility]  ENDOSCOPY;  Service: Gastroenterology    COLONOSCOPY N/A 04/07/2023    Procedure: COLONOSCOPY WITH ANESTHESIA;  Surgeon: Reddy Ann DO;  Location: [facility] ENDOSCOPY;  Service: Gastroenterology;  Laterality: N/A;  Pre: Diarrhea, History of adenomatous polyp of colon  Post: polyp  Laura Weston MD    ENDOSCOPY N/A 11/16/2022    Procedure: ESOPHAGOGASTRODUODENOSCOPY WITH ANESTHESIA;  Surgeon: Reddy Ann DO;  Location: [facility] ENDOSCOPY;  Service: Gastroenterology;  Laterality: N/A;  Pre: Dysphagia  Post: Duodenitis  Laura Weston MD    HERNIA REPAIR      LUNG BIOPSY  2020      FAMILY HISTORY  family history includes Cancer in his mother; Colon polyps in his sister.    SOCIAL HISTORY  Social History     Tobacco Use    Smoking status: Former     Packs/day: 1.00     Years: 65.00     Additional pack years: 0.00     Total pack years: 65.00     Types: Cigarettes     Quit date: 2/10/2020     Years since quitting: 3.9    Smokeless tobacco: Former   Vaping Use    Vaping Use: Never used   Substance Use Topics    Alcohol use: Not Currently    Drug use: No      Sulfamethoxazole-trimethoprim     MEDICATIONS  Current Outpatient Medications   Medication Sig Dispense Refill    albuterol sulfate  (90 Base) MCG/ACT inhaler Inhale 2 puffs Every 4 (Four) Hours As Needed for Wheezing.      allopurinol (ZYLOPRIM) 100 MG tablet Take 1 tablet by mouth Daily.      amLODIPine (NORVASC) 5 MG tablet Take 1 tablet by mouth Daily.      atenolol (TENORMIN) 25 MG tablet Take 25 mg by mouth daily.      clobetasol (TEMOVATE) 0.05 % cream Apply  topically to the appropriate area as directed 2 (Two) Times a Day.      Cobalamine Combinations (B-12) 100-5000 MCG sublingual tablet Place  under the tongue.      colestipol (COLESTID) 1 g tablet Take 1 tablet by mouth 4 (Four) Times a Day. 120 tablet 11    diphenoxylate-atropine (LOMOTIL) 2.5-0.025 MG per tablet Take 1 tablet by mouth 4 (Four) Times a Day As Needed for  Diarrhea.      FeroSul 325 (65 Fe) MG tablet Take 1 tablet by mouth Daily.      fluticasone (FLONASE) 50 MCG/ACT nasal spray 2 sprays into the nostril(s) as directed by provider Daily.      gabapentin (NEURONTIN) 300 MG capsule Take 300 mg by mouth 2 times daily.      hydroxychloroquine (PLAQUENIL) 200 MG tablet Take 1 tablet by mouth Daily.      losartan (COZAAR) 25 MG tablet Take 25 mg by mouth      nitroglycerin (NITROSTAT) 0.4 MG SL tablet Place 0.4 mg under the tongue every 5 minutes as needed.      Omega 3 1200 MG capsule Take 1 capsule by mouth.      prasugrel (EFFIENT) 10 MG tablet Take 0.5 tablets by mouth Daily.      sodium bicarbonate 325 MG tablet Take 1 tablet by mouth 2 (Two) Times a Day. 2 tabs in am, 1 tab in pm      tamsulosin (FLOMAX) 0.4 MG capsule 24 hr capsule Take 0.4 mg by mouth daily.      vitamin D (ERGOCALCIFEROL) 1.25 MG (57794 UT) capsule capsule Take 1 capsule by mouth Every 30 (Thirty) Days.      atorvastatin (LIPITOR) 10 MG tablet Take 1 tablet by mouth 3 (Three) Times a Week. mon-wed-fri       No current facility-administered medications for this visit.      Current outpatient and discharge medications have been reconciled for the patient.  Reviewed by: Luis Felipe Bruner III, MD    The following portions of the patient's history were reviewed and updated as appropriate: allergies, current medications, past family history, past medical history, past social history, past surgical history and problem list.     REVIEW OF SYSTEMS  Review of Systems   Constitutional: Negative.  Negative for activity change, fatigue and unexpected weight change.   HENT: Negative.     Respiratory:  Positive for shortness of breath. Negative for cough.    Cardiovascular: Negative.  Negative for chest pain.   Gastrointestinal: Negative.    Genitourinary: Negative.    Musculoskeletal: Negative.    Skin: Negative.    Neurological: Negative.  Negative for dizziness, light-headedness and headaches.   Hematological:   "Negative for adenopathy.   Psychiatric/Behavioral: Negative.     All other systems reviewed and are negative.    PHYSICAL EXAM  VITAL SIGNS:   Vitals:    02/05/24 0955   BP: 105/82   Pulse: 62   SpO2: 92%  Comment: room air   Weight: 79.2 kg (174 lb 11.2 oz)   Height: 170.2 cm (67\")   PainSc: 0-No pain     General Appearance:  awake, alert, oriented, in no acute distress.  Head: Normocephalic  Neck: Supple, no mass, non-tender  Back:  Symmetric, no curvature, ROM normal  Lungs:  Normal expansion.  Clear to auscultation.  No rales, rhonchi, or wheezing.  Heart:  Heart sounds are normal.  Regular rate and rhythm   Abdomen:  Soft, non-tender, normal bowel sounds  Extremities: Warm to touch, pink, with no edema. Pulses 2+ bilaterally  Musculoskeletal: strength and sensation grossly normal  Neurologic:  Alert and oriented, gait normal, non-focal exam  Psych exam: normal situational behavior   Skin:  Warm and moist. No suspicious lesions or rashes of concern    Performance Status: ECOG (0) Fully active, able to carry on all predisease performance without restriction    Clinical Quality Measures  -Pain Documented  Hoang Renteria reports a pain score of 0. Given his pain assessment as noted, treatment options were discussed and the following options were decided upon as a follow-up plan to address the patient's pain:  No pain, no plan given .  Pain Medications               gabapentin (NEURONTIN) 300 MG capsule Take 300 mg by mouth 2 times daily.          -Advanced Care Planning Advance Care Planning   ACP discussion was held with the patient during this visit. Patient does not have an advance directive, information provided.    -Body Mass Index Screening and Follow-Up Plan  Patient's Body mass index is 27.36 kg/m². indicating that he is overweight (BMI 25-29.9). Patient's (Body mass index is 27.36 kg/m².) indicates that they are overweight with health conditions that include none .     -Tobacco Use: Screening and " Cessation Intervention Social History    Tobacco Use      Smoking status: Former        Packs/day: 1.00        Years: 65.00        Additional pack years: 0.00        Total pack years: 65.00        Types: Cigarettes        Quit date: 2/10/2020        Years since quitting: 3.9      Smokeless tobacco: Former    ASSESSMENT AND PLAN  1. Recurrent carcinoma of left lung    2. Status post lobectomy of lung    3. 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 History of radiation therapy    5. Former smoker      Orders Placed This Encounter   Procedures    CT Chest With Contrast     Standing Status:   Future     Standing Expiration Date:   2/5/2025     Order Specific Question:   Release to patient     Answer:   Routine Release [[id number]]     RECOMMENDATIONS:  Hoang Renteria is status post completion of radiation therapy to the lung and presents to our clinic today for surveillance exam and to review imaging. Diagnosed in March 2020 with Adenocarcinoma of lung, SANDRO 1.3 cm. Underwent SANDRO wedge resection on 03/31/2020.  RECURRENCE: 03/16/2021 CT Chest revealed SANDRO nodularity increased, 1.8 cm. He completed 5000 cGy in 4 fractions to the SANDRO on 05/07/2021.    CT-scan of the chest on 02/02/2024 revealed a stable pleural-based masslike opacity in the left upper lobe is likely posttreatment related. Several solid nodules bilaterally are stable. There has been interval development of a few groundglass and solid nodules bilaterally as described above. These may be infectious in etiology however continued attention on follow-up is recommended. Subcentimeter groundglass nodules in the right upper lobe on the prior examination have resolved.    CT Abdomen/Pelvis completed on 02/02/2024 revealed a exophytic mass at the mid pole of the left kidney represents a hyperdense cyst. There are multiple other bilateral renal cysts, vascular calcifications, and probable nonobstructing renal stones. No acute abnormality identified. There  is no CT evidence for metastatic disease.    On exam, I do not see evidence for recurrent or metastatic disease at this time. We will continue routine follow-up/surveillance as discussed in 6 months with follow up CT scan prior to next visit. I have instructed him to continue to see the other health care providers as per their scheduling.    Patient Instructions   1) Return in 6 months with a CT chest before     Todays appointment time of 33 minutes was spent in counseling, coordination of care and surveillance related to patients diagnosis as well as radiation therapy possible and probable after effects.   Luis Felipe Bruner III, MD  02/05/2024

## 2024-02-05 ENCOUNTER — OFFICE VISIT (OUTPATIENT)
Dept: RADIATION ONCOLOGY | Facility: HOSPITAL | Age: 84
End: 2024-02-05
Payer: MEDICARE

## 2024-02-05 VITALS
SYSTOLIC BLOOD PRESSURE: 105 MMHG | BODY MASS INDEX: 27.42 KG/M2 | WEIGHT: 174.7 LBS | HEART RATE: 62 BPM | DIASTOLIC BLOOD PRESSURE: 82 MMHG | OXYGEN SATURATION: 92 % | HEIGHT: 67 IN

## 2024-02-05 DIAGNOSIS — J44.9 STAGE 2 MODERATE COPD BY GOLD CLASSIFICATION: ICD-10-CM

## 2024-02-05 DIAGNOSIS — Z90.2 STATUS POST LOBECTOMY OF LUNG: ICD-10-CM

## 2024-02-05 DIAGNOSIS — Z87.891 FORMER SMOKER: ICD-10-CM

## 2024-02-05 DIAGNOSIS — C34.92 RECURRENT CARCINOMA OF LEFT LUNG: Primary | ICD-10-CM

## 2024-02-05 DIAGNOSIS — Z92.3 HISTORY OF RADIATION THERAPY: ICD-10-CM

## 2024-02-05 PROCEDURE — G0463 HOSPITAL OUTPT CLINIC VISIT: HCPCS | Performed by: RADIOLOGY

## 2024-02-05 RX ORDER — HYDROXYCHLOROQUINE SULFATE 200 MG/1
200 TABLET, FILM COATED ORAL DAILY
COMMUNITY
Start: 2024-01-10

## 2024-03-07 ENCOUNTER — TELEPHONE (OUTPATIENT)
Dept: GASTROENTEROLOGY | Facility: CLINIC | Age: 84
End: 2024-03-07
Payer: MEDICARE

## 2024-03-07 NOTE — TELEPHONE ENCOUNTER
PT IS ON THE LIST FOR A 5 YR REPEAT COLONOSCOPY IN MAY 2024. HE DID HAVE A COLONOSCOPY IN APRIL 2023 WITHOUT MENTION OF A RECALL.    DOES THE PT NEED TO STAY O THE MAY 2024 RECALL?

## 2024-05-06 ENCOUNTER — TELEPHONE (OUTPATIENT)
Dept: GASTROENTEROLOGY | Facility: CLINIC | Age: 84
End: 2024-05-06
Payer: MEDICARE

## 2024-05-15 ENCOUNTER — TELEPHONE (OUTPATIENT)
Dept: GASTROENTEROLOGY | Facility: CLINIC | Age: 84
End: 2024-05-15
Payer: MEDICARE

## 2024-05-15 NOTE — TELEPHONE ENCOUNTER
Called patient talked to wife gave her information that I called insurance and got straightened out.

## 2024-06-17 ENCOUNTER — OFFICE VISIT (OUTPATIENT)
Dept: PULMONOLOGY | Facility: CLINIC | Age: 84
End: 2024-06-17
Payer: MEDICARE

## 2024-06-17 VITALS
SYSTOLIC BLOOD PRESSURE: 132 MMHG | OXYGEN SATURATION: 90 % | BODY MASS INDEX: 28.41 KG/M2 | WEIGHT: 181 LBS | HEIGHT: 67 IN | HEART RATE: 54 BPM | DIASTOLIC BLOOD PRESSURE: 80 MMHG

## 2024-06-17 DIAGNOSIS — J44.9 STAGE 2 MODERATE COPD BY GOLD CLASSIFICATION: Primary | ICD-10-CM

## 2024-06-17 PROCEDURE — 99213 OFFICE O/P EST LOW 20 MIN: CPT | Performed by: INTERNAL MEDICINE

## 2024-06-17 NOTE — PROGRESS NOTES
Background:  Pt w lung ca wedge res 2020, local recurrence tx sbrt, gold 2 copd, hx RA, CAD CKD   Chief Complaint  COPD    Subjective    History of Present Illness     Hoang Renteria is here for follow up with [facility].  History of Present Illness  Pt returns for annual follow up following lung cancer surgical and subsequent xrt tx and moderate copd which has not responded well to maintenance inhalers in the past.  He uses albuterol bid.  He had follow up ct planned this summer.       Tobacco Use: Medium Risk (6/17/2024)    Patient History     Smoking Tobacco Use: Former     Smokeless Tobacco Use: Former     Passive Exposure: Not on file      Current Outpatient Medications   Medication Instructions    albuterol sulfate  (90 Base) MCG/ACT inhaler 2 puffs, Inhalation, Every 4 Hours PRN, Patient using 2 puffs twice a day    allopurinol (ZYLOPRIM) 100 mg, Oral, Daily    amLODIPine (NORVASC) 5 MG tablet Take 1 tablet by mouth Daily.    atenolol (TENORMIN) 25 MG tablet Take 25 mg by mouth daily.    atorvastatin (LIPITOR) 10 mg, Oral, 3 Times Weekly, mon-wed-fri    clobetasol (TEMOVATE) 0.05 % cream Topical, 2 Times Daily    Cobalamine Combinations (B-12) 100-5000 MCG sublingual tablet Sublingual    colestipol (COLESTID) 1 g, Oral, 4 Times Daily    fluticasone (FLONASE) 50 MCG/ACT nasal spray 2 sprays, Nasal, Daily    gabapentin (NEURONTIN) 300 MG capsule Take 300 mg by mouth 2 times daily.    hydroxychloroquine (PLAQUENIL) 200 mg, Oral, Daily    losartan (COZAAR) 25 MG tablet Take 25 mg by mouth    nitroglycerin (NITROSTAT) 0.4 MG SL tablet Place 0.4 mg under the tongue every 5 minutes as needed.    Omega 3 1200 MG capsule 1 capsule, Oral    prasugrel (EFFIENT) 5 mg, Oral, Daily    sodium bicarbonate 325 mg, Oral, 2 Times Daily, 2 tabs in am, 1 tab in pm    tamsulosin (FLOMAX) 0.4 MG capsule 24 hr capsule Take 0.4 mg by mouth daily.    vitamin D  "(ERGOCALCIFEROL) 50,000 Units, Oral, Every 30 Days      Objective     Vital Signs:   /80   Pulse 54   Ht 170.2 cm (67\")   Wt 82.1 kg (181 lb)   SpO2 90% Comment: RA  BMI 28.35 kg/m²   Physical Exam  Constitutional:       Appearance: Normal appearance. He is not ill-appearing or diaphoretic.   Eyes:      Extraocular Movements: Extraocular movements intact.   Pulmonary:      Effort: Pulmonary effort is normal. No respiratory distress.      Breath sounds: No wheezing, rhonchi or rales.   Neurological:      Mental Status: He is alert.        Result Review  Data Reviewed:  CT Chest With Contrast Diagnostic (02/02/2024 08:13)    1. Stable pleural-based masslike opacity in the left upper lobe is  likely posttreatment related.  2. Several solid nodules bilaterally are stable. There has been interval  development of a few groundglass and solid nodules bilaterally as  described above. These may be infectious in etiology however continued  attention on follow-up is recommended.  3. Subcentimeter groundglass nodules in the right upper lobe on the  prior examination have resolved.    PFT Values          6/14/2023    09:00   Pre Drug PFT Results   FVC 90   FEV1 74   FEF 25-75% 44   FEV1/FVC 62                Assessment and Plan    Diagnoses and all orders for this visit:    1. Stage 2 moderate COPD by GOLD classification (Primary)        Follow Up   Return in about 6 months (around 12/17/2024).  Patient was given instructions and counseling regarding his condition or for health maintenance advice. Please see specific information pulled into the AVS if appropriate.    Electronically signed by Camron Borjas MD, 6/17/2024, 16:50 CDT    "

## 2024-08-02 ENCOUNTER — HOSPITAL ENCOUNTER (OUTPATIENT)
Dept: RADIATION ONCOLOGY | Facility: HOSPITAL | Age: 84
Setting detail: RADIATION/ONCOLOGY SERIES
End: 2024-08-02
Payer: MEDICARE

## 2024-08-02 NOTE — PROGRESS NOTES
[facility]  Radiation Oncology Clinic   Luis Felipe Kohli MD, FACR  Charliesejal Francis CITLALLI  _______________________________________________  [facility]  Department of Radiation Oncology  [address] [place] [id number]  Office: [phone]  Fax: [phone]    DATE: 08/05/2024  PATIENT: Hoang Renteria  1940                         MEDICAL RECORD #: [id number]    1. Recurrent carcinoma of left lung    2. Status post lobectomy of lung    3. 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 History of radiation therapy    5. Former smoker                                                 REASON FOR VISIT:    No chief complaint on file.    Reason for Follow up Visit:  Hoang Renteria is a very pleasant 84 y.o. patient that completed radiation to the lung and returns to the clinic today for routine follow up exam.    History of Present Illness:  Diagnosed in March 2020 with Adenocarcinoma of the lung, SANDRO, 1.3 cm. Underwent SANDRO wedge resection on 03/31/2020, pathology revealed  margins negative.Negative for PNI.   RECURRENCE: 03/16/2021 CT Chest revealed SANDRO nodularity increased, 1.8 cm (SUV 5.9). Completed 5000 cGy in 4 fractions to the left upper lobe of the lung on 05/07/2021.     06/22/2018 - CT Lung screening (Annual):  Lung rads category 4A-there are several foci of nodularity within the lung. The largest of the nodules measure 6 mm in size in the the right lower lobe.   There is also a focus of nodularity within the inferior lingular segment of the left upper lobe which radiographically has the appearance of atelectasis and/or scarring.   The nodular component does measure approximately 1.5 cm in long axis and I feel would categorize this patient into a 4a category.   Follow-up low dose CT in 3 months is recommended to assure stability of this particular finding.     09/24/2018 - CT Lung screening (Annual):  Lung rads category   Overall stable  appearance of the nodules from the previous study of 6/22/2018 except for interval diminishment in size of a focus of atelectasis/scarring within the lingular segment of the left upper lobe.   No new nodules are present.   Follow-up low dose CT imaging in 12 months is recommended.     01/16/2020 - CT Chest with and without contrast:  Left upper lobe with a 14 x 7 mm pulmonary nodule, previously 6 mm on 9/24/2018.   Right lower lobe with 7 mm pleural-based pulmonary nodule, previously 7 mm on 9/24/2018.   Impression:  Increased size of left upper lobe pulmonary nodule now measuring 14 x 7 mm, previously 6 mm on 9/24/2018. This is concerning for malignancy. Consider PET/CT or tissue sampling for further evaluation.   No lymphadenopathy.   Coronary artery, aortic and branch vessel atherosclerosis.     01/24/2020 - PET Scan:  Left upper lobe pulmonary nodule, only mildly hypermetabolic with a maximum density of 1.87 SUV, equivocal for malignancy. Follow-up recommended.   No scintigraphic evidence of hypermetabolic neoplastic disease.     02/04/2020 - Pulmonary Function tests:  FVC - 87   FEV1 - 76   FEF 25-75% - 42   FEV1/FVC - 67.35   DLCO - 55   D/VAsb - 52     02/04/2020 - Appointment with :  The series of images is reviewed.   I am very concerned about the progressive growth, despite the equivocal pet result, with risk for lung cancer.   Refer to CT surgery for eval for wedge +/- lobectomy. FEV1 ok, although DLCO is reduced.I am giving him a sample of anoro and we showed him how to use it. I do not know why epic did not embed that action under plan above, but I can't get the program to do that.   Recommend smoking abstinence.   On anticoagulation which will need to be held prior to surgery.    03/31/2020 - Lung, left upper lobe wedge resection - per :  Moderately differentiated lung adenocarcinoma, margins negative.   Carcinoma measures 1.3 cm in greatest dimension.   Negative for evidence of  pleural invasion.   Surgical excision margins are negative for evidence of malignancy.   Mild subpleural emphysematous changes involving nonneoplastic lung parenchyma.   AJCC STAGE: pT1a, pNx, pMx     05/06/2020 - Appointment with :  We will plan follow-up in September with repeat chest CT as well as pulmonary function testing then.  We will try to see him for a physical real visit in the office.    Unable to do physical exam over the phone other than just listening to his voice.    His voice is strong and able to complete full sentences and is in good spirits and good humor.    08/31/2020 - CT Chest without contrast:  Postoperative change of thoracotomy with LEFT upper lobe wedge resection.   No evidence of residual or metastatic disease.   There is nodular thickening along the wedge resection line which is likely postoperative scarring but recommend continued imaging surveillance.     09/08/2020 - Appointment with :  Patient seems to be stable from an oncology standpoint.  Latest CT of the chest at the end of last month at [facility] is negative.  We will continue to follow him with intermittent imaging.  He did have a wedge resection.  COPD is at least moderate based on his latest spirometry.    He has not responded well to other inhalers in the past.    I do have some Bevespi samples and will give him a trial of that, 2 puffs twice a day.  Coronavirus avoidance.    We will follow him up with spirometry in a few months.    We will get follow-up imaging next year as well.    We will order that when he comes back in.    03/09/2021 - Appointment with :  Will plan follow up ct. Pt gets all his films at [facility], so we will order it there and will need to get a disc to review. I told him to make sure we discuss result over phone.   Discussed potential for data transfer failure between computer systems at the 2 institutions.   Follow up in 6 months with spirometry. Continue mobilization as  much as he can.   Could benefit from pulm rehab once coronavirus pandemic improves.    03/16/2021 - CT Chest without contrast:  There is suture material in the left upper lobe with adjacent nodularity. The adjacent soft tissue nodularity has increased insize since the prior exam, measuring 18 x 16 mm in size, previously measuring 10 x 6 mm in size. There is some adjacent parenchymal distortion, presumably related to scarring.   There is a tiny 3 mm nodule in the medial left upper lobe, more conspicuous on the current exam (series 3 image 43).  Several tiny nodules are seen along the left major fissure, presumably tiny intrafissural lymph nodes. There are dependent changes in the lung bases.   There is mild diffuse bronchial wall thickening.   There is some atelectasis versus scarring and bronchiectasis in the right middle lobe.   There is a small pleural-based nodule in the lateral right lower lobe which measures approximately 8 mm in size, previously measuring 10 mm in size.   Review of the visualized portion of the upper abdomen demonstrates multiple exophytic renal lesions, incompletely characterized on this exam.   At least one appears hyperdense, suggesting hemorrhagic or proteinaceous cyst.   Impression:  Soft tissue nodule adjacent to suture material in the left upper lobe is concerning for recurrent disease.   Multiple renal lesions, incompletely characterized on this exam. Follow-up nonemergent renal ultrasound recommended.   Atherosclerosis of the aorta and coronary arteries.    03/19/2021 - Appointment with :  We discussed the findings and reviewed the film Primary and most substantial concern is possible recurrence of lung cancer in patient with prior smoking history.  Other possibilities include hypertrophic scarring at surgical site, reaction to coronavirus vaccine (doubt, but brought up by pt), granuloma or other benign disease, metastatic lesion from renal lesions (also doubtful).   Will plan  PET scan.   If abnormal and suggestive of malignancy, and no other sites identified, then he is a candidate for stereotactic radiosurgery.   If negative or equivocal, then ongoing follow up could be considered as an alternative.   Tissue diagnosis would likely require robotic bronchoscopy.   COPD is stable    03/31/2021 - PET Scan:  An 18 mm nodule in the left upper lobe demonstrates an SUV of 5.9.  Review of the mediastinum reveals no hypermetabolic lymphadenopathy.   Impression:  Solitary left upper lobe pulmonary nodule with abnormal SUV 5.9 allograft   There are no other regions of abnormal metabolic activity.     04/07/2021 - Appointment with :  Following this discussion and in consideration of the diagnostic data/evaluation of the patient, I recommended a course of stereotactic radiosurgery to the SANDRO nodule, will simulate treatment fields today, 3D CT with MIPS to begin the planning process, final course pending.  Plan:  Plan on 4-5 radiation treatments over about 2 weeks  Very little in the way of side effects  We will follow with CT scans of thorax every 3 months after treatment    04/27/2021 - 05/07/2021 - Completed Radiation course:  Received 5000 cGy in 4 fractions to left upper lobe of lung via stereotactic radiosurgery.    05/19/2021 - Appointment with :  Return in about 3 months (around 8/19/2021).    08/02/2021 - CT Chest:  Decreased soft tissue component along the left upper lobe suture  line with probable postradiation changes.   Stable right lower lobe 8 mm pulmonary nodule compared to 3/16/2021.   Heavily calcified coronary arteries versus stent.     08/09/2021 - Appointment with :  Your scan looks great, you are in remission!   Return to Dr. Bruner in 3 months with a CT scan before.     10/27/2021 - CT chest without contrast:  Increasing left upper and superior segment left lower lobe opacities compared to 8/2/2021 which may represent sequelae of radiation. Superimposed  pneumonia considered. Similar postoperative changes of the left upper lung. Stable 8 mm subpleural right lower lobe nodule. No new dominant nodules identified.   Left coronary artery stents and/or calcifications.     11/10/2021 - Appointment with :  CT-scan of the chest on 10/26/2021 revealed increasing left upper and superior segment left lower lobe opacities compared to 8/2/2021 which may represent sequelae of radiation. Superimposed pneumonia considered. Similar postoperative changes of the left upper lung. Stable 8 mm subpleural right lower lobe nodule. No new dominant nodules identified. Left coronary artery stents and/or calcifications.   I have ordered a PET scan for further evaluation of the increased left upper lobe opacities noted on the CT scan. We will continue routine follow-up/surveillance as discussed in 3 months with follow up CT scan before visit and I have instructed him to continue to see the other health care providers as per their scheduling.  Plan:  We will order a PET scan, they will call you to schedule.  Otherwise we will see you back in 3 months with a CT chest before.     11/12/2021 - PET Scan:  There is diffuse uptake in the left upper lobe and superior segment left lower lobe that corresponds to infiltrates seen on recent CT. The SUV max is 4.3. Residual tumor in this area cannot be ruled out. Most of the uptake is likely infectious/inflammatory.  No evidence of any distant metastatic disease.    02/07/2022 - CT Chest with contrast:  Scarlike infiltrate with focal calcification in the left upper lobe is slightly smaller now and postradiation changes favored.  No new lung abnormality.    02/10/2022 - Appointment with :  Follow up in 3 months     03/11/2022 - CT Abdomen/Pelvis with contrast:  1.5 cm left lower renal pole enhancing mass is most concerning for renal cell carcinoma.   Asymmetric soft tissue density in the posterior dome of the bladder wall. Although the  bladder isn't very distended, underlying opacity is not excluded.     05/05/2022 - CT Chest with contrast:  Continued decrease in area of masslike consolidation in the LEFT upper lobe along the wedge resection suture line now measuring 3.6 x 2.7 cm on axial image 46 (previously 4.2 by 3.2 cm).   Surrounding groundglass is also decreased.   No change in subpleural 8 mm triangular nodule in the RIGHT lower lobe on image 90.   No new or enlarging pulmonary nodule.  Impression:  Decrease in masslike consolidation in the LEFT upper lobe along the wedge resection suture line. Favor posttreatment change but recommend continued imaging surveillance/follow-up.   No evidence of metastatic disease in the chest.    05/11/2022 - Appointment with :  Follow up in 4 months with Chest CT    06/13/2022 - Appointment with :  Follow up in one year     09/29/2022 - CT chest with contrast:  Stable appearance of the chest from previous study 5/5/2022. No radiographic evidence of localized recurrence or metastatic disease.  Scarring within the periphery of the left upper lobe with involvement of the adjacent superior segment of the left lower lobe with associated pleural thickening and calcification. This is felt to represent post therapeutic change adjacent to a staple line and is stable from the previous exam. No new or developing pulmonary nodules are present. No developing mediastinal, hilar or axillary adenopathy.    10/06/2022 - Appointment with :  Follow up in 4 months     02/03/2023 - CT Chest with contrast:  No evidence of recurrent or metastatic disease. Stable posttreatment change in the LEFT upper chest.  There is a single new 4 mm pulmonary nodule in the RIGHT upper lobe. Favor this to be related to an infectious or inflammatory process but recommend follow-up imaging to confirm resolution.    02/08/2023 - Appointment with :  Return in 3 months with a CT chest before    05/08/2023 - CT Chest  with contrast:  No evidence of recurrent or metastatic disease. Stable post treatment change in the LEFT upper chest.  No change in the 4 mm RIGHT upper lobe pulmonary nodule that was new on the prior study. No new or enlarging pulmonary nodule    05/10/2023 - Appointment with :  On exam, I do not see evidence for recurrent or metastatic disease at this time. We will continue routine follow-up/surveillance as discussed in 6 months with follow up CT scan prior to next visit. I have instructed him to continue to see the other health care providers as per their scheduling.  Plan:  Return in 6 months with a CT chest before    06/07/2023 - CT Abdomen/pelvis with contrast:  Enhancing mass in the left lower renal pole suspicious for neoplasm.  Size currently measures 2.1 cm, previously reported as 1.5 cm.   Nephrolithiasis.  No hydronephrosis.   Extensive atherosclerosis. Coronary calcifications.   Mildly thickened bladder wall, likely associated with chronic outlet obstruction.  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     11/07/2023 - CT Chest with contrast:  LEFT kidney with a 1.5 cm lesion which appears to be enhancing. This is concerning for renal cell carcinoma until proven otherwise. There are also 2 indeterminate LEFT renal lesions in the field-of-view. Recommend CT abdomen pelvis without and with contrast (renal mass protocol) for further evaluation.  There are a few scattered tiny pulmonary nodules and groundglass opacities as described above, which could be infectious/inflammatory. Similar posttreatment changes to the LEFT lung. Recommend follow-up CT chest in 3 months or per oncology.  Heavily calcified coronary arteries versus stent material. 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    11/13/2023 - Appointment with :  Follow up in 3 months     11/22/2023 - Urinary bladder, biopsy:   Benign urothelial mucosa with moderate chronic inflammation and changes consistent with  cystitis glandularis.     02/02/2024 - CT chest with contrast:  Stable pleural-based masslike opacity in the left upper lobe is likely posttreatment related.  Several solid nodules bilaterally are stable. There has been interval development of a few groundglass and solid nodules bilaterally as described above. These may be infectious in etiology however continued attention on follow-up is recommended.  Subcentimeter groundglass nodules in the right upper lobe on the prior examination have resolved.    02/02/2024 - CT Abdomen/Pelvis with contrast:  Exophytic mass at the mid pole of the left kidney represents a hyperdense cyst. There are multiple other bilateral renal cysts, vascular calcifications, and probable nonobstructing renal stones.  No acute abnormality identified. There is no CT evidence for metastatic disease.    02/05/2024 - Appointment with :  On exam, I do not see evidence for recurrent or metastatic disease at this time. We will continue routine follow-up/surveillance as discussed in 6 months with follow up CT scan prior to next visit. I have instructed him to continue to see the other health care providers as per their scheduling.  Plan:  Return in 6 months with a CT chest before     07/15/2024 - Presented to ER with complaints of shortness of breath. Patient reported worsening shortness of air ongoing for the past few weeks. He initially was seen by his PCP and was started on oral steroids. He had been utilizing supplemental oxygen routinely. He became dyspneic at rest presented for further evaluation. Admitted due to acute hypoxemic respiratory failure due to pneumonia, bilateral pleural effusion, and COPD exacerbation.     07/18/2024 - CT Chest without contrast:  Bilateral dependent pleural effusions, moderate on the right and small on the left, with mild pulmonary edema of both lower lungs.   Bilateral pneumonia as described.  Follow-up CT chest 1 month is recommended.   6.1 cm mass-like  consolidation superimposed on suture material in the left upper lobe, probably at least partially due to postradiation therapy fibrosis.  Residual or recurrent mass cannot be excluded.  Correlation with prior studies, if available, might be helpful.  Attention to this finding in the follow-up scan 1 month.   Mild mediastinal and bilateral axillary adenopathy, potentially reactive or secondary to old granulomatous disease.  Metastatic disease cannot be excluded.   Moderate emphysema.     07/20/2024 - Discharged from [facility] [place] operated by [facility] with follow up appointments scheduled.     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 02/04/2020      PAST SURGICAL HISTORY  Past Surgical History:   Procedure Laterality Date    CHOLECYSTECTOMY  09/2022    COLONOSCOPY  03/22/2016    three polyps  all removed    COLONOSCOPY N/A 05/01/2019    Procedure: COLONOSCOPY WITH ANESTHESIA;  Surgeon: Reddy Ann DO;  Location:  [facility];  Service: Gastroenterology    COLONOSCOPY N/A 04/07/2023    Procedure: COLONOSCOPY WITH ANESTHESIA;  Surgeon: Reddy Ann DO;  Location:  [facility];  Service: Gastroenterology;  Laterality: N/A;  Pre: Diarrhea, History of adenomatous polyp of colon  Post: polyp  Laura Weston MD    ENDOSCOPY N/A 11/16/2022    Procedure: ESOPHAGOGASTRODUODENOSCOPY WITH ANESTHESIA;  Surgeon: Reddy Ann DO;  Location: [facility];  Service: Gastroenterology;  Laterality: N/A;  Pre: Dysphagia  Post: Duodenitis  Laura Weston MD    HERNIA REPAIR      LUNG BIOPSY  2020      FAMILY HISTORY  family history includes Cancer in his mother; Colon polyps in his  sister.    SOCIAL HISTORY  Social History     Tobacco Use    Smoking status: Former     Current packs/day: 0.00     Average packs/day: 1 pack/day for 65.0 years (65.0 ttl pk-yrs)     Types: Cigarettes     Start date: 2/10/1955     Quit date: 2/10/2020     Years since quittin.4    Smokeless tobacco: Former   Vaping Use    Vaping status: Never Used   Substance Use Topics    Alcohol use: Not Currently    Drug use: No     ALLERGIES  Ipratropium and Sulfamethoxazole-trimethoprim     MEDICATIONS    Current Outpatient Medications:     albuterol sulfate  (90 Base) MCG/ACT inhaler, Inhale 2 puffs Every 4 (Four) Hours As Needed for Wheezing. Patient using 2 puffs twice a day, Disp: , Rfl:     allopurinol (ZYLOPRIM) 100 MG tablet, Take 1 tablet by mouth Daily., Disp: , Rfl:     amLODIPine (NORVASC) 5 MG tablet, Take 1 tablet by mouth Daily., Disp: , Rfl:     atenolol (TENORMIN) 25 MG tablet, Take 25 mg by mouth daily., Disp: , Rfl:     atorvastatin (LIPITOR) 10 MG tablet, Take 1 tablet by mouth 3 (Three) Times a Week. mon-wed-fri, Disp: , Rfl:     clobetasol (TEMOVATE) 0.05 % cream, Apply  topically to the appropriate area as directed 2 (Two) Times a Day., Disp: , Rfl:     Cobalamine Combinations (B-12) 100-5000 MCG sublingual tablet, Place  under the tongue., Disp: , Rfl:     colestipol (COLESTID) 1 g tablet, Take 1 tablet by mouth 4 (Four) Times a Day., Disp: 120 tablet, Rfl: 11    fluticasone (FLONASE) 50 MCG/ACT nasal spray, 2 sprays into the nostril(s) as directed by provider Daily., Disp: , Rfl:     gabapentin (NEURONTIN) 300 MG capsule, Take 300 mg by mouth 2 times daily., Disp: , Rfl:     hydroxychloroquine (PLAQUENIL) 200 MG tablet, Take 1 tablet by mouth Daily., Disp: , Rfl:     losartan (COZAAR) 25 MG tablet, Take 25 mg by mouth, Disp: , Rfl:     nitroglycerin (NITROSTAT) 0.4 MG SL tablet, Place 0.4 mg under the tongue every 5 minutes as needed., Disp: , Rfl:     Omega 3 1200 MG capsule, Take 1 capsule by  mouth., Disp: , Rfl:     prasugrel (EFFIENT) 10 MG tablet, Take 0.5 tablets by mouth Daily., Disp: , Rfl:     sodium bicarbonate 325 MG tablet, Take 1 tablet by mouth 2 (Two) Times a Day. 2 tabs in am, 1 tab in pm, Disp: , Rfl:     tamsulosin (FLOMAX) 0.4 MG capsule 24 hr capsule, Take 0.4 mg by mouth daily., Disp: , Rfl:     vitamin D (ERGOCALCIFEROL) 1.25 MG (67973 UT) capsule capsule, Take 1 capsule by mouth Every 30 (Thirty) Days., Disp: , Rfl:     Current outpatient and discharge medications have been reconciled for the patient.  Reviewed by: Suad George LPN    The following portions of the patient's history were reviewed and updated as appropriate: allergies, current medications, past family history, past medical history, past social history, past surgical history and problem list.    REVIEW OF SYSTEMS  Review of Systems    PHYSICAL EXAM  VITAL SIGNS: There were no vitals filed for this visit.    Physical Exam     Performance Status: ECOG {[facility] ECOG Status:41276}    Clinical Quality Measures  - Pain Documented by Standardized Tool, FPS Hoang Renteria reports a pain score of .  Given his pain assessment as noted, treatment options were discussed and the following options were decided upon as a follow-up plan to address the patient's pain: {[facility] PAIN ASSESSMENT FOLLOW-UP PLAN:30464}.  Pain Medications               gabapentin (NEURONTIN) 300 MG capsule Take 300 mg by mouth 2 times daily.          - Body Mass Index Screening and Follow-Up Plan  {BMI Follow up (Optional):94687}    - Tobacco Use: Screening and Cessation Intervention  Social History    Tobacco Use      Smoking status: Former        Packs/day: 0.00        Years: 1 pack/day for 65.0 years (65.0 ttl pk-yrs)        Types: Cigarettes        Start date: 2/10/1955        Quit date: 2/10/2020        Years since quittin.4      Smokeless tobacco: Former    - Advanced Care Planning Advance Care Planning   ACP discussion was held with the  patient during this visit. Patient does not have an advance directive, information provided.    - Depression screening - PHQ-9 Total Score:      ASSESSMENT AND PLAN  1. Recurrent carcinoma of left lung    2. Status post lobectomy of lung    3. 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 History of radiation therapy    5. Former smoker      No orders of the defined types were placed in this encounter.    RECOMMENDATIONS: Hoang Renteria is status post completion of radiation therapy to the lung and presents to our clinic today for surveillance exam and to review imaging. Diagnosed in March 2020 with Adenocarcinoma of the lung, SANDRO, 1.3 cm. Underwent SANDRO wedge resection on 03/31/2020, pathology revealed  margins negative.Negative for PNI.   RECURRENCE: 03/16/2021 CT Chest revealed SANDRO nodularity increased, 1.8 cm (SUV 5.9). Completed 5000 cGy in 4 fractions to the left upper lobe of the lung on 05/07/2021.     CT-scan of the chest on 08/05/2024 revealed ***  .     On exam, I do not see evidence for recurrent or metastatic disease at this time. We will continue routine follow-up/surveillance as discussed in *** with follow up CT scan before visit and I have instructed him to continue to see the other health care providers as per their scheduling.    There are no Patient Instructions on file for this visit.    No follow-ups on file.    {Time Spent (Optional):57846}  Suad George LPN  08/05/2024

## 2024-08-05 ENCOUNTER — APPOINTMENT (OUTPATIENT)
Age: 84
End: 2024-08-05
Payer: MEDICARE

## 2024-08-05 DIAGNOSIS — J44.9 STAGE 2 MODERATE COPD BY GOLD CLASSIFICATION: ICD-10-CM

## 2024-08-05 DIAGNOSIS — Z87.891 FORMER SMOKER: ICD-10-CM

## 2024-08-05 DIAGNOSIS — C34.92 RECURRENT CARCINOMA OF LEFT LUNG: Primary | ICD-10-CM

## 2024-08-05 DIAGNOSIS — Z92.3 HISTORY OF RADIATION THERAPY: ICD-10-CM

## 2024-08-05 DIAGNOSIS — Z90.2 STATUS POST LOBECTOMY OF LUNG: ICD-10-CM

## 2024-08-22 NOTE — PROGRESS NOTES
[facility]  Radiation Oncology Clinic   Luis Felipe Kohli MD, FACR  Charliesejal Francis CITLALLI  _______________________________________________  [facility]  Department of Radiation Oncology  [address] [place] [id number]  Office: [phone]  Fax: [phone]    DATE: 08/23/2024  PATIENT: Hoang Renteria  1940                         MEDICAL RECORD #: [id number]    1. Recurrent carcinoma of left lung    2. Status post lobectomy of lung    3. 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 History of radiation therapy    5. Former smoker                                            REASON FOR VISIT:    Chief Complaint   Patient presents with    Lung Cancer     Reason for Follow up Visit:  Hoang Renteria is a very pleasant 84 y.o. patient that completed radiation to the lung and returns to the clinic today for routine follow up exam. Reports SOB. Denies appetite change, unexpected weight change, nausea/vomiting, diarrhea, light-headedness, weakness, and headaches.     History of Present Illness:  Diagnosed in March 2020 with Adenocarcinoma of the lung, SANDRO, 1.3 cm. Underwent SANDRO wedge resection on 03/31/2020, pathology revealed  margins negative.Negative for PNI.   RECURRENCE: 03/16/2021 CT Chest revealed SANDRO nodularity increased, 1.8 cm (SUV 5.9). Completed 5000 cGy in 4 fractions to the left upper lobe of the lung on 05/07/2021.     06/22/2018 - CT Lung screening (Annual):  Lung rads category 4A-there are several foci of nodularity within the lung. The largest of the nodules measure 6 mm in size in the the right lower lobe.   There is also a focus of nodularity within the inferior lingular segment of the left upper lobe which radiographically has the appearance of atelectasis and/or scarring.   The nodular component does measure approximately 1.5 cm in long axis and I feel would categorize this patient into a 4a category.   Follow-up low dose CT in  3 months is recommended to assure stability of this particular finding.     09/24/2018 - CT Lung screening (Annual):  Lung rads category   Overall stable appearance of the nodules from the previous study of 6/22/2018 except for interval diminishment in size of a focus of atelectasis/scarring within the lingular segment of the left upper lobe.   No new nodules are present.   Follow-up low dose CT imaging in 12 months is recommended.     01/16/2020 - CT Chest with and without contrast:  Left upper lobe with a 14 x 7 mm pulmonary nodule, previously 6 mm on 9/24/2018.   Right lower lobe with 7 mm pleural-based pulmonary nodule, previously 7 mm on 9/24/2018.   Impression:  Increased size of left upper lobe pulmonary nodule now measuring 14 x 7 mm, previously 6 mm on 9/24/2018. This is concerning for malignancy. Consider PET/CT or tissue sampling for further evaluation.   No lymphadenopathy.   Coronary artery, aortic and branch vessel atherosclerosis.     01/24/2020 - PET Scan:  Left upper lobe pulmonary nodule, only mildly hypermetabolic with a maximum density of 1.87 SUV, equivocal for malignancy. Follow-up recommended.   No scintigraphic evidence of hypermetabolic neoplastic disease.     02/04/2020 - Pulmonary Function tests:  FVC - 87   FEV1 - 76   FEF 25-75% - 42   FEV1/FVC - 67.35   DLCO - 55   D/VAsb - 52     02/04/2020 - Appointment with :  The series of images is reviewed.   I am very concerned about the progressive growth, despite the equivocal pet result, with risk for lung cancer.   Refer to CT surgery for eval for wedge +/- lobectomy. FEV1 ok, although DLCO is reduced.I am giving him a sample of anoro and we showed him how to use it. I do not know why epic did not embed that action under plan above, but I can't get the program to do that.   Recommend smoking abstinence.   On anticoagulation which will need to be held prior to surgery.    03/31/2020 - Lung, left upper lobe wedge resection - per  :  Moderately differentiated lung adenocarcinoma, margins negative.   Carcinoma measures 1.3 cm in greatest dimension.   Negative for evidence of pleural invasion.   Surgical excision margins are negative for evidence of malignancy.   Mild subpleural emphysematous changes involving nonneoplastic lung parenchyma.   AJCC STAGE: pT1a, pNx, pMx     05/06/2020 - Appointment with :  We will plan follow-up in September with repeat chest CT as well as pulmonary function testing then.  We will try to see him for a physical real visit in the office.    Unable to do physical exam over the phone other than just listening to his voice.    His voice is strong and able to complete full sentences and is in good spirits and good humor.    08/31/2020 - CT Chest without contrast:  Postoperative change of thoracotomy with LEFT upper lobe wedge resection.   No evidence of residual or metastatic disease.   There is nodular thickening along the wedge resection line which is likely postoperative scarring but recommend continued imaging surveillance.     09/08/2020 - Appointment with :  Patient seems to be stable from an oncology standpoint.  Latest CT of the chest at the end of last month at [facility] is negative.  We will continue to follow him with intermittent imaging.  He did have a wedge resection.  COPD is at least moderate based on his latest spirometry.    He has not responded well to other inhalers in the past.    I do have some Bevespi samples and will give him a trial of that, 2 puffs twice a day.  Coronavirus avoidance.    We will follow him up with spirometry in a few months.    We will get follow-up imaging next year as well.    We will order that when he comes back in.    03/09/2021 - Appointment with :  Will plan follow up ct. Pt gets all his films at [facility], so we will order it there and will need to get a disc to review. I told him to make sure we discuss result over phone.    Discussed potential for data transfer failure between computer systems at the 2 institutions.   Follow up in 6 months with spirometry. Continue mobilization as much as he can.   Could benefit from pulm rehab once coronavirus pandemic improves.    03/16/2021 - CT Chest without contrast:  There is suture material in the left upper lobe with adjacent nodularity. The adjacent soft tissue nodularity has increased insize since the prior exam, measuring 18 x 16 mm in size, previously measuring 10 x 6 mm in size. There is some adjacent parenchymal distortion, presumably related to scarring.   There is a tiny 3 mm nodule in the medial left upper lobe, more conspicuous on the current exam (series 3 image 43).  Several tiny nodules are seen along the left major fissure, presumably tiny intrafissural lymph nodes. There are dependent changes in the lung bases.   There is mild diffuse bronchial wall thickening.   There is some atelectasis versus scarring and bronchiectasis in the right middle lobe.   There is a small pleural-based nodule in the lateral right lower lobe which measures approximately 8 mm in size, previously measuring 10 mm in size.   Review of the visualized portion of the upper abdomen demonstrates multiple exophytic renal lesions, incompletely characterized on this exam.   At least one appears hyperdense, suggesting hemorrhagic or proteinaceous cyst.   Impression:  Soft tissue nodule adjacent to suture material in the left upper lobe is concerning for recurrent disease.   Multiple renal lesions, incompletely characterized on this exam. Follow-up nonemergent renal ultrasound recommended.   Atherosclerosis of the aorta and coronary arteries.    03/19/2021 - Appointment with :  We discussed the findings and reviewed the film Primary and most substantial concern is possible recurrence of lung cancer in patient with prior smoking history.  Other possibilities include hypertrophic scarring at surgical site,  reaction to coronavirus vaccine (doubt, but brought up by pt), granuloma or other benign disease, metastatic lesion from renal lesions (also doubtful).   Will plan PET scan.   If abnormal and suggestive of malignancy, and no other sites identified, then he is a candidate for stereotactic radiosurgery.   If negative or equivocal, then ongoing follow up could be considered as an alternative.   Tissue diagnosis would likely require robotic bronchoscopy.   COPD is stable    03/31/2021 - PET Scan:  An 18 mm nodule in the left upper lobe demonstrates an SUV of 5.9.  Review of the mediastinum reveals no hypermetabolic lymphadenopathy.   Impression:  Solitary left upper lobe pulmonary nodule with abnormal SUV 5.9 allograft   There are no other regions of abnormal metabolic activity.     04/07/2021 - Appointment with :  Following this discussion and in consideration of the diagnostic data/evaluation of the patient, I recommended a course of stereotactic radiosurgery to the SANDRO nodule, will simulate treatment fields today, 3D CT with MIPS to begin the planning process, final course pending.    04/27/2021 - 05/07/2021 - Completed Radiation course:  Received 5000 cGy in 4 fractions to left upper lobe of lung via stereotactic radiosurgery.    05/19/2021 - Appointment with :  Return in about 3 months (around 8/19/2021).    08/02/2021 - CT Chest:  Decreased soft tissue component along the left upper lobe suture  line with probable postradiation changes.   Stable right lower lobe 8 mm pulmonary nodule compared to 3/16/2021.   Heavily calcified coronary arteries versus stent.     08/09/2021 - Appointment with :  Your scan looks great, you are in remission!   Return to Dr. Bruner in 3 months with a CT scan before.     10/27/2021 - CT chest without contrast:  Increasing left upper and superior segment left lower lobe opacities compared to 8/2/2021 which may represent sequelae of radiation. Superimposed  pneumonia considered. Similar postoperative changes of the left upper lung. Stable 8 mm subpleural right lower lobe nodule. No new dominant nodules identified.   Left coronary artery stents and/or calcifications.     11/10/2021 - Appointment with :  CT-scan of the chest on 10/26/2021 revealed increasing left upper and superior segment left lower lobe opacities compared to 8/2/2021 which may represent sequelae of radiation. Superimposed pneumonia considered. Similar postoperative changes of the left upper lung. Stable 8 mm subpleural right lower lobe nodule. No new dominant nodules identified. Left coronary artery stents and/or calcifications.   I have ordered a PET scan for further evaluation of the increased left upper lobe opacities noted on the CT scan. We will continue routine follow-up/surveillance as discussed in 3 months with follow up CT scan before visit and I have instructed him to continue to see the other health care providers as per their scheduling.    11/12/2021 - PET Scan:  There is diffuse uptake in the left upper lobe and superior segment left lower lobe that corresponds to infiltrates seen on recent CT. The SUV max is 4.3. Residual tumor in this area cannot be ruled out. Most of the uptake is likely infectious/inflammatory.  No evidence of any distant metastatic disease.    02/07/2022 - CT Chest with contrast:  Scarlike infiltrate with focal calcification in the left upper lobe is slightly smaller now and postradiation changes favored.  No new lung abnormality.    02/10/2022 - Appointment with :  Follow up in 3 months     03/11/2022 - CT Abdomen/Pelvis with contrast:  1.5 cm left lower renal pole enhancing mass is most concerning for renal cell carcinoma.   Asymmetric soft tissue density in the posterior dome of the bladder wall. Although the bladder isn't very distended, underlying opacity is not excluded.     05/05/2022 - CT Chest with contrast:  Continued decrease in area of  masslike consolidation in the LEFT upper lobe along the wedge resection suture line now measuring 3.6 x 2.7 cm on axial image 46 (previously 4.2 by 3.2 cm).   Surrounding groundglass is also decreased.   No change in subpleural 8 mm triangular nodule in the RIGHT lower lobe on image 90.   No new or enlarging pulmonary nodule.    05/11/2022 - Appointment with :  Follow up in 4 months with Chest CT    06/13/2022 - Appointment with :  Follow up in one year     09/29/2022 - CT chest with contrast:  Stable appearance of the chest from previous study 5/5/2022. No radiographic evidence of localized recurrence or metastatic disease.  Scarring within the periphery of the left upper lobe with involvement of the adjacent superior segment of the left lower lobe with associated pleural thickening and calcification. This is felt to represent post therapeutic change adjacent to a staple line and is stable from the previous exam. No new or developing pulmonary nodules are present. No developing mediastinal, hilar or axillary adenopathy.    10/06/2022 - Appointment with :  Follow up in 4 months     02/03/2023 - CT Chest with contrast:  No evidence of recurrent or metastatic disease. Stable posttreatment change in the LEFT upper chest.  There is a single new 4 mm pulmonary nodule in the RIGHT upper lobe. Favor this to be related to an infectious or inflammatory process but recommend follow-up imaging to confirm resolution.    02/08/2023 - Appointment with :  Return in 3 months with a CT chest before    05/08/2023 - CT Chest with contrast:  No evidence of recurrent or metastatic disease. Stable post treatment change in the LEFT upper chest.  No change in the 4 mm RIGHT upper lobe pulmonary nodule that was new on the prior study. No new or enlarging pulmonary nodule    05/10/2023 - Appointment with :    Return in 6 months with a CT chest before    06/07/2023 - CT Abdomen/pelvis with  contrast:  Enhancing mass in the left lower renal pole suspicious for neoplasm.  Size currently measures 2.1 cm, previously reported as 1.5 cm.   Nephrolithiasis.  No hydronephrosis.   Extensive atherosclerosis. Coronary calcifications.   Mildly thickened bladder wall, likely associated with chronic outlet obstruction.  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     11/07/2023 - CT Chest with contrast:  LEFT kidney with a 1.5 cm lesion which appears to be enhancing. This is concerning for renal cell carcinoma until proven otherwise. There are also 2 indeterminate LEFT renal lesions in the field-of-view. Recommend CT abdomen pelvis without and with contrast (renal mass protocol) for further evaluation.  There are a few scattered tiny pulmonary nodules and groundglass opacities as described above, which could be infectious/inflammatory. Similar posttreatment changes to the LEFT lung. Recommend follow-up CT chest in 3 months or per oncology.  Heavily calcified coronary arteries versus stent material. 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    11/13/2023 - Appointment with :  Follow up in 3 months     11/22/2023 - Urinary bladder, biopsy:   Benign urothelial mucosa with moderate chronic inflammation and changes consistent with cystitis glandularis.     02/02/2024 - CT chest with contrast:  Stable pleural-based masslike opacity in the left upper lobe is likely posttreatment related.  Several solid nodules bilaterally are stable. There has been interval development of a few groundglass and solid nodules bilaterally as described above. These may be infectious in etiology however continued attention on follow-up is recommended.  Subcentimeter groundglass nodules in the right upper lobe on the prior examination have resolved.    02/02/2024 - CT Abdomen/Pelvis with contrast:  Exophytic mass at the mid pole of the left kidney represents a hyperdense cyst. There are multiple other bilateral renal  cysts, vascular calcifications, and probable nonobstructing renal stones.  No acute abnormality identified. There is no CT evidence for metastatic disease.    02/05/2024 - Appointment with :  On exam, I do not see evidence for recurrent or metastatic disease at this time. We will continue routine follow-up/surveillance as discussed in 6 months with follow up CT scan prior to next visit. I have instructed him to continue to see the other health care providers as per their scheduling.  Plan:  Return in 6 months with a CT chest before     07/15/2024 - Presented to ER with complaints of shortness of breath. Patient reported worsening shortness of air ongoing for the past few weeks. He initially was seen by his PCP and was started on oral steroids. He had been utilizing supplemental oxygen routinely. He became dyspneic at rest presented for further evaluation. Admitted due to acute hypoxemic respiratory failure due to pneumonia, bilateral pleural effusion, and COPD exacerbation.     07/16/2024 - CT Chest without contrast:  Bilateral dependent pleural effusions, moderate on the right and small on the left, with mild pulmonary edema of both lower lungs.   Bilateral pneumonia as described.  Follow-up CT chest 1 month is recommended.   6.1 cm mass-like consolidation superimposed on suture material in the left upper lobe, probably at least partially due to postradiation therapy fibrosis.  Residual or recurrent mass cannot be excluded.  Correlation with prior studies, if available, might be helpful.  Attention to this finding in the follow-up scan 1 month.   Mild mediastinal and bilateral axillary adenopathy, potentially reactive or secondary to old granulomatous disease.  Metastatic disease cannot be excluded.   Moderate emphysema.     07/20/2024 - Discharged from [facility] with follow up appointments scheduled.     08/23/2024 - CT chest with contrast:  New findings worrisome for progression of disease, with several  new nodules in the lungs, the largest is seen in the left lower lobe and measures 1.3 cm PET/CT may be helpful for follow-up.  No intrathoracic lymphadenopathy is identified.  Stable pleural parenchymal fibrosis in the left upper hemithorax probably related to previous radiation therapy.    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 2020      PAST SURGICAL HISTORY  Past Surgical History:   Procedure Laterality Date    CHOLECYSTECTOMY  2022    COLONOSCOPY  2016    three polyps  all removed    COLONOSCOPY N/A 2019    Procedure: COLONOSCOPY WITH ANESTHESIA;  Surgeon: Reddy Ann DO;  Location:  [facility];  Service: Gastroenterology    COLONOSCOPY N/A 2023    Procedure: COLONOSCOPY WITH ANESTHESIA;  Surgeon: Reddy Ann DO;  Location:  [facility];  Service: Gastroenterology;  Laterality: N/A;  Pre: Diarrhea, History of adenomatous polyp of colon  Post: polyp  Laura Weston MD    ENDOSCOPY N/A 2022    Procedure: ESOPHAGOGASTRODUODENOSCOPY WITH ANESTHESIA;  Surgeon: Reddy Ann DO;  Location: [facility];  Service: Gastroenterology;  Laterality: N/A;  Pre: Dysphagia  Post: Duodenitis  Laura Weston MD    HERNIA REPAIR      LUNG BIOPSY        FAMILY HISTORY  family history includes Cancer in his mother; Colon polyps in his sister.    SOCIAL HISTORY  Social History     Tobacco Use    Smoking status: Former     Current packs/day: 0.00     Average packs/day: 1 pack/day for 65.0 years (65.0 ttl pk-yrs)     Types: Cigarettes     Start date: 2/10/1955     Quit date: 2/10/2020     Years since quittin.5    Smokeless tobacco: Former   Vaping Use    Vaping status:  Never Used   Substance Use Topics    Alcohol use: Not Currently    Drug use: No     ALLERGIES  Ipratropium and Sulfamethoxazole-trimethoprim     MEDICATIONS    Current Outpatient Medications:     albuterol sulfate  (90 Base) MCG/ACT inhaler, Inhale 2 puffs Every 4 (Four) Hours As Needed for Wheezing. Patient using 2 puffs twice a day, Disp: , Rfl:     allopurinol (ZYLOPRIM) 100 MG tablet, Take 1 tablet by mouth Daily., Disp: , Rfl:     atorvastatin (LIPITOR) 10 MG tablet, Take 1 tablet by mouth 3 (Three) Times a Week. mon-wed-fri, Disp: , Rfl:     clobetasol (TEMOVATE) 0.05 % cream, Apply  topically to the appropriate area as directed 2 (Two) Times a Day., Disp: , Rfl:     clopidogrel (PLAVIX) 75 MG tablet, Take 1 tablet by mouth Daily., Disp: , Rfl:     Cobalamine Combinations (B-12) 100-5000 MCG sublingual tablet, Place  under the tongue., Disp: , Rfl:     colestipol (COLESTID) 1 g tablet, Take 1 tablet by mouth 4 (Four) Times a Day., Disp: 120 tablet, Rfl: 11    dilTIAZem CD (CARDIZEM CD) 120 MG 24 hr capsule, Take 1 capsule by mouth Daily., Disp: , Rfl:     Eliquis 5 MG tablet tablet, Take 1 tablet by mouth 2 (Two) Times a Day., Disp: , Rfl:     fluticasone (FLONASE) 50 MCG/ACT nasal spray, 2 sprays into the nostril(s) as directed by provider Daily., Disp: , Rfl:     furosemide (LASIX) 40 MG tablet, Take 1 tablet by mouth Daily., Disp: , Rfl:     gabapentin (NEURONTIN) 300 MG capsule, Take 300 mg by mouth 2 times daily., Disp: , Rfl:     hydroxychloroquine (PLAQUENIL) 200 MG tablet, Take 1 tablet by mouth Daily., Disp: , Rfl:     losartan (COZAAR) 25 MG tablet, Take 25 mg by mouth, Disp: , Rfl:     nitroglycerin (NITROSTAT) 0.4 MG SL tablet, Place 0.4 mg under the tongue every 5 minutes as needed., Disp: , Rfl:     Omega 3 1200 MG capsule, Take 1 capsule by mouth., Disp: , Rfl:     sodium bicarbonate 325 MG tablet, Take 1 tablet by mouth 2 (Two) Times a Day. 2 tabs in am, 1 tab in pm, Disp: , Rfl:      "tamsulosin (FLOMAX) 0.4 MG capsule 24 hr capsule, Take 0.4 mg by mouth daily., Disp: , Rfl:     vitamin D (ERGOCALCIFEROL) 1.25 MG (37367 UT) capsule capsule, Take 1 capsule by mouth Every 30 (Thirty) Days., Disp: , Rfl:     Current outpatient and discharge medications have been reconciled for the patient.  Reviewed by: CITLALLI Smith    The following portions of the patient's history were reviewed and updated as appropriate: allergies, current medications, past family history, past medical history, past social history, past surgical history and problem list.    REVIEW OF SYSTEMS  Review of Systems   Constitutional: Negative.  Negative for appetite change, fatigue and unexpected weight change.   HENT: Negative.     Eyes: Negative.    Respiratory:  Positive for shortness of breath (with exertion).    Cardiovascular: Negative.    Gastrointestinal: Negative.    Endocrine: Negative.    Genitourinary: Negative.    Musculoskeletal: Negative.    Skin: Negative.    Allergic/Immunologic: Negative.    Neurological: Negative.    Hematological:  Bruises/bleeds easily.   Psychiatric/Behavioral: Negative.       PHYSICAL EXAM  VITAL SIGNS:   Vitals:    08/23/24 1306   BP: 105/56   Pulse: 87   SpO2: 91%  Comment: room air with ambulation   Weight: 79.4 kg (175 lb)   Height: 170.2 cm (67\")   PainSc: 0-No pain       Physical Exam  Vitals reviewed.   Constitutional:       Appearance: Normal appearance.   HENT:      Head: Normocephalic.      Nose: Nose normal.   Eyes:      Pupils: Pupils are equal, round, and reactive to light.   Cardiovascular:      Rate and Rhythm: Normal rate and regular rhythm.      Pulses: Normal pulses.      Heart sounds: Normal heart sounds.   Pulmonary:      Effort: Pulmonary effort is normal. No respiratory distress.      Breath sounds: Normal breath sounds. No wheezing.   Abdominal:      General: Bowel sounds are normal.      Palpations: There is no mass.   Musculoskeletal:         General: Normal range " of motion.      Cervical back: Normal range of motion and neck supple. No tenderness.   Lymphadenopathy:      Cervical: No cervical adenopathy.   Skin:     General: Skin is warm and dry.      Capillary Refill: Capillary refill takes less than 2 seconds.   Neurological:      General: No focal deficit present.      Mental Status: He is alert and oriented to person, place, and time.      Motor: No weakness.   Psychiatric:         Mood and Affect: Mood normal.         Behavior: Behavior normal.       Performance Status: ECOG {[facility] ECOG Status:36321}    Clinical Quality Measures  - Pain Documented by Standardized Tool, FPS Hoang Renteria reports a pain score of 0.  Given his pain assessment as noted, treatment options were discussed and the following options were decided upon as a follow-up plan to address the patient's pain:  no pain, no plan given .  Pain Medications               gabapentin (NEURONTIN) 300 MG capsule Take 300 mg by mouth 2 times daily.          - Body Mass Index Screening and Follow-Up Plan  BMI is >= 25 and <30. (Overweight) The following options were offered after discussion;: referral to primary care    - Tobacco Use: Screening and Cessation Intervention  Social History    Tobacco Use      Smoking status: Former        Packs/day: 0.00        Years: 1 pack/day for 65.0 years (65.0 ttl pk-yrs)        Types: Cigarettes        Start date: 2/10/1955        Quit date: 2/10/2020        Years since quittin.5      Smokeless tobacco: Former    - Advanced Care Planning Advance Care Planning   ACP discussion was held with the patient during this visit. Patient does not have an advance directive, information provided.    - Depression screening - PHQ-9 Total Score: 0    ASSESSMENT AND PLAN  1. Recurrent carcinoma of left lung    2. Status post lobectomy of lung    3. 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 History of radiation therapy    5. Former smoker      No orders of the  defined types were placed in this encounter.    RECOMMENDATIONS: Hoang Renteria is status post completion of radiation therapy to the lung and presents to our clinic today for surveillance exam and to review imaging. Diagnosed in March 2020 with Adenocarcinoma of the lung, SANDRO, 1.3 cm. Underwent SANDRO wedge resection on 03/31/2020, pathology revealed  margins negative.Negative for PNI.   RECURRENCE: 03/16/2021 CT Chest revealed SANDRO nodularity increased, 1.8 cm (SUV 5.9). Completed 5000 cGy in 4 fractions to the left upper lobe of the lung on 05/07/2021.     CT-scan of the chest on 08/23/2024 revealed ***.    On exam, I do not see evidence for recurrent or metastatic disease at this time. We will continue routine follow-up/surveillance as discussed in 3 months with follow up CT scan before visit and I have instructed him to continue to see the other health care providers as per their scheduling.    Patient Instructions   1) will call you with CT scans     Return in about 3 months (around 11/23/2024).    Time Spent: I spent 44 minutes caring for Hoang on this date of service. This time includes time spent by me in the following activities: preparing for the visit, reviewing tests, obtaining and/or reviewing a separately obtained history, performing a medically appropriate examination and/or evaluation, counseling and educating the patient/family/caregiver, ordering medications, tests, or procedures, referring and communicating with other health care professionals, documenting information in the medical record, independently interpreting results and communicating that information with the patient/family/caregiver, and care coordination.   Charlie Francis, APRN  08/23/2024         record, independently interpreting results and communicating that information with the patient/family/caregiver, and care coordination.   Charlie Francis, APRN  08/23/2024

## 2024-08-23 ENCOUNTER — HOSPITAL ENCOUNTER (OUTPATIENT)
Dept: CT IMAGING | Facility: HOSPITAL | Age: 84
Discharge: HOME | End: 2024-08-23
Payer: MEDICARE

## 2024-08-23 ENCOUNTER — OFFICE VISIT (OUTPATIENT)
Age: 84
End: 2024-08-23
Payer: MEDICARE

## 2024-08-23 VITALS
DIASTOLIC BLOOD PRESSURE: 56 MMHG | HEART RATE: 87 BPM | SYSTOLIC BLOOD PRESSURE: 105 MMHG | HEIGHT: 67 IN | OXYGEN SATURATION: 91 % | BODY MASS INDEX: 27.47 KG/M2 | WEIGHT: 175 LBS

## 2024-08-23 DIAGNOSIS — J44.9 STAGE 2 MODERATE COPD BY GOLD CLASSIFICATION: ICD-10-CM

## 2024-08-23 DIAGNOSIS — Z87.891 FORMER SMOKER: ICD-10-CM

## 2024-08-23 DIAGNOSIS — Z92.3 HISTORY OF RADIATION THERAPY: ICD-10-CM

## 2024-08-23 DIAGNOSIS — C34.92 RECURRENT CARCINOMA OF LEFT LUNG: Primary | ICD-10-CM

## 2024-08-23 DIAGNOSIS — Z90.2 STATUS POST LOBECTOMY OF LUNG: ICD-10-CM

## 2024-08-23 DIAGNOSIS — C34.92 RECURRENT CARCINOMA OF LEFT LUNG: ICD-10-CM

## 2024-08-23 LAB — CREAT BLDA-MCNC: 1.9 MG/DL (ref 0.6–1.3)

## 2024-08-23 PROCEDURE — 71260 CT THORAX DX C+: CPT

## 2024-08-23 PROCEDURE — G0463 HOSPITAL OUTPT CLINIC VISIT: HCPCS | Performed by: RADIOLOGY

## 2024-08-23 PROCEDURE — 25510000001 IOPAMIDOL 61 % SOLUTION: Performed by: RADIOLOGY

## 2024-08-23 PROCEDURE — 82565 ASSAY OF CREATININE: CPT

## 2024-08-23 RX ORDER — CLOPIDOGREL BISULFATE 75 MG/1
75 TABLET ORAL DAILY
COMMUNITY

## 2024-08-23 RX ORDER — FUROSEMIDE 40 MG
1 TABLET ORAL DAILY
COMMUNITY
Start: 2024-07-21

## 2024-08-23 RX ORDER — DILTIAZEM HYDROCHLORIDE 120 MG/1
1 CAPSULE, COATED, EXTENDED RELEASE ORAL DAILY
COMMUNITY
Start: 2024-07-21

## 2024-08-23 RX ORDER — APIXABAN 5 MG/1
5 TABLET, FILM COATED ORAL 2 TIMES DAILY
COMMUNITY

## 2024-08-23 RX ADMIN — IOPAMIDOL 80 ML: 612 INJECTION, SOLUTION INTRAVENOUS at 11:18

## 2024-09-11 ENCOUNTER — OFFICE VISIT (OUTPATIENT)
Dept: PULMONOLOGY | Facility: CLINIC | Age: 84
End: 2024-09-11
Payer: MEDICARE

## 2024-09-11 VITALS
HEART RATE: 96 BPM | SYSTOLIC BLOOD PRESSURE: 100 MMHG | WEIGHT: 176 LBS | DIASTOLIC BLOOD PRESSURE: 60 MMHG | HEIGHT: 67 IN | BODY MASS INDEX: 27.62 KG/M2 | OXYGEN SATURATION: 92 %

## 2024-09-11 DIAGNOSIS — J44.9 STAGE 2 MODERATE COPD BY GOLD CLASSIFICATION: Primary | ICD-10-CM

## 2024-09-11 DIAGNOSIS — C34.92 RECURRENT CARCINOMA OF LEFT LUNG: ICD-10-CM

## 2024-09-11 DIAGNOSIS — N18.30 STAGE 3 CHRONIC KIDNEY DISEASE, UNSPECIFIED WHETHER STAGE 3A OR 3B CKD: ICD-10-CM

## 2024-09-11 PROCEDURE — 99214 OFFICE O/P EST MOD 30 MIN: CPT | Performed by: INTERNAL MEDICINE

## 2024-09-11 NOTE — PROGRESS NOTES
Background:  Pt w lung ca wedge res 2020, local recurrence tx sbrt, gold 2 copd, hx RA, CAD CKD   Chief Complaint  COPD    Subjective    History of Present Illness     Hoang Renteria is here for follow up with [facility].  History of Present Illness  He was in the hospital in May and treated for copd and heart disease.  He received a new stent, now on plavix.  A new ct chest has shown nodules with background hx of lung cancer treated with surgery and later xrt.     Tobacco Use: Medium Risk (9/11/2024)    Patient History     Smoking Tobacco Use: Former     Smokeless Tobacco Use: Former     Passive Exposure: Not on file      Current Outpatient Medications   Medication Instructions    albuterol sulfate  (90 Base) MCG/ACT inhaler 2 puffs, Inhalation, Every 4 Hours PRN, Patient using 2 puffs twice a day    allopurinol (ZYLOPRIM) 100 mg, Oral, Daily    atorvastatin (LIPITOR) 10 mg, Oral, 3 Times Weekly, mon-wed-fri    clobetasol (TEMOVATE) 0.05 % cream Topical, 2 Times Daily    clopidogrel (PLAVIX) 75 mg, Oral, Daily    Cobalamine Combinations (B-12) 100-5000 MCG sublingual tablet Sublingual    colestipol (COLESTID) 1 g, Oral, 4 Times Daily    dilTIAZem CD (CARDIZEM CD) 120 MG 24 hr capsule 1 capsule, Oral, Daily    Eliquis 5 mg, Oral, 2 Times Daily    fluticasone (FLONASE) 50 MCG/ACT nasal spray 2 sprays, Nasal, Daily    furosemide (LASIX) 40 MG tablet 1 tablet, Oral, Daily    gabapentin (NEURONTIN) 300 MG capsule Take 300 mg by mouth 2 times daily.    hydroxychloroquine (PLAQUENIL) 200 mg, Oral, Daily    losartan (COZAAR) 25 MG tablet Take 25 mg by mouth    nitroglycerin (NITROSTAT) 0.4 MG SL tablet Place 0.4 mg under the tongue every 5 minutes as needed.    Omega 3 1200 MG capsule 1 capsule, Oral    sodium bicarbonate 325 mg, Oral, 2 Times Daily, 2 tabs in am, 1 tab in pm    tamsulosin (FLOMAX) 0.4 MG capsule 24 hr capsule Take 0.4 mg by mouth daily.     "vitamin D (ERGOCALCIFEROL) 50,000 Units, Oral, Every 30 Days      Objective     Vital Signs:   /60   Pulse 96   Ht 170.2 cm (67\")   Wt 79.8 kg (176 lb)   SpO2 92% Comment: RA  BMI 27.57 kg/m²   Physical Exam  Constitutional:       Appearance: Normal appearance.   Eyes:      Extraocular Movements: Extraocular movements intact.   Pulmonary:      Effort: Pulmonary effort is normal.      Breath sounds: No rhonchi or rales.   Abdominal:      General: There is no distension.   Neurological:      Mental Status: He is alert.        Result Review  Data Reviewed:    CT Chest With Contrast Diagnostic    Result Date: 8/23/2024  Impression: 1. New findings worrisome for progression of disease, with several new nodules in the lungs, the largest is seen in the left lower lobe and measures 1.3 cm PET/CT may be helpful for follow-up. 2. No intrathoracic lymphadenopathy is identified. 3. Stable pleural parenchymal fibrosis in the left upper hemithorax probably related to previous radiation therapy.      This report was signed and finalized on 8/23/2024 3:34 PM by Dr. Otis Genao MD.       PFT Values          6/14/2023    09:00   Pre Drug PFT Results   FVC 90   FEV1 74   FEF 25-75% 44   FEV1/FVC 62                Assessment and Plan    Diagnoses and all orders for this visit:    1. 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 Recurrent carcinoma of left lung    3. Stage 3 chronic kidney disease, unspecified whether stage 3a or 3b CKD    Will plan await follow up ct to reassess nodules, particularly the one in LLL  Cannot stop plavix at this point after stent per Dr. Ireland in 5/2024, so we could not do bronchoscopy anyway now.  Notably also on eliquis which would have to be held for invasive procedure.  Follow back up after next ct.     Follow Up   Return in about 11 weeks (around 11/27/2024).  Patient was given instructions and counseling regarding his condition or for health maintenance advice. Please " see specific information pulled into the AVS if appropriate.    Electronically signed by Camron Borjas MD, 9/11/2024, 22:10 CDT

## 2024-11-07 DIAGNOSIS — J44.9 STAGE 2 MODERATE COPD BY GOLD CLASSIFICATION: ICD-10-CM

## 2024-11-07 DIAGNOSIS — Z92.3 HISTORY OF RADIATION THERAPY: ICD-10-CM

## 2024-11-07 DIAGNOSIS — C34.92 RECURRENT CARCINOMA OF LEFT LUNG: Primary | ICD-10-CM

## 2024-11-07 DIAGNOSIS — Z87.891 FORMER SMOKER: ICD-10-CM

## 2024-11-07 DIAGNOSIS — Z90.2 STATUS POST LOBECTOMY OF LUNG: ICD-10-CM

## 2024-11-25 ENCOUNTER — HOSPITAL ENCOUNTER (OUTPATIENT)
Dept: CT IMAGING | Facility: HOSPITAL | Age: 84
Discharge: HOME | End: 2024-11-25
Payer: MEDICARE

## 2024-11-25 DIAGNOSIS — C34.92 RECURRENT CARCINOMA OF LEFT LUNG: ICD-10-CM

## 2024-11-25 DIAGNOSIS — J44.9 STAGE 2 MODERATE COPD BY GOLD CLASSIFICATION: ICD-10-CM

## 2024-11-25 DIAGNOSIS — Z87.891 FORMER SMOKER: ICD-10-CM

## 2024-11-25 DIAGNOSIS — Z90.2 STATUS POST LOBECTOMY OF LUNG: ICD-10-CM

## 2024-11-25 DIAGNOSIS — Z92.3 HISTORY OF RADIATION THERAPY: ICD-10-CM

## 2024-11-25 PROCEDURE — 71250 CT THORAX DX C-: CPT

## 2024-11-25 NOTE — PROGRESS NOTES
[facility]  Radiation Oncology Clinic   Luis Felipe Kohli MD, FACR  Charlie LENNON  _______________________________________________  Psychiatric  Department of Radiation Oncology  [address] [place] [id number]  Office: [phone]  Fax: [phone]    DATE: 12/02/2024  PATIENT: Hoang Renteria  1940                         MEDICAL RECORD #: [id number]    1. Recurrent carcinoma of left lung    2. Status post lobectomy of lung    3. 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 History of radiation therapy    5. Former smoker                                              REASON FOR VISIT:    Chief Complaint   Patient presents with    Lung Cancer     Reason for Follow up Visit:  Hoang Renteria is a very pleasant 84 y.o. patient that completed radiation to the lung and returns to the clinic today for routine follow up exam. Reports fatigue, cough, and SOB. Denies appetite change, unexpected weight change, nasuea/vomiting, diarrhea, light-headedness, weakness, and headaches. He continues to follow .     History of Present Illness:  06/22/2018 - CT Lung screening (Annual):  Lung rads category 4A-there are several foci of nodularity within the lung. The largest of the nodules measure 6 mm in size in the the right lower lobe.   There is also a focus of nodularity within the inferior lingular segment of the left upper lobe which radiographically has the appearance of atelectasis and/or scarring.   The nodular component does measure approximately 1.5 cm in long axis and I feel would categorize this patient into a 4a category.   Follow-up low dose CT in 3 months is recommended to assure stability of this particular finding.     09/24/2018 - CT Lung screening (Annual):  Lung rads category   Overall stable appearance of the nodules from the previous study of 6/22/2018 except for interval diminishment in size of a focus of  atelectasis/scarring within the lingular segment of the left upper lobe.   No new nodules are present.   Follow-up low dose CT imaging in 12 months is recommended.     01/16/2020 - CT Chest with and without contrast:  Left upper lobe with a 14 x 7 mm pulmonary nodule, previously 6 mm on 9/24/2018.   Right lower lobe with 7 mm pleural-based pulmonary nodule, previously 7 mm on 9/24/2018.     01/24/2020 - PET Scan:  Left upper lobe pulmonary nodule, only mildly hypermetabolic with a maximum density of 1.87 SUV, equivocal for malignancy. Follow-up recommended.   No scintigraphic evidence of hypermetabolic neoplastic disease.     02/04/2020 - Pulmonary Function tests:  FVC - 87   FEV1 - 76   FEF 25-75% - 42   FEV1/FVC - 67.35   DLCO - 55   D/VAsb - 52     02/04/2020 - Appointment with :  The series of images is reviewed.   I am very concerned about the progressive growth, despite the equivocal pet result, with risk for lung cancer.   Refer to CT surgery for eval for wedge +/- lobectomy. FEV1 ok, although DLCO is reduced.I am giving him a sample of anoro and we showed him how to use it. I do not know why epic did not embed that action under plan above, but I can't get the program to do that.   Recommend smoking abstinence.   On anticoagulation which will need to be held prior to surgery.    03/31/2020 - Lung, left upper lobe wedge resection - per :  Moderately differentiated lung adenocarcinoma, margins negative.   Carcinoma measures 1.3 cm in greatest dimension.   Negative for evidence of pleural invasion.   Surgical excision margins are negative for evidence of malignancy.   Mild subpleural emphysematous changes involving nonneoplastic lung parenchyma.   AJCC STAGE: pT1a, pNx, pMx     05/06/2020 - Appointment with :  We will plan follow-up in September with repeat chest CT as well as pulmonary function testing then.  We will try to see him for a physical real visit in the office.    Unable to  do physical exam over the phone other than just listening to his voice.    His voice is strong and able to complete full sentences and is in good spirits and good humor.    08/31/2020 - CT Chest without contrast:  Postoperative change of thoracotomy with LEFT upper lobe wedge resection.   No evidence of residual or metastatic disease.   There is nodular thickening along the wedge resection line which is likely postoperative scarring but recommend continued imaging surveillance.     09/08/2020 - Appointment with :  Patient seems to be stable from an oncology standpoint.  Latest CT of the chest at the end of last month at [facility] is negative.  We will continue to follow him with intermittent imaging.  He did have a wedge resection.  COPD is at least moderate based on his latest spirometry.    He has not responded well to other inhalers in the past.    I do have some Bevespi samples and will give him a trial of that, 2 puffs twice a day.  Coronavirus avoidance.    We will follow him up with spirometry in a few months.    We will get follow-up imaging next year as well.    We will order that when he comes back in.    03/09/2021 - Appointment with :  Will plan follow up ct. Pt gets all his films at [place] [facility], so we will order it there and will need to get a disc to review. I told him to make sure we discuss result over phone.   Discussed potential for data transfer failure between computer systems at the 2 institutions.   Follow up in 6 months with spirometry. Continue mobilization as much as he can.   Could benefit from pulm rehab once coronavirus pandemic improves.    03/16/2021 - CT Chest without contrast:  Soft tissue nodule adjacent to suture material in the left upper lobe is concerning for recurrent disease.   Multiple renal lesions, incompletely characterized on this exam. Follow-up nonemergent renal ultrasound recommended.   Atherosclerosis of the aorta and coronary  arteries.    03/19/2021 - Appointment with :  We discussed the findings and reviewed the film Primary and most substantial concern is possible recurrence of lung cancer in patient with prior smoking history.  Other possibilities include hypertrophic scarring at surgical site, reaction to coronavirus vaccine (doubt, but brought up by pt), granuloma or other benign disease, metastatic lesion from renal lesions (also doubtful).   Will plan PET scan.   If abnormal and suggestive of malignancy, and no other sites identified, then he is a candidate for stereotactic radiosurgery.   If negative or equivocal, then ongoing follow up could be considered as an alternative.   Tissue diagnosis would likely require robotic bronchoscopy.   COPD is stable    03/31/2021 - PET Scan:  Solitary left upper lobe pulmonary nodule with abnormal SUV 5.9 allograft   There are no other regions of abnormal metabolic activity.     04/07/2021 - Appointment with :  Following this discussion and in consideration of the diagnostic data/evaluation of the patient, I recommended a course of stereotactic radiosurgery to the SANDRO nodule, will simulate treatment fields today, 3D CT with MIPS to begin the planning process, final course pending.    04/27/2021 - 05/07/2021 - Completed Radiation course:  Received 5000 cGy in 4 fractions to left upper lobe of lung via stereotactic radiosurgery.    05/19/2021 - Appointment with :  Return in about 3 months (around 8/19/2021).    08/02/2021 - CT Chest:  Decreased soft tissue component along the left upper lobe suture  line with probable postradiation changes.   Stable right lower lobe 8 mm pulmonary nodule compared to 3/16/2021.   Heavily calcified coronary arteries versus stent.     08/09/2021 - Appointment with :  Your scan looks great, you are in remission!   Return to Dr. Bruner in 3 months with a CT scan before.     10/27/2021 - CT chest without contrast:  Increasing left  upper and superior segment left lower lobe opacities compared to 8/2/2021 which may represent sequelae of radiation. Superimposed pneumonia considered. Similar postoperative changes of the left upper lung. Stable 8 mm subpleural right lower lobe nodule. No new dominant nodules identified.   Left coronary artery stents and/or calcifications.     11/10/2021 - Appointment with :  We will order a PET scan, they will call you to schedule.  Otherwise we will see you back in 3 months with a CT chest before.     11/12/2021 - PET Scan:  There is diffuse uptake in the left upper lobe and superior segment left lower lobe that corresponds to infiltrates seen on recent CT. The SUV max is 4.3. Residual tumor in this area cannot be ruled out. Most of the uptake is likely infectious/inflammatory.  No evidence of any distant metastatic disease.    02/07/2022 - CT Chest with contrast:  Scarlike infiltrate with focal calcification in the left upper lobe is slightly smaller now and postradiation changes favored.  No new lung abnormality.    02/10/2022 - Appointment with :  Follow up in 3 months     03/11/2022 - CT Abdomen/Pelvis with contrast:  1.5 cm left lower renal pole enhancing mass is most concerning for renal cell carcinoma.   Asymmetric soft tissue density in the posterior dome of the bladder wall. Although the bladder isn't very distended, underlying opacity is not excluded.     05/05/2022 - CT Chest with contrast:  Decrease in masslike consolidation in the LEFT upper lobe along the wedge resection suture line. Favor posttreatment change but recommend continued imaging surveillance/follow-up.   No evidence of metastatic disease in the chest.    05/11/2022 - Appointment with :  Follow up in 4 months with Chest CT    06/13/2022 - Appointment with :  Follow up in one year     09/29/2022 - CT chest with contrast:  Stable appearance of the chest from previous study 5/5/2022. No radiographic  evidence of localized recurrence or metastatic disease.  Scarring within the periphery of the left upper lobe with involvement of the adjacent superior segment of the left lower lobe with associated pleural thickening and calcification. This is felt to represent post therapeutic change adjacent to a staple line and is stable from the previous exam. No new or developing pulmonary nodules are present. No developing mediastinal, hilar or axillary adenopathy.    10/06/2022 - Appointment with :  Follow up in 4 months     02/03/2023 - CT Chest with contrast:  No evidence of recurrent or metastatic disease. Stable posttreatment change in the LEFT upper chest.  There is a single new 4 mm pulmonary nodule in the RIGHT upper lobe. Favor this to be related to an infectious or inflammatory process but recommend follow-up imaging to confirm resolution.    02/08/2023 - Appointment with :  Return in 3 months with a CT chest before    05/08/2023 - CT Chest with contrast:  No evidence of recurrent or metastatic disease. Stable post treatment change in the LEFT upper chest.  No change in the 4 mm RIGHT upper lobe pulmonary nodule that was new on the prior study. No new or enlarging pulmonary nodule    05/10/2023 - Appointment with :  On exam, I do not see evidence for recurrent or metastatic disease at this time. We will continue routine follow-up/surveillance as discussed in 6 months with follow up CT scan prior to next visit. I have instructed him to continue to see the other health care providers as per their scheduling.  Plan:  Return in 6 months with a CT chest before    06/07/2023 - CT Abdomen/pelvis with contrast:  Enhancing mass in the left lower renal pole suspicious for neoplasm.  Size currently measures 2.1 cm, previously reported as 1.5 cm.   Nephrolithiasis.  No hydronephrosis.   Extensive atherosclerosis. Coronary calcifications.   Mildly thickened bladder wall, likely associated with chronic  outlet obstruction.  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     11/07/2023 - CT Chest with contrast:  LEFT kidney with a 1.5 cm lesion which appears to be enhancing. This is concerning for renal cell carcinoma until proven otherwise. There are also 2 indeterminate LEFT renal lesions in the field-of-view. Recommend CT abdomen pelvis without and with contrast (renal mass protocol) for further evaluation.  There are a few scattered tiny pulmonary nodules and groundglass opacities as described above, which could be infectious/inflammatory. Similar posttreatment changes to the LEFT lung. Recommend follow-up CT chest in 3 months or per oncology.  Heavily calcified coronary arteries versus stent material. 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    11/13/2023 - Appointment with :  Follow up in 3 months     11/22/2023 - Urinary bladder, biopsy:   Benign urothelial mucosa with moderate chronic inflammation and changes consistent with cystitis glandularis.     02/02/2024 - CT chest with contrast:  Stable pleural-based masslike opacity in the left upper lobe is likely posttreatment related.  Several solid nodules bilaterally are stable. There has been interval development of a few groundglass and solid nodules bilaterally as described above. These may be infectious in etiology however continued attention on follow-up is recommended.  Subcentimeter groundglass nodules in the right upper lobe on the prior examination have resolved.    02/02/2024 - CT Abdomen/Pelvis with contrast:  Exophytic mass at the mid pole of the left kidney represents a hyperdense cyst. There are multiple other bilateral renal cysts, vascular calcifications, and probable nonobstructing renal stones.  No acute abnormality identified. There is no CT evidence for metastatic disease.    02/05/2024 - Appointment with :  Return in 6 months with a CT chest before     07/15/2024 - Presented to ER with complaints of shortness  of breath.     07/16/2024 - CT Chest without contrast:  Bilateral dependent pleural effusions, moderate on the right and small on the left, with mild pulmonary edema of both lower lungs.   Bilateral pneumonia as described.  Follow-up CT chest 1 month is recommended.   6.1 cm mass-like consolidation superimposed on suture material in the left upper lobe, probably at least partially due to postradiation therapy fibrosis.  Residual or recurrent mass cannot be excluded.  Correlation with prior studies, if available, might be helpful.  Attention to this finding in the follow-up scan 1 month.   Mild mediastinal and bilateral axillary adenopathy, potentially reactive or secondary to old granulomatous disease.  Metastatic disease cannot be excluded.   Moderate emphysema.     07/20/2024 - Discharged from [facility] with follow up appointments scheduled.     08/23/2024 - CT chest with contrast:  New findings worrisome for progression of disease, with several new nodules in the lungs, the largest is seen in the left lower lobe and measures 1.3 cm PET/CT may be helpful for follow-up.  No intrathoracic lymphadenopathy is identified.  Stable pleural parenchymal fibrosis in the left upper hemithorax probably related to previous radiation therapy.    08/23/2024 - Appointment with :  Follow up in 3 months    09/11/2024 - Appointment with :  Will plan await follow up ct to reassess nodules, particularly the one in LLL  Cannot stop plavix at this point after stent per Dr. Ireland in 5/2024, so we could not do bronchoscopy anyway now.  Notably also on eliquis which would have to be held for invasive procedure.  Follow back up after next ct.     11/25/2024 - CT Chest without contrast:  Ill-defined patchy areas of infiltrate/groundglass opacity/infiltrate in the lungs bilaterally may represent an evolving acute inflammatory/infectious process. These were not seen in the previous study. A follow-up examination in 3 months is  recommended to ensure resolution.  A persistent and stable pleural-based masslike density/fibrosis in the left upper lung with intrinsic calcification and surrounding suture line.  Low-density nodules in the liver and the kidneys which are incompletely visualized and evaluated in this study.         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 2020      PAST SURGICAL HISTORY  Past Surgical History:   Procedure Laterality Date    CHOLECYSTECTOMY  2022    COLONOSCOPY  2016    three polyps  all removed    COLONOSCOPY N/A 2019    Procedure: COLONOSCOPY WITH ANESTHESIA;  Surgeon: Reddy Ann DO;  Location:  [facility];  Service: Gastroenterology    COLONOSCOPY N/A 2023    Procedure: COLONOSCOPY WITH ANESTHESIA;  Surgeon: Reddy Ann DO;  Location:  [facility];  Service: Gastroenterology;  Laterality: N/A;  Pre: Diarrhea, History of adenomatous polyp of colon  Post: polyp  Laura Weston MD    ENDOSCOPY N/A 2022    Procedure: ESOPHAGOGASTRODUODENOSCOPY WITH ANESTHESIA;  Surgeon: Reddy Ann DO;  Location: [facility];  Service: Gastroenterology;  Laterality: N/A;  Pre: Dysphagia  Post: Duodenitis  Laura Weston MD    HERNIA REPAIR      LUNG BIOPSY        FAMILY HISTORY  family history includes Cancer in his mother; Colon polyps in his sister.    SOCIAL HISTORY  Social History     Tobacco Use    Smoking status: Former     Current packs/day: 0.00     Average packs/day: 1 pack/day for 65.0 years (65.0 ttl pk-yrs)     Types: Cigarettes     Start date: 2/10/1955     Quit date: 2/10/2020     Years since quittin.8     Passive exposure: Past    Smokeless tobacco:  Former   Vaping Use    Vaping status: Never Used   Substance Use Topics    Alcohol use: Not Currently    Drug use: No     ALLERGIES  Ipratropium and Sulfamethoxazole-trimethoprim     MEDICATIONS    Current Outpatient Medications:     albuterol sulfate  (90 Base) MCG/ACT inhaler, Inhale 2 puffs Every 4 (Four) Hours As Needed for Wheezing. Patient using 2 puffs twice a day, Disp: , Rfl:     allopurinol (ZYLOPRIM) 100 MG tablet, Take 1 tablet by mouth Daily., Disp: , Rfl:     clobetasol (TEMOVATE) 0.05 % cream, Apply  topically to the appropriate area as directed 2 (Two) Times a Day., Disp: , Rfl:     clopidogrel (PLAVIX) 75 MG tablet, Take 1 tablet by mouth Daily., Disp: , Rfl:     Cobalamine Combinations (B-12) 100-5000 MCG sublingual tablet, Place  under the tongue., Disp: , Rfl:     colestipol (COLESTID) 1 g tablet, Take 1 tablet by mouth 4 (Four) Times a Day., Disp: 120 tablet, Rfl: 11    dilTIAZem CD (CARDIZEM CD) 120 MG 24 hr capsule, Take 1 capsule by mouth Daily., Disp: , Rfl:     Eliquis 5 MG tablet tablet, Take 1 tablet by mouth 2 (Two) Times a Day., Disp: , Rfl:     fluticasone (FLONASE) 50 MCG/ACT nasal spray, Administer 2 sprays into the nostril(s) as directed by provider Daily., Disp: , Rfl:     furosemide (LASIX) 40 MG tablet, Take 1 tablet by mouth Daily., Disp: , Rfl:     gabapentin (NEURONTIN) 300 MG capsule, Take 300 mg by mouth 2 times daily., Disp: , Rfl:     hydroxychloroquine (PLAQUENIL) 200 MG tablet, Take 1 tablet by mouth Daily., Disp: , Rfl:     nitroglycerin (NITROSTAT) 0.4 MG SL tablet, Place 0.4 mg under the tongue every 5 minutes as needed., Disp: , Rfl:     Omega 3 1200 MG capsule, Take 1 capsule by mouth., Disp: , Rfl:     sodium bicarbonate 325 MG tablet, Take 1 tablet by mouth 2 (Two) Times a Day. 2 tabs in am, 1 tab in pm, Disp: , Rfl:     tamsulosin (FLOMAX) 0.4 MG capsule 24 hr capsule, Take 0.4 mg by mouth daily., Disp: , Rfl:     vitamin D (ERGOCALCIFEROL) 1.25 MG (25034  "UT) capsule capsule, Take 1 capsule by mouth Every 30 (Thirty) Days., Disp: , Rfl:     Current outpatient and discharge medications have been reconciled for the patient.  Reviewed by: CITLALLI Smith    The following portions of the patient's history were reviewed and updated as appropriate: allergies, current medications, past family history, past medical history, past social history, past surgical history and problem list.    REVIEW OF SYSTEMS  Review of Systems   Constitutional:  Positive for fatigue.   HENT: Negative.     Eyes: Negative.    Respiratory:  Positive for cough (occasionally productive with clear/white sputum) and shortness of breath (stable per patient).    Cardiovascular: Negative.    Gastrointestinal: Negative.    Endocrine: Negative.    Genitourinary: Negative.    Musculoskeletal: Negative.    Skin: Negative.    Allergic/Immunologic: Negative.    Neurological: Negative.    Hematological: Negative.    Psychiatric/Behavioral: Negative.       PHYSICAL EXAM  VITAL SIGNS:   Vitals:    12/02/24 0829   BP: 94/57   Pulse: 116   SpO2: 91%  Comment: room air   Weight: 79.3 kg (174 lb 14.4 oz)   Height: 170.2 cm (67\")   PainSc: 0-No pain     Physical Exam  Vitals reviewed.   Constitutional:       Appearance: Normal appearance.   HENT:      Head: Normocephalic.      Nose: Nose normal.   Eyes:      Pupils: Pupils are equal, round, and reactive to light.   Cardiovascular:      Rate and Rhythm: Normal rate and regular rhythm.      Pulses: Normal pulses.      Heart sounds: Normal heart sounds.   Pulmonary:      Effort: Pulmonary effort is normal. No respiratory distress.      Breath sounds: Normal breath sounds. No wheezing.   Abdominal:      General: Bowel sounds are normal.      Palpations: There is no mass.   Musculoskeletal:         General: Normal range of motion.      Cervical back: Normal range of motion and neck supple. No tenderness.   Lymphadenopathy:      Cervical: No cervical adenopathy. "   Skin:     General: Skin is warm and dry.      Capillary Refill: Capillary refill takes less than 2 seconds.   Neurological:      General: No focal deficit present.      Mental Status: He is alert and oriented to person, place, and time.      Motor: No weakness.   Psychiatric:         Mood and Affect: Mood normal.         Behavior: Behavior normal.        Performance Status: ECOG (2) Ambulatory and capable of self care, unable to carry out work activity, up and about > 50% or waking hours    Clinical Quality Measures  - Pain Documented by Standardized Tool, FPS Hoang Renteria reports a pain score of 0. Given his pain assessment as noted, treatment options were discussed and the following options were decided upon as a follow-up plan to address the patient's pain: continuation of current treatment plan for pain.  Pain Medications               gabapentin (NEURONTIN) 300 MG capsule Take 300 mg by mouth 2 times daily.          - Body Mass Index Screening and Follow-Up Plan   Body mass index is 27.39 kg/m². Defer to PCP.     - Tobacco Use: Screening and Cessation Intervention  Social History    Tobacco Use      Smoking status: Former        Packs/day: 0.00        Years: 1 pack/day for 65.0 years (65.0 ttl pk-yrs)        Types: Cigarettes        Start date: 2/10/1955        Quit date: 2/10/2020        Years since quittin.8        Passive exposure: Past      Smokeless tobacco: Former    - Advanced Care Planning Advance Care Planning   ACP discussion was held with the patient during this visit. Patient does not have an advance directive, information provided.    - PHQ-2 Depression Screening  Little interest or pleasure in doing things? Not at all   Feeling down, depressed, or hopeless? Not at all   PHQ-2 Total Score 0     ASSESSMENT AND PLAN  1. Recurrent carcinoma of left lung    2. Status post lobectomy of lung    3. 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 History of radiation therapy    5. Former  smoker      Orders Placed This Encounter   Procedures    CT Chest Without Contrast     Standing Status:   Future     Standing Expiration Date:   12/2/2025     Order Specific Question:   Does this exam require [facility] Bronch Protocol?     Answer:   No     Order Specific Question:   Reason for Exam:     Answer:   history of lung cancer, SBRT     Order Specific Question:   Release to patient     Answer:   Routine Release [[id number]]     RECOMMENDATIONS: Hoang Renteria is status post completion of radiation therapy to the lung and presents to our clinic today for surveillance exam and to review imaging. Diagnosed in 2020 with Adenocarcinoma of the lung, SANDRO. Underwent SANDRO wedge resection on 03/31/2020.  03/16/2021 CT Chest revealed SANDRO nodularity increased, 1.8 cm (SUV 5.9). Completed 5000 cGy in 4 fractions to the left upper lobe of the lung on 05/07/2021.     CT-scan of the chest on 11/25/2024 revealed ill-defined patchy areas of infiltrate/groundglass opacity/infiltrate in the lungs bilaterally may represent an evolving acute inflammatory/infectious process. These were not seen in the previous study. A follow-up examination in 3 months is recommended to ensure resolution. A persistent and stable pleural-based masslike density/fibrosis in the left upper lung with intrinsic calcification and surrounding suture line. Low-density nodules in the liver and the kidneys which are incompletely visualized and evaluated in this study.    On exam, I do not see evidence for recurrent or metastatic disease at this time. We will continue routine follow-up/surveillance as discussed in 3 months with follow up CT scan before visit and I have instructed him to continue to see the other health care providers as per their scheduling.    Patient Instructions   1) will recheck CT chest in 3 months     Return in about 3 months (around 3/2/2025).    Time Spent: I spent 44 minutes caring for Hoang on this date of service. This time includes  time spent by me in the following activities: preparing for the visit, reviewing tests, obtaining and/or reviewing a separately obtained history, performing a medically appropriate examination and/or evaluation, counseling and educating the patient/family/caregiver, ordering medications, tests, or procedures, referring and communicating with other health care professionals, documenting information in the medical record, independently interpreting results and communicating that information with the patient/family/caregiver, and care coordination.     Charlie Francis, CITLALLI  12/02/2024

## 2024-12-02 ENCOUNTER — HOSPITAL ENCOUNTER (OUTPATIENT)
Dept: RADIATION ONCOLOGY | Facility: HOSPITAL | Age: 84
Setting detail: RADIATION/ONCOLOGY SERIES
End: 2024-12-02
Payer: MEDICARE

## 2024-12-02 ENCOUNTER — OFFICE VISIT (OUTPATIENT)
Age: 84
End: 2024-12-02
Payer: MEDICARE

## 2024-12-02 VITALS
BODY MASS INDEX: 27.45 KG/M2 | SYSTOLIC BLOOD PRESSURE: 94 MMHG | DIASTOLIC BLOOD PRESSURE: 57 MMHG | WEIGHT: 174.9 LBS | HEART RATE: 116 BPM | HEIGHT: 67 IN | OXYGEN SATURATION: 91 %

## 2024-12-02 DIAGNOSIS — Z87.891 FORMER SMOKER: ICD-10-CM

## 2024-12-02 DIAGNOSIS — Z90.2 STATUS POST LOBECTOMY OF LUNG: ICD-10-CM

## 2024-12-02 DIAGNOSIS — Z92.3 HISTORY OF RADIATION THERAPY: ICD-10-CM

## 2024-12-02 DIAGNOSIS — J44.9 STAGE 2 MODERATE COPD BY GOLD CLASSIFICATION: ICD-10-CM

## 2024-12-02 DIAGNOSIS — Z85.118 HISTORY OF PRIMARY NON-SMALL CELL CARCINOMA OF LEFT LUNG: Primary | ICD-10-CM

## 2024-12-02 PROCEDURE — G0463 HOSPITAL OUTPT CLINIC VISIT: HCPCS | Performed by: RADIOLOGY

## 2024-12-09 ENCOUNTER — OFFICE VISIT (OUTPATIENT)
Dept: PULMONOLOGY | Facility: CLINIC | Age: 84
End: 2024-12-09
Payer: MEDICARE

## 2024-12-09 VITALS
OXYGEN SATURATION: 92 % | WEIGHT: 174 LBS | DIASTOLIC BLOOD PRESSURE: 60 MMHG | SYSTOLIC BLOOD PRESSURE: 105 MMHG | HEART RATE: 101 BPM | BODY MASS INDEX: 27.31 KG/M2 | HEIGHT: 67 IN

## 2024-12-09 DIAGNOSIS — C34.92 RECURRENT CARCINOMA OF LEFT LUNG: ICD-10-CM

## 2024-12-09 DIAGNOSIS — J44.9 STAGE 2 MODERATE COPD BY GOLD CLASSIFICATION: ICD-10-CM

## 2024-12-09 DIAGNOSIS — R91.8 LUNG INFILTRATE: Primary | ICD-10-CM

## 2024-12-09 DIAGNOSIS — Z87.891 PERSONAL HISTORY OF NICOTINE DEPENDENCE: ICD-10-CM

## 2024-12-09 PROCEDURE — G2211 COMPLEX E/M VISIT ADD ON: HCPCS | Performed by: INTERNAL MEDICINE

## 2024-12-09 PROCEDURE — 99214 OFFICE O/P EST MOD 30 MIN: CPT | Performed by: INTERNAL MEDICINE

## 2024-12-09 NOTE — PROGRESS NOTES
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  History of Present Illness  Pt follows up for moderate copd with comorbidities of cad, hfpef requiring hospitalization at [facility] earlier this year, hx lung cancer with sedge resection and sbrt for recurrence 4 years ago.  He has new ct since I saw him last.  He is doing better overall but he had a spell of copd flare resolved with a steroid pack and some neb tx bid.  He desaturated with this spell and that has resolved.  He will occasionally drop to 87     Tobacco Use: Medium Risk (12/9/2024)    Patient History     Smoking Tobacco Use: Former     Smokeless Tobacco Use: Former     Passive Exposure: Past      Current Outpatient Medications   Medication Instructions    albuterol sulfate  (90 Base) MCG/ACT inhaler 2 puffs, Every 4 Hours PRN    allopurinol (ZYLOPRIM) 100 mg, Daily    clobetasol (TEMOVATE) 0.05 % cream 2 Times Daily    clopidogrel (PLAVIX) 75 mg, Daily    Cobalamine Combinations (B-12) 100-5000 MCG sublingual tablet Place  under the tongue.    colestipol (COLESTID) 1 g, Oral, 4 Times Daily    dilTIAZem CD (CARDIZEM CD) 120 MG 24 hr capsule 1 capsule, Daily    Eliquis 5 mg, 2 Times Daily    fluticasone (FLONASE) 50 MCG/ACT nasal spray 2 sprays, Daily    furosemide (LASIX) 40 MG tablet 1 tablet, Daily    gabapentin (NEURONTIN) 300 MG capsule Take 300 mg by mouth 2 times daily.    hydroxychloroquine (PLAQUENIL) 200 mg, Daily    nitroglycerin (NITROSTAT) 0.4 MG SL tablet Place 0.4 mg under the tongue every 5 minutes as needed.    Omega 3 1200 MG capsule 1 capsule    sodium bicarbonate 325 mg, 2 Times Daily    tamsulosin (FLOMAX) 0.4 MG capsule 24 hr capsule Take 0.4 mg by mouth daily.     "vitamin D (ERGOCALCIFEROL) 50,000 Units, Every 30 Days      Objective     Vital Signs:   /60   Pulse 101   Ht 170.2 cm (67\")   Wt 78.9 kg (174 lb)   SpO2 92% Comment: RA  BMI 27.25 kg/m²   Physical Exam  Constitutional:       Appearance: Normal appearance. He is not ill-appearing, toxic-appearing or diaphoretic.   Eyes:      Extraocular Movements: Extraocular movements intact.   Pulmonary:      Effort: Pulmonary effort is normal. No respiratory distress.      Breath sounds: No wheezing, rhonchi or rales.   Skin:     Findings: No erythema or rash.   Neurological:      Mental Status: He is alert.        Result Review  Data Reviewed:    CT Chest Without Contrast Diagnostic    Result Date: 11/25/2024  Impression: 1. Ill-defined patchy areas of infiltrate/groundglass opacity/infiltrate in the lungs bilaterally may represent an evolving acute inflammatory/infectious process. These were not seen in the previous study. A follow-up examination in 3 months is recommended to ensure resolution. 2. A persistent and stable pleural-based masslike density/fibrosis in the left upper lung with intrinsic calcification and surrounding suture line.. 3. Low-density nodules in the liver and the kidneys which are incompletely visualized and evaluated in this study.             This report was signed and finalized on 11/25/2024 3:03 PM by Dr. Josué Corbett MD.       PFT Values          6/14/2023    09:00   Pre Drug PFT Results   FVC 90   FEV1 74   FEF 25-75% 44   FEV1/FVC 62                Assessment and Plan    Diagnoses and all orders for this visit:    1. Lung infiltrate (Primary)    2. 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 Recurrent carcinoma of left lung    4. Personal history of nicotine dependence    Continue nebs as needed for moderate copd  Oxygen for sat to stay >88  Continue mdi up to q6h for copd  Follow up ct in 3 months to reassess treated area and the area of infiltrate and reassessment of lung " cancer  Continue nonsmoking    Follow Up   Return in about 6 months (around 6/9/2025).  Patient was given instructions and counseling regarding his condition or for health maintenance advice. Please see specific information pulled into the AVS if appropriate.    Electronically signed by Camron Borjas MD, 12/9/2024, 21:52 CST

## 2025-01-08 ENCOUNTER — TELEPHONE (OUTPATIENT)
Dept: PULMONOLOGY | Facility: CLINIC | Age: 85
End: 2025-01-08
Payer: MEDICARE

## 2025-01-08 NOTE — TELEPHONE ENCOUNTER
Per wife patient does not appear to be in any distress. She said he is on 2L of oxygen at rest and it is running 84-88%. She did say his hands were cold and has poor circulation. She said he is not having a cough, congestion, fever or chills. No signs of swelling.     She is afraid he might be coming down with something and would like either to have an appointment tomorrow or have medications sent in.  Advised patient that the provider  is not in the office at this time and if this is an emergency then he needs to go to the ER.     Please advise.

## 2025-01-08 NOTE — TELEPHONE ENCOUNTER
Dr Borjas has openings tomorrow. I would bring him in for eval and rest/exercise on his current oxygen to see if it needs to be adjusted.

## 2025-01-08 NOTE — TELEPHONE ENCOUNTER
Caller: Francia Renteria    Relationship to patient: Emergency Contact    Best call back number: [phone]     Chief complaint: O2 IS RUNNING BETWEEN 84-88%, NO OTHER SYMPTOMS, BUT CONCERNED IT MAY BE THE BEGINNING OF SOMETHING.     Type of visit: FOLLOW UP    Requested date: 1/9/2025       Additional notes:IF UNABLE TO BE SEEN, THEY WANT TO KNOW IF THERE COULD BE STEROIDS AND MAYBE ANTIBIOTICS CALLED IT TO    [facility]

## 2025-01-09 ENCOUNTER — OFFICE VISIT (OUTPATIENT)
Dept: PULMONOLOGY | Facility: CLINIC | Age: 85
End: 2025-01-09
Payer: MEDICARE

## 2025-01-09 ENCOUNTER — HOSPITAL ENCOUNTER (OUTPATIENT)
Dept: GENERAL RADIOLOGY | Facility: HOSPITAL | Age: 85
Discharge: HOME | End: 2025-01-09
Admitting: INTERNAL MEDICINE
Payer: MEDICARE

## 2025-01-09 VITALS
SYSTOLIC BLOOD PRESSURE: 120 MMHG | DIASTOLIC BLOOD PRESSURE: 64 MMHG | WEIGHT: 181 LBS | OXYGEN SATURATION: 90 % | BODY MASS INDEX: 28.41 KG/M2 | HEIGHT: 67 IN | HEART RATE: 106 BPM

## 2025-01-09 DIAGNOSIS — J44.9 STAGE 2 MODERATE COPD BY GOLD CLASSIFICATION: ICD-10-CM

## 2025-01-09 DIAGNOSIS — J44.9 STAGE 2 MODERATE COPD BY GOLD CLASSIFICATION: Primary | ICD-10-CM

## 2025-01-09 DIAGNOSIS — R06.02 SHORTNESS OF BREATH: ICD-10-CM

## 2025-01-09 DIAGNOSIS — J96.11 CHRONIC RESPIRATORY FAILURE WITH HYPOXIA: ICD-10-CM

## 2025-01-09 PROCEDURE — 71046 X-RAY EXAM CHEST 2 VIEWS: CPT

## 2025-01-09 RX ORDER — ALBUTEROL SULFATE 0.83 MG/ML
2.5 SOLUTION RESPIRATORY (INHALATION) EVERY 4 HOURS PRN
COMMUNITY

## 2025-01-09 RX ORDER — ALBUTEROL SULFATE 90 UG/1
2 INHALANT RESPIRATORY (INHALATION) EVERY 4 HOURS PRN
Qty: 18 G | Refills: 11 | Status: SHIPPED | OUTPATIENT
Start: 2025-01-09

## 2025-01-09 RX ORDER — PREDNISONE 10 MG/1
TABLET ORAL
Qty: 21 TABLET | Refills: 0 | Status: SHIPPED | OUTPATIENT
Start: 2025-01-09

## 2025-01-09 NOTE — PROGRESS NOTES
Background:  Pt w lung ca wedge res 2020, local recurrence tx sbrt, gold 2 copd, hx RA, CAD CKD   Chief Complaint  COPD and Shortness of Breath    Subjective    History of Present Illness     Hoang Renteria is here for follow up with [facility] PULMONARY & CRITICAL CARE MEDICINE.  History of Present Illness  He has had some orthopnea  and worsened dyspnea on exertion for a few days.  He has had a cough that feels it comes from his throat.  He has a 5 L oxygen concentrator at home, uses a portable tank with conserving valve as well.     Tobacco Use: Medium Risk (1/9/2025)    Patient History     Smoking Tobacco Use: Former     Smokeless Tobacco Use: Former     Passive Exposure: Past      Current Outpatient Medications   Medication Instructions    albuterol (PROVENTIL) 2.5 mg, Every 4 Hours PRN    albuterol sulfate  (90 Base) MCG/ACT inhaler 2 puffs, Inhalation, Every 4 Hours PRN, Patient using 2 puffs twice a day    allopurinol (ZYLOPRIM) 100 mg, Daily    amoxicillin-clavulanate (AUGMENTIN) 875-125 MG per tablet 1 tablet, Oral, 2 Times Daily    clobetasol (TEMOVATE) 0.05 % cream 2 Times Daily    clopidogrel (PLAVIX) 75 mg, Daily    Cobalamine Combinations (B-12) 100-5000 MCG sublingual tablet Place  under the tongue.    colestipol (COLESTID) 1 g, Oral, 4 Times Daily    dilTIAZem CD (CARDIZEM CD) 120 MG 24 hr capsule 1 capsule, Daily    Eliquis 5 mg, 2 Times Daily    fluticasone (FLONASE) 50 MCG/ACT nasal spray 2 sprays, Daily    furosemide (LASIX) 40 MG tablet 1 tablet, Daily    gabapentin (NEURONTIN) 300 MG capsule Take 300 mg by mouth 2 times daily.    hydroxychloroquine (PLAQUENIL) 200 mg, Daily    nitroglycerin (NITROSTAT) 0.4 MG SL tablet Place 0.4 mg under the tongue every 5 minutes as needed.    Omega 3 1200 MG capsule 1 capsule    predniSONE (DELTASONE) 10 MG tablet Take 2 tabs daily for 7 days then 1 tab daily for 7 days    sodium bicarbonate 325 mg, 2 Times Daily    tamsulosin  "(FLOMAX) 0.4 MG capsule 24 hr capsule Take 0.4 mg by mouth daily.    vitamin D (ERGOCALCIFEROL) 50,000 Units, Every 30 Days      Objective     Vital Signs:   /64   Pulse 106   Ht 170.2 cm (67\")   Wt 82.1 kg (181 lb)   SpO2 90% Comment: 4L  BMI 28.35 kg/m²   Physical Exam  Constitutional:       Appearance: Normal appearance. He is not ill-appearing or diaphoretic.   Eyes:      Extraocular Movements: Extraocular movements intact.   Pulmonary:      Effort: Pulmonary effort is normal. No respiratory distress.      Breath sounds: No wheezing, rhonchi or rales.   Skin:     Findings: No erythema or rash.   Neurological:      Mental Status: He is alert.        Result Review  Data Reviewed:    XR Chest 2 View    Result Date: 1/9/2025  Impression: 1. Patchy bilateral nodular opacities appear progressed from the prior CT chest from 11/25/2024 although there are differences in modality. Repeat CT chest could be performed for further evaluation.  This report was signed and finalized on 1/9/2025 4:03 PM by Palmer Lewis.       PFT Values          6/14/2023    09:00   Pre Drug PFT Results   FVC 90   FEV1 74   FEF 25-75% 44   FEV1/FVC 62                Assessment and Plan    Diagnoses and all orders for this visit:    1. 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 Patient using 2 puffs twice a day  Dispense: 18 g; Refill: 11  -     Oxygen Therapy  -     predniSONE (DELTASONE) 10 MG tablet; Take 2 tabs daily for 7 days then 1 tab daily for 7 days  Dispense: 21 tablet; Refill: 0  -     XR Chest 2 View; Future    2. Shortness of breath  -     Walking Oximetry; Future  -     Walking Oximetry    3. Chronic respiratory failure with hypoxia  -     Oxygen Therapy  -     XR Chest 2 View; Future    Other orders  -     amoxicillin-clavulanate (AUGMENTIN) 875-125 MG per tablet; Take 1 tablet by mouth " 2 (Two) Times a Day.  Dispense: 14 tablet; Refill: 0    Will increase concentrator to 10 lpm  Add prednisone  Add antibiotic  Continue diuretics as already prescribed  cxr was ordered by me, and he went for this after he left the office.  I have reviewed this since then.  This shows the nodular changes for which she is not a candidate for invasive workup, no indicators for congestive heart failure.  I called them and discussed that with them later.  Will treat him with the prednisone antibiotic above continue oxygen at the higher flow.  Any additional escalation would require inpatient care.    Follow Up   Return in about 2 weeks (around 1/23/2025).  Patient was given instructions and counseling regarding his condition or for health maintenance advice. Please see specific information pulled into the AVS if appropriate.    Electronically signed by Camron Borjas MD, 1/9/2025, 17:31 CST

## 2025-01-09 NOTE — PROGRESS NOTES
Result discussed with pt. No indicator for CHF.  Nodules have been present long term and pt is not candidate for invasive testing.

## 2025-01-09 NOTE — PROCEDURES
Walking Oximetry    Performed by: Jaylin Villegas, RRT  Authorized by: Camron Borjas MD    Rest room air SAT %:  78  Rest on O2 @ Liters:  3 (4L 88%: 6L 88%; 8L 95%)  SAT %:  87  Exercise on O2 @ Liters:  8  SAT %:  86  2nd Exercise on O2 @ Liters:  10  SAT %: 94

## 2025-01-13 ENCOUNTER — PATIENT MESSAGE (OUTPATIENT)
Dept: PULMONOLOGY | Facility: CLINIC | Age: 85
End: 2025-01-13
Payer: MEDICARE

## 2025-01-23 ENCOUNTER — OFFICE VISIT (OUTPATIENT)
Dept: PULMONOLOGY | Facility: CLINIC | Age: 85
End: 2025-01-23
Payer: MEDICARE

## 2025-01-23 VITALS
BODY MASS INDEX: 28.41 KG/M2 | WEIGHT: 181 LBS | HEART RATE: 90 BPM | DIASTOLIC BLOOD PRESSURE: 62 MMHG | OXYGEN SATURATION: 91 % | SYSTOLIC BLOOD PRESSURE: 118 MMHG | HEIGHT: 67 IN

## 2025-01-23 DIAGNOSIS — J96.11 CHRONIC RESPIRATORY FAILURE WITH HYPOXIA: ICD-10-CM

## 2025-01-23 DIAGNOSIS — J44.9 STAGE 2 MODERATE COPD BY GOLD CLASSIFICATION: Primary | ICD-10-CM

## 2025-01-23 NOTE — PROCEDURES
Walking Oximetry    Performed by: Anita Pal MA  Authorized by: Kyung Rodriguez APRN    Rest room air SAT %:  93  Exercise room air SAT %:  83  Rest on O2 @ Liters:  2  SAT %:  95  Exercise on O2 @ Liters:  2  SAT %:  87  2nd Exercise on O2 @ Liters:  3  SAT %: 97  3rd Exercise on O2 @ Liters: 3  SAT %: 89  4LT rest 98  4LT-exercise 95      This was on Pulse Dosing.

## 2025-01-23 NOTE — PROGRESS NOTES
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 Renteria is a 84 year old male patient of Dr. Borjas with known COPD, RA, coronary artery disease, chronic kidney disease. He is a former smoker. History of lung cancer.     He was seen by Dr. Borjas a couple of weeks ago and prescribed antibiotics and steroids.  He has completed treatment.  Vit A 2 of treatment he was feeling improved.     A chest x-ray was also performed without signs of CHF.  his oxygen levels were adjusted during his last visit with Dr. Borjas, initially set at 8 at rest to 10 liters per minute with exertion, but have now been reduced to 2 liters per minute. He monitors his oxygen saturation at home, typically measuring it frequently.  His oxygen saturation remains around 90 to 91% when he is at rest, such as while watching television or sitting indoors without supplemental oxygen. However, it can decrease to 86 to 87% if he does not engage in deep breathing exercises. He reports that his oxygen saturation tends to drop during car rides, likely due to shallow breathing, but it increases again upon deeper breathing. He manages to maintain his oxygen saturation within the low 90s range for most of the day without supplemental oxygen, even while being active. After exertion, his oxygen saturation ranges between 89 to 91%. He uses supplemental oxygen at night at 2 L.  He reports no fevers, chills, or night sweats.         Objective   Vital Signs:   /62   Pulse 90   Ht 170.2 cm (67\")   Wt 82.1 kg (181 lb)   SpO2 91% Comment: 2 LT O2  BMI 28.35 kg/m²     Physical Exam  Vitals reviewed.   Constitutional:       " Appearance: Normal appearance.   Cardiovascular:      Rate and Rhythm: Normal rate and regular rhythm.   Pulmonary:      Effort: Pulmonary effort is normal.      Breath sounds: Normal breath sounds.   Neurological:      General: No focal deficit present.      Mental Status: He is alert and oriented to person, place, and time.   Psychiatric:         Mood and Affect: Mood normal.         Behavior: Behavior normal.          Result Review :  The following data was reviewed by: CITLALLI Negrete on 01/23/2025:    Data reviewed : Radiologic studies CXR    My interpretation of imaging:  as above   My interpretation of labs: none  XR Chest 2 View (01/09/2025 15:52)   PFT Values          6/14/2023    09:00   Pre Drug PFT Results   FVC 90   FEV1 74   FEF 25-75% 44   FEV1/FVC 62     My interpretation of the PFT : no new     Results for orders placed in visit on 06/14/23    Pulmonary Function Test    Narrative  Pulmonary Function Test  Performed by: Jaylin Villegas, RRT  Authorized by: Camron Borjas MD    Pre Drug % Predicted  FVC: 90%  FEV1: 74%  FEF 25-75%: 44%  FEV1/FVC: 62%    Interpretation  Spirometry  Spirometry shows moderate obstruction. There is reduced midflow suggesting small airway/airflow obstruction.  Overall comments: Stable compared with 2020    .esign      Results for orders placed in visit on 02/04/20    Pulmonary Function Test    Narrative  Pulmonary Function Test  Performed by: Camron Borjas MD  Authorized by: Camron Borjas MD    Pre Drug  FVC: 87%  FEV1: 76%  FEF 25-75%: 42%  FEV1/FVC: 67.35%  DLCO: 55%  D/VAsb: 52%    Rest/Exercise Pulse Ox Values          1/9/2025    14:45 1/23/2025    09:00   Rest/Exercise Pulse Ox Results   Rest room air SAT % 78 93   Exercise room air SAT %  83   Rest on O2 @ Liters 3       4L 88%: 6L 88%; 8L 95% 2   Rest on O2 SAT % 87 95   Exercise on O2 @ Liters 8 2   Exercise on O2 SAT % 86 87     Walking Oximetry 1/23/2025     Performed by:  Anita aPl MA  Authorized by: Kyung Rodriguez APRN    Rest room air SAT %:  93  Exercise room air SAT %:  83  Rest on O2 @ Liters:  2  SAT %:  95  Exercise on O2 @ Liters:  2  SAT %:  87  2nd Exercise on O2 @ Liters:  3  SAT %: 97  3rd Exercise on O2 @ Liters: 3  SAT %: 89  4LT rest 98  4LT-exercise 95        This was on Pulse Dosing.    Assessment and Plan   Diagnoses and all orders for this visit:    1. 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 Chronic respiratory failure with hypoxia  -     Walking Oximetry      Overall he is improved.  He notes that by day 2 of steroids and antibiotics he was feeling improved and able to decrease his oxygen supplementation significantly.  He was ambulated again today and dropped to 83% on room air.  He was titrated and at 4 L pulsed dosing with exertion he was 95%.  He will need 2 L at rest and 4 L with exertion.  I will discuss with Dr. Borjas and we will reach out to [facility] and make order adjustments.      Hoang Renteria  reports that he quit smoking about 4 years ago. His smoking use included cigarettes. He started smoking about 70 years ago. He has a 65 pack-year smoking history. He has been exposed to tobacco smoke. He has quit using smokeless tobacco.              LDCT: Does not qualify   Smoking Cessation: Former smoker, quit 2020  Vaccinations: Flu: Completed PNA:  Completed        Follow Up   No follow-ups on file.  Patient was given instructions and counseling regarding his condition or for health maintenance advice. Please see specific information pulled into the AVS if appropriate.     CITLALLI Negrete  1/23/2025  10:13 CST    Please note that portions of this note were completed with a voice recognition program.    Patient or patient representative verbalized consent for the use of Ambient Listening during the visit with  CITLALLI Negrete for chart documentation. 1/23/2025  10:13 CST

## 2025-01-24 ENCOUNTER — PATIENT MESSAGE (OUTPATIENT)
Dept: PULMONOLOGY | Facility: CLINIC | Age: 85
End: 2025-01-24
Payer: MEDICARE

## 2025-02-26 NOTE — PROGRESS NOTES
[facility]  Radiation Oncology Clinic   Luis Felipe Kohli MD, FACR  Charliesejal Francis CITLALLI  _______________________________________________  [facility]  Department of Radiation Oncology  [address] [place] [id number]  Office: [phone]  Fax: [phone]    DATE: 03/03/2025  PATIENT: Hoang Renteria  1940                         MEDICAL RECORD #: [id number]    1. Recurrent carcinoma of left lung    2. 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 Status post lobectomy of lung    4. History of radiation therapy    5. Former smoker    6. Malignant neoplasm of overlapping sites of right bronchus and lung                                              REASON FOR VISIT:    Chief Complaint   Patient presents with    Lung Cancer     SANDRO SBRT EOT 05-  5000 cGy     Reason for Follow up Visit:  Hoang Renteria is a very pleasant 84 y.o. patient that completed radiation to the lung and returns to the clinic today for routine follow up exam.     History of Present Illness:    06/22/2018 - CT Lung screening (Annual):  Lung rads category 4A-there are several foci of nodularity within the lung. The largest of the nodules measure 6 mm in size in the the right lower lobe.   There is also a focus of nodularity within the inferior lingular segment of the left upper lobe which radiographically has the appearance of atelectasis and/or scarring.   The nodular component does measure approximately 1.5 cm in long axis and I feel would categorize this patient into a 4a category.   Follow-up low dose CT in 3 months is recommended to assure stability of this particular finding.     09/24/2018 - CT Lung screening (Annual):  Lung rads category   Overall stable appearance of the nodules from the previous study of 6/22/2018 except for interval diminishment in size of a focus of atelectasis/scarring within the lingular segment of the left upper lobe.   No new nodules  are present.   Follow-up low dose CT imaging in 12 months is recommended.     01/16/2020 - CT Chest with and without contrast:  Left upper lobe with a 14 x 7 mm pulmonary nodule, previously 6 mm on 9/24/2018.   Right lower lobe with 7 mm pleural-based pulmonary nodule, previously 7 mm on 9/24/2018.   Impression:  Increased size of left upper lobe pulmonary nodule now measuring 14 x 7 mm, previously 6 mm on 9/24/2018. This is concerning for malignancy. Consider PET/CT or tissue sampling for further evaluation.   No lymphadenopathy.   Coronary artery, aortic and branch vessel atherosclerosis.     01/24/2020 - PET Scan:  Left upper lobe pulmonary nodule, only mildly hypermetabolic with a maximum density of 1.87 SUV, equivocal for malignancy. Follow-up recommended.   No scintigraphic evidence of hypermetabolic neoplastic disease.     02/04/2020 - Pulmonary Function tests:  FVC - 87   FEV1 - 76   FEF 25-75% - 42   FEV1/FVC - 67.35   DLCO - 55   D/VAsb - 52     02/04/2020 - Appointment with :  The series of images is reviewed.   I am very concerned about the progressive growth, despite the equivocal pet result, with risk for lung cancer.   Refer to CT surgery for eval for wedge +/- lobectomy. FEV1 ok, although DLCO is reduced.I am giving him a sample of anoro and we showed him how to use it. I do not know why epic did not embed that action under plan above, but I can't get the program to do that.   Recommend smoking abstinence.   On anticoagulation which will need to be held prior to surgery.    03/31/2020 - Lung, left upper lobe wedge resection - per :  Moderately differentiated lung adenocarcinoma, margins negative.   Carcinoma measures 1.3 cm in greatest dimension.   Negative for evidence of pleural invasion.   Surgical excision margins are negative for evidence of malignancy.   Mild subpleural emphysematous changes involving nonneoplastic lung parenchyma.   AJCC STAGE: pT1a, pNx, pMx     05/06/2020  - Appointment with :  We will plan follow-up in September with repeat chest CT as well as pulmonary function testing then.  We will try to see him for a physical real visit in the office.    Unable to do physical exam over the phone other than just listening to his voice.    His voice is strong and able to complete full sentences and is in good spirits and good humor.    08/31/2020 - CT Chest without contrast:  Postoperative change of thoracotomy with LEFT upper lobe wedge resection.   No evidence of residual or metastatic disease.   There is nodular thickening along the wedge resection line which is likely postoperative scarring but recommend continued imaging surveillance.     09/08/2020 - Appointment with :  Patient seems to be stable from an oncology standpoint.  Latest CT of the chest at the end of last month at [facility] is negative.  We will continue to follow him with intermittent imaging.  He did have a wedge resection.  COPD is at least moderate based on his latest spirometry.    He has not responded well to other inhalers in the past.    I do have some Bevespi samples and will give him a trial of that, 2 puffs twice a day.  Coronavirus avoidance.    We will follow him up with spirometry in a few months.    We will get follow-up imaging next year as well.    We will order that when he comes back in.    03/09/2021 - Appointment with :  Will plan follow up ct. Pt gets all his films at [facility], so we will order it there and will need to get a disc to review. I told him to make sure we discuss result over phone.   Discussed potential for data transfer failure between computer systems at the 2 institutions.   Follow up in 6 months with spirometry. Continue mobilization as much as he can.   Could benefit from pulm rehab once coronavirus pandemic improves.    03/16/2021 - CT Chest without contrast:  Soft tissue nodule adjacent to suture material in the left upper lobe is concerning  for recurrent disease.   Multiple renal lesions, incompletely characterized on this exam. Follow-up nonemergent renal ultrasound recommended.   Atherosclerosis of the aorta and coronary arteries.    03/19/2021 - Appointment with :  We discussed the findings and reviewed the film Primary and most substantial concern is possible recurrence of lung cancer in patient with prior smoking history.  Other possibilities include hypertrophic scarring at surgical site, reaction to coronavirus vaccine (doubt, but brought up by pt), granuloma or other benign disease, metastatic lesion from renal lesions (also doubtful).   Will plan PET scan.   If abnormal and suggestive of malignancy, and no other sites identified, then he is a candidate for stereotactic radiosurgery.   If negative or equivocal, then ongoing follow up could be considered as an alternative.   Tissue diagnosis would likely require robotic bronchoscopy.   COPD is stable    03/31/2021 - PET Scan:  An 18 mm nodule in the left upper lobe demonstrates an SUV of 5.9.  Review of the mediastinum reveals no hypermetabolic lymphadenopathy.     04/07/2021 - Appointment with :  Following this discussion and in consideration of the diagnostic data/evaluation of the patient, I recommended a course of stereotactic radiosurgery to the SANDRO nodule, will simulate treatment fields today, 3D CT with MIPS to begin the planning process, final course pending.    04/27/2021 - 05/07/2021 - Completed Radiation course:  Received 5000 cGy in 4 fractions to left upper lobe of lung via stereotactic radiosurgery.    05/19/2021 - Appointment with :  Return in about 3 months (around 8/19/2021).    08/02/2021 - CT Chest:  Decreased soft tissue component along the left upper lobe suture  line with probable postradiation changes.   Stable right lower lobe 8 mm pulmonary nodule compared to 3/16/2021.   Heavily calcified coronary arteries versus stent.     08/09/2021 -  Appointment with :  Your scan looks great, you are in remission!   Return to Dr. Bruner in 3 months with a CT scan before.     10/27/2021 - CT chest without contrast:  Increasing left upper and superior segment left lower lobe opacities compared to 8/2/2021 which may represent sequelae of radiation. Superimposed pneumonia considered. Similar postoperative changes of the left upper lung. Stable 8 mm subpleural right lower lobe nodule. No new dominant nodules identified.   Left coronary artery stents and/or calcifications.     11/10/2021 - Appointment with :  CT-scan of the chest on 10/26/2021 revealed increasing left upper and superior segment left lower lobe opacities compared to 8/2/2021 which may represent sequelae of radiation. Superimposed pneumonia considered. Similar postoperative changes of the left upper lung. Stable 8 mm subpleural right lower lobe nodule. No new dominant nodules identified. Left coronary artery stents and/or calcifications.   I have ordered a PET scan for further evaluation of the increased left upper lobe opacities noted on the CT scan. We will continue routine follow-up/surveillance as discussed in 3 months with follow up CT scan before visit and I have instructed him to continue to see the other health care providers as per their scheduling.  Plan:  We will order a PET scan, they will call you to schedule.  Otherwise we will see you back in 3 months with a CT chest before.     11/12/2021 - PET Scan:  There is diffuse uptake in the left upper lobe and superior segment left lower lobe that corresponds to infiltrates seen on recent CT. The SUV max is 4.3. Residual tumor in this area cannot be ruled out. Most of the uptake is likely infectious/inflammatory.  No evidence of any distant metastatic disease.    02/07/2022 - CT Chest with contrast:  Scarlike infiltrate with focal calcification in the left upper lobe is slightly smaller now and postradiation changes favored.  No  new lung abnormality.    02/10/2022 - Appointment with :  Follow up in 3 months     03/11/2022 - CT Abdomen/Pelvis with contrast:  1.5 cm left lower renal pole enhancing mass is most concerning for renal cell carcinoma.   Asymmetric soft tissue density in the posterior dome of the bladder wall. Although the bladder isn't very distended, underlying opacity is not excluded.     05/05/2022 - CT Chest with contrast:  Decrease in masslike consolidation in the LEFT upper lobe along the wedge resection suture line. Favor posttreatment change but recommend continued imaging surveillance/follow-up.   No evidence of metastatic disease in the chest.    05/11/2022 - Appointment with :  Follow up in 4 months with Chest CT    06/13/2022 - Appointment with :  Follow up in one year     09/29/2022 - CT chest with contrast:  Stable appearance of the chest from previous study 5/5/2022. No radiographic evidence of localized recurrence or metastatic disease.  Scarring within the periphery of the left upper lobe with involvement of the adjacent superior segment of the left lower lobe with associated pleural thickening and calcification. This is felt to represent post therapeutic change adjacent to a staple line and is stable from the previous exam. No new or developing pulmonary nodules are present. No developing mediastinal, hilar or axillary adenopathy.    10/06/2022 - Appointment with :  Follow up in 4 months     02/03/2023 - CT Chest with contrast:  No evidence of recurrent or metastatic disease. Stable posttreatment change in the LEFT upper chest.  There is a single new 4 mm pulmonary nodule in the RIGHT upper lobe. Favor this to be related to an infectious or inflammatory process but recommend follow-up imaging to confirm resolution.    02/08/2023 - Appointment with :  Return in 3 months with a CT chest before    05/08/2023 - CT Chest with contrast:  No evidence of recurrent or metastatic  disease. Stable post treatment change in the LEFT upper chest.  No change in the 4 mm RIGHT upper lobe pulmonary nodule that was new on the prior study. No new or enlarging pulmonary nodule    05/10/2023 - Appointment with :  Return in 6 months with a CT chest before    06/07/2023 - CT Abdomen/pelvis with contrast:  Enhancing mass in the left lower renal pole suspicious for neoplasm.  Size currently measures 2.1 cm, previously reported as 1.5 cm.   Nephrolithiasis.  No hydronephrosis.   Extensive atherosclerosis. Coronary calcifications.   Mildly thickened bladder wall, likely associated with chronic outlet obstruction.  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     11/07/2023 - CT Chest with contrast:  LEFT kidney with a 1.5 cm lesion which appears to be enhancing. This is concerning for renal cell carcinoma until proven otherwise. There are also 2 indeterminate LEFT renal lesions in the field-of-view. Recommend CT abdomen pelvis without and with contrast (renal mass protocol) for further evaluation.  There are a few scattered tiny pulmonary nodules and groundglass opacities as described above, which could be infectious/inflammatory. Similar posttreatment changes to the LEFT lung. Recommend follow-up CT chest in 3 months or per oncology.  Heavily calcified coronary arteries versus stent material. 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    11/13/2023 - Appointment with :  Follow up in 3 months     11/22/2023 - Urinary bladder, biopsy:   Benign urothelial mucosa with moderate chronic inflammation and changes consistent with cystitis glandularis.     02/02/2024 - CT chest with contrast:  Stable pleural-based masslike opacity in the left upper lobe is likely posttreatment related.  Several solid nodules bilaterally are stable. There has been interval development of a few groundglass and solid nodules bilaterally as described above. These may be infectious in etiology however  continued attention on follow-up is recommended.  Subcentimeter groundglass nodules in the right upper lobe on the prior examination have resolved.    02/02/2024 - CT Abdomen/Pelvis with contrast:  Exophytic mass at the mid pole of the left kidney represents a hyperdense cyst. There are multiple other bilateral renal cysts, vascular calcifications, and probable nonobstructing renal stones.  No acute abnormality identified. There is no CT evidence for metastatic disease.    02/05/2024 - Appointment with :  Return in 6 months with a CT chest before     07/15/2024 - Presented to ER with complaints of shortness of breath.     07/16/2024 - CT Chest without contrast:  Bilateral dependent pleural effusions, moderate on the right and small on the left, with mild pulmonary edema of both lower lungs.   Bilateral pneumonia as described.  Follow-up CT chest 1 month is recommended.   6.1 cm mass-like consolidation superimposed on suture material in the left upper lobe, probably at least partially due to postradiation therapy fibrosis.  Residual or recurrent mass cannot be excluded.  Correlation with prior studies, if available, might be helpful.  Attention to this finding in the follow-up scan 1 month.   Mild mediastinal and bilateral axillary adenopathy, potentially reactive or secondary to old granulomatous disease.  Metastatic disease cannot be excluded.   Moderate emphysema.     07/20/2024 - Discharged from [facility] with follow up appointments scheduled.     08/23/2024 - CT chest with contrast:  New findings worrisome for progression of disease, with several new nodules in the lungs, the largest is seen in the left lower lobe and measures 1.3 cm PET/CT may be helpful for follow-up.  No intrathoracic lymphadenopathy is identified.  Stable pleural parenchymal fibrosis in the left upper hemithorax probably related to previous radiation therapy.    08/23/2024 - Appointment with :  Follow up in 3  months    09/11/2024 - Appointment with :  Will plan await follow up ct to reassess nodules, particularly the one in LLL  Cannot stop plavix at this point after stent per Dr. Ireland in 5/2024, so we could not do bronchoscopy anyway now.  Notably also on eliquis which would have to be held for invasive procedure.  Follow back up after next ct.   Follow Up   Return in about 11 weeks (around 11/27/2024).    11/25/2024 - CT Chest without contrast:  Ill-defined patchy areas of infiltrate/groundglass opacity/infiltrate in the lungs bilaterally may represent an evolving acute inflammatory/infectious process. These were not seen in the previous study. A follow-up examination in 3 months is recommended to ensure resolution.  A persistent and stable pleural-based masslike density/fibrosis in the left upper lung with intrinsic calcification and surrounding suture line.  Low-density nodules in the liver and the kidneys which are incompletely visualized and evaluated in this study.     12/02/2024 - Appointment with CITLALLI Currie - Radiation oncology:  On exam, I do not see evidence for recurrent or metastatic disease at this time.   We will continue routine follow-up/surveillance as discussed in 3 months with follow up CT scan before visit and I have instructed him to continue to see the other health care providers as per their scheduling.    02/27/2025 - CT Chest without contrast:  Significant increase in number of the lesions/nodules, too numerous to count since the previous study. Some of the referenced  lung nodules which were not present in the previous study are measured. Possibility of a metastatic disease may not be excluded. There is stable pleural-based mass in the left upper lobe with intrinsic calcification may represent an area of scarring/fibrosis. No change. Moderate nonspecific right axillary lymphadenopathy. No evidence of mediastinal lymphadenopathy.  Moderate cardiomegaly.  Chronic emphysematous  lung changes. Pulmonary arterial hypertension.    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 2020      PAST SURGICAL HISTORY  Past Surgical History:   Procedure Laterality Date    CHOLECYSTECTOMY  2022    COLONOSCOPY  2016    three polyps  all removed    COLONOSCOPY N/A 2019    Procedure: COLONOSCOPY WITH ANESTHESIA;  Surgeon: Reddy Ann DO;  Location:  [facility];  Service: Gastroenterology    COLONOSCOPY N/A 2023    Procedure: COLONOSCOPY WITH ANESTHESIA;  Surgeon: Reddy Ann DO;  Location:  [facility];  Service: Gastroenterology;  Laterality: N/A;  Pre: Diarrhea, History of adenomatous polyp of colon  Post: polyp  Laura Weston MD    ENDOSCOPY N/A 2022    Procedure: ESOPHAGOGASTRODUODENOSCOPY WITH ANESTHESIA;  Surgeon: Reddy Ann DO;  Location: [facility];  Service: Gastroenterology;  Laterality: N/A;  Pre: Dysphagia  Post: Duodenitis  Laura Weston MD    HERNIA REPAIR      LUNG BIOPSY        FAMILY HISTORY  family history includes Cancer in his mother; Colon polyps in his sister.    SOCIAL HISTORY  Social History     Tobacco Use    Smoking status: Former     Current packs/day: 0.00     Average packs/day: 1 pack/day for 65.0 years (65.0 ttl pk-yrs)     Types: Cigarettes     Start date: 2/10/1955     Quit date: 2/10/2020     Years since quittin.0     Passive exposure: Past    Smokeless tobacco: Former   Vaping Use    Vaping status: Never Used   Substance Use Topics    Alcohol use: Not Currently    Drug use: No     ALLERGIES  Ipratropium and Sulfamethoxazole-trimethoprim     MEDICATIONS    Current Outpatient Medications:     albuterol  (PROVENTIL) (2.5 MG/3ML) 0.083% nebulizer solution, Take 2.5 mg by nebulization Every 4 (Four) Hours As Needed for Wheezing., Disp: , Rfl:     albuterol sulfate  (90 Base) MCG/ACT inhaler, Inhale 2 puffs Every 4 (Four) Hours As Needed for Wheezing. Patient using 2 puffs twice a day, Disp: 18 g, Rfl: 11    allopurinol (ZYLOPRIM) 100 MG tablet, Take 1 tablet by mouth Daily., Disp: , Rfl:     clobetasol (TEMOVATE) 0.05 % cream, Apply  topically to the appropriate area as directed 2 (Two) Times a Day., Disp: , Rfl:     clopidogrel (PLAVIX) 75 MG tablet, Take 1 tablet by mouth Daily., Disp: , Rfl:     Cobalamine Combinations (B-12) 100-5000 MCG sublingual tablet, Place  under the tongue., Disp: , Rfl:     colestipol (COLESTID) 1 g tablet, Take 1 tablet by mouth 4 (Four) Times a Day., Disp: 120 tablet, Rfl: 11    dilTIAZem CD (CARDIZEM CD) 120 MG 24 hr capsule, Take 1 capsule by mouth Daily., Disp: , Rfl:     Eliquis 5 MG tablet tablet, Take 1 tablet by mouth 2 (Two) Times a Day., Disp: , Rfl:     fluticasone (FLONASE) 50 MCG/ACT nasal spray, Administer 2 sprays into the nostril(s) as directed by provider Daily., Disp: , Rfl:     furosemide (LASIX) 40 MG tablet, Take 1 tablet by mouth Daily., Disp: , Rfl:     gabapentin (NEURONTIN) 300 MG capsule, Take 300 mg by mouth 2 times daily., Disp: , Rfl:     hydroxychloroquine (PLAQUENIL) 200 MG tablet, Take 1 tablet by mouth Daily., Disp: , Rfl:     nitroglycerin (NITROSTAT) 0.4 MG SL tablet, , Disp: , Rfl:     Omega 3 1200 MG capsule, Take 1 capsule by mouth., Disp: , Rfl:     sodium bicarbonate 325 MG tablet, Take 1 tablet by mouth 2 (Two) Times a Day. 2 tabs in am, 1 tab in pm, Disp: , Rfl:     tamsulosin (FLOMAX) 0.4 MG capsule 24 hr capsule, Take 0.4 mg by mouth daily., Disp: , Rfl:     vitamin D (ERGOCALCIFEROL) 1.25 MG (15408 UT) capsule capsule, Take 1 capsule by mouth Every 30 (Thirty) Days., Disp: , Rfl:     Current outpatient and discharge medications have been  "reconciled for the patient.  Reviewed by: CITLALLI Smith    The following portions of the patient's history were reviewed and updated as appropriate: allergies, current medications, past family history, past medical history, past social history, past surgical history and problem list.    REVIEW OF SYSTEMS  Review of Systems   Constitutional: Negative.    HENT: Negative.     Respiratory:  Positive for cough and shortness of breath.    Cardiovascular: Negative.         No pacemaker/defibrillator   Gastrointestinal: Negative.    Endocrine:        No CGM   Genitourinary: Negative.    Musculoskeletal: Negative.    Skin: Negative.    Neurological: Negative.    Hematological: Negative.    Psychiatric/Behavioral: Negative.     All other systems reviewed and are negative.    PHYSICAL EXAM  VITAL SIGNS:   Vitals:    03/03/25 0833   BP: 134/61   Pulse: 99   Resp: 24   SpO2: 92%   Weight: 82.1 kg (181 lb)   Height: 170.2 cm (67\")   PainSc: 0-No pain     Physical Exam  Vitals reviewed.   Constitutional:       Appearance: Normal appearance.   HENT:      Head: Normocephalic.      Nose: Nose normal.   Eyes:      Pupils: Pupils are equal, round, and reactive to light.   Cardiovascular:      Rate and Rhythm: Normal rate and regular rhythm.      Pulses: Normal pulses.      Heart sounds: Normal heart sounds.   Pulmonary:      Effort: Pulmonary effort is normal. No respiratory distress.      Breath sounds: Normal breath sounds. No wheezing.   Abdominal:      General: Bowel sounds are normal.      Palpations: There is no mass.   Musculoskeletal:         General: Normal range of motion.      Cervical back: Normal range of motion and neck supple. No tenderness.   Lymphadenopathy:      Cervical: No cervical adenopathy.   Skin:     General: Skin is warm and dry.      Capillary Refill: Capillary refill takes less than 2 seconds.   Neurological:      General: No focal deficit present.      Mental Status: He is alert and oriented to " person, place, and time.      Motor: No weakness.   Psychiatric:         Mood and Affect: Mood normal.         Behavior: Behavior normal.        Performance Status: ECOG (2) Ambulatory and capable of self care, unable to carry out work activity, up and about > 50% or waking hours    Clinical Quality Measures  - Pain Documented by Standardized Tool, FPS Hoang Renteria reports a pain score of 0.  Given his pain assessment as noted, treatment options were discussed and the following options were decided upon as a follow-up plan to address the patient's pain: continuation of current treatment plan for pain.  Pain Medications               gabapentin (NEURONTIN) 300 MG capsule Take 300 mg by mouth 2 times daily.          - Body Mass Index Screening and Follow-Up Plan Body mass index is 28.35 kg/m².     - Tobacco Use: Screening and Cessation Intervention  Social History    Tobacco Use      Smoking status: Former        Packs/day: 0.00        Years: 1 pack/day for 65.0 years (65.0 ttl pk-yrs)        Types: Cigarettes        Start date: 2/10/1955        Quit date: 2/10/2020        Years since quittin.0        Passive exposure: Past      Smokeless tobacco: Former    - Advanced Care Planning Advance Care Planning   ACP discussion was held with the patient during this visit. Patient does not have an advance directive, information provided.    - PHQ-2 Depression Screening  Little interest or pleasure in doing things? Not at all   Feeling down, depressed, or hopeless? Not at all   PHQ-2 Total Score 0     ASSESSMENT AND PLAN  1. Recurrent carcinoma of left lung    2. 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 Status post lobectomy of lung    4. History of radiation therapy    5. Former smoker    6. Malignant neoplasm of overlapping sites of right bronchus and lung      Orders Placed This Encounter   Procedures    NM Pet Skull Base To Mid Thigh     Standing Status:   Future     Standing Expiration Date:   3/3/2026      Order Specific Question:   What radiopharmaceutical is preferred for this exam?     Answer:   FDG  (offered at all sites)     Order Specific Question:   Reason for Exam:     Answer:   lung cancer     Order Specific Question:   Release to patient     Answer:   Routine Release [[id number]]    MRI Brain With & Without Contrast     Standing Status:   Future     Standing Expiration Date:   3/3/2026     Order Specific Question:   Will fiducial markers be needed for this procedure?     Answer:   No     Order Specific Question:   Reason for Exam:     Answer:   lung cancer     Order Specific Question:   Release to patient     Answer:   Routine Release [[id number]]     RECOMMENDATIONS: Hoang Renteria is status post completion of radiation therapy to the lung and presents to our clinic today for surveillance exam and to review imaging.  Diagnosed in 2020 with Adenocarcinoma of the lung, SANDRO. Underwent SANDRO wedge resection on 03/31/2020, pathology revealed  margins negative. Negative for PNI.   03/16/2021 CT Chest: SANDRO nodularity increased. He completed 5000 cGy in 4 fractions to the left upper lobe on 05/07/2021.     CT-scan of the chest on 02/27/2025 revealed significant increase in number of the lesions/nodules, too numerous to count since the previous study. Some of the referenced  lung nodules which were not present in the previous study are measured. Possibility of a metastatic disease may not be excluded. There is stable pleural-based mass in the left upper lobe with intrinsic calcification may represent an area of scarring/fibrosis. No change. Moderate nonspecific right axillary lymphadenopathy. No evidence of mediastinal lymphadenopathy. Moderate cardiomegaly. Chronic emphysematous lung changes. Pulmonary arterial hypertension.  .     We discussed that these findings could be indicative of metastatic disease. I discussed his CT findings with Dr. Bruner, I will order a brain MRI and PET scan to evaluate for metastatic  disease.    Will order PET scan and brain MRI for further evaluation. I will discuss the CT findings with pulmonology for further recommendations. I have instructed him to continue to see the other health care providers as per their scheduling.     Patient Instructions   1) will discuss your CT scan with Dr. Borjas and Ellie  2) Will order PET scan and brain MRI for further evaluation.     No follow-ups on file.    Time Spent: I spent 44 minutes caring for Hoang on this date of service. This time includes time spent by me in the following activities: preparing for the visit, reviewing tests, obtaining and/or reviewing a separately obtained history, performing a medically appropriate examination and/or evaluation, counseling and educating the patient/family/caregiver, ordering medications, tests, or procedures, referring and communicating with other health care professionals, documenting information in the medical record, independently interpreting results and communicating that information with the patient/family/caregiver, and care coordination.   Charlie Francis, APRN  03/03/2025

## 2025-02-27 ENCOUNTER — HOSPITAL ENCOUNTER (OUTPATIENT)
Dept: CT IMAGING | Facility: HOSPITAL | Age: 85
Discharge: HOME | End: 2025-02-27
Payer: MEDICARE

## 2025-02-27 DIAGNOSIS — Z87.891 FORMER SMOKER: ICD-10-CM

## 2025-02-27 DIAGNOSIS — Z90.2 STATUS POST LOBECTOMY OF LUNG: ICD-10-CM

## 2025-02-27 DIAGNOSIS — Z85.118 HISTORY OF PRIMARY NON-SMALL CELL CARCINOMA OF LEFT LUNG: ICD-10-CM

## 2025-02-27 DIAGNOSIS — Z92.3 HISTORY OF RADIATION THERAPY: ICD-10-CM

## 2025-02-27 DIAGNOSIS — J44.9 STAGE 2 MODERATE COPD BY GOLD CLASSIFICATION: ICD-10-CM

## 2025-02-27 PROCEDURE — 71250 CT THORAX DX C-: CPT

## 2025-03-02 ENCOUNTER — DOCUMENTATION (OUTPATIENT)
Age: 85
End: 2025-03-02
Payer: MEDICARE

## 2025-03-02 NOTE — PROGRESS NOTES
I have reviewed this patient's recent CT scan.  He has increasing size and the number of multiple pulmonary nodules.  Although these are not typical in appearance for metastatic disease I am certainly suspicious this represents metastatic disease.  We will request a PET scan and referral back to Dr. Borjas for consideration of biopsy and tissue confirmation.  Will also obtain an MRI of the brain to rule out evidence of any brain metastases.

## 2025-03-03 ENCOUNTER — OFFICE VISIT (OUTPATIENT)
Age: 85
End: 2025-03-03
Payer: MEDICARE

## 2025-03-03 ENCOUNTER — HOSPITAL ENCOUNTER (OUTPATIENT)
Dept: RADIATION ONCOLOGY | Facility: HOSPITAL | Age: 85
Setting detail: RADIATION/ONCOLOGY SERIES
End: 2025-03-03
Payer: MEDICARE

## 2025-03-03 VITALS
OXYGEN SATURATION: 92 % | HEART RATE: 99 BPM | RESPIRATION RATE: 24 BRPM | HEIGHT: 67 IN | DIASTOLIC BLOOD PRESSURE: 61 MMHG | BODY MASS INDEX: 28.41 KG/M2 | SYSTOLIC BLOOD PRESSURE: 134 MMHG | WEIGHT: 181 LBS

## 2025-03-03 DIAGNOSIS — J44.9 STAGE 2 MODERATE COPD BY GOLD CLASSIFICATION: ICD-10-CM

## 2025-03-03 DIAGNOSIS — C34.92 RECURRENT CARCINOMA OF LEFT LUNG: Primary | ICD-10-CM

## 2025-03-03 DIAGNOSIS — C34.81 MALIGNANT NEOPLASM OF OVERLAPPING SITES OF RIGHT BRONCHUS AND LUNG: ICD-10-CM

## 2025-03-03 DIAGNOSIS — Z87.891 FORMER SMOKER: ICD-10-CM

## 2025-03-03 DIAGNOSIS — Z90.2 STATUS POST LOBECTOMY OF LUNG: ICD-10-CM

## 2025-03-03 DIAGNOSIS — Z92.3 HISTORY OF RADIATION THERAPY: ICD-10-CM

## 2025-03-03 PROCEDURE — G0463 HOSPITAL OUTPT CLINIC VISIT: HCPCS | Performed by: RADIOLOGY

## 2025-03-03 NOTE — PATIENT INSTRUCTIONS
1) will discuss your CT scan with Dr. Borjas and Ellie  2) Will order PET scan and brain MRI for further evaluation.

## 2025-03-06 ENCOUNTER — PREP FOR SURGERY (OUTPATIENT)
Dept: OTHER | Facility: HOSPITAL | Age: 85
End: 2025-03-06
Payer: MEDICARE

## 2025-03-06 ENCOUNTER — TELEPHONE (OUTPATIENT)
Dept: PULMONOLOGY | Facility: CLINIC | Age: 85
End: 2025-03-06

## 2025-03-06 ENCOUNTER — HOSPITAL ENCOUNTER (OUTPATIENT)
Dept: CT IMAGING | Facility: HOSPITAL | Age: 85
Discharge: HOME | End: 2025-03-06
Payer: MEDICARE

## 2025-03-06 ENCOUNTER — HOSPITAL ENCOUNTER (OUTPATIENT)
Dept: MRI IMAGING | Facility: HOSPITAL | Age: 85
Discharge: HOME | End: 2025-03-06
Payer: MEDICARE

## 2025-03-06 ENCOUNTER — OFFICE VISIT (OUTPATIENT)
Dept: PULMONOLOGY | Facility: CLINIC | Age: 85
End: 2025-03-06
Payer: MEDICARE

## 2025-03-06 VITALS
HEART RATE: 93 BPM | HEIGHT: 67 IN | BODY MASS INDEX: 28.41 KG/M2 | DIASTOLIC BLOOD PRESSURE: 78 MMHG | SYSTOLIC BLOOD PRESSURE: 122 MMHG | OXYGEN SATURATION: 93 % | WEIGHT: 181 LBS

## 2025-03-06 DIAGNOSIS — J44.9 STAGE 2 MODERATE COPD BY GOLD CLASSIFICATION: ICD-10-CM

## 2025-03-06 DIAGNOSIS — C34.92 RECURRENT CARCINOMA OF LEFT LUNG: ICD-10-CM

## 2025-03-06 DIAGNOSIS — Z85.118 PERSONAL HISTORY OF LUNG CANCER: ICD-10-CM

## 2025-03-06 DIAGNOSIS — Z90.2 STATUS POST LOBECTOMY OF LUNG: ICD-10-CM

## 2025-03-06 DIAGNOSIS — J96.11 CHRONIC RESPIRATORY FAILURE WITH HYPOXIA: ICD-10-CM

## 2025-03-06 DIAGNOSIS — Z92.3 HISTORY OF RADIATION THERAPY: ICD-10-CM

## 2025-03-06 DIAGNOSIS — J96.11 CHRONIC RESPIRATORY FAILURE WITH HYPOXIA: Chronic | ICD-10-CM

## 2025-03-06 DIAGNOSIS — Z87.891 FORMER SMOKER: ICD-10-CM

## 2025-03-06 DIAGNOSIS — R91.8 MULTIPLE LUNG NODULES: Primary | ICD-10-CM

## 2025-03-06 DIAGNOSIS — C34.81 MALIGNANT NEOPLASM OF OVERLAPPING SITES OF RIGHT BRONCHUS AND LUNG: ICD-10-CM

## 2025-03-06 DIAGNOSIS — R91.8 MULTIPLE LUNG NODULES: ICD-10-CM

## 2025-03-06 PROBLEM — R06.02 SHORTNESS OF BREATH: Status: RESOLVED | Noted: 2020-02-04 | Resolved: 2025-03-06

## 2025-03-06 LAB — CREAT BLDA-MCNC: 1.8 MG/DL (ref 0.6–1.3)

## 2025-03-06 PROCEDURE — 71250 CT THORAX DX C-: CPT

## 2025-03-06 PROCEDURE — 78815 PET IMAGE W/CT SKULL-THIGH: CPT

## 2025-03-06 PROCEDURE — 70553 MRI BRAIN STEM W/O & W/DYE: CPT

## 2025-03-06 PROCEDURE — 34310000005 FLUDEOXYGLUCOSE F18 SOLUTION

## 2025-03-06 PROCEDURE — 82565 ASSAY OF CREATININE: CPT

## 2025-03-06 PROCEDURE — 25510000001 GADOPICLENOL 0.5 MMOL/ML SOLUTION

## 2025-03-06 PROCEDURE — A9552 F18 FDG: HCPCS

## 2025-03-06 PROCEDURE — 99214 OFFICE O/P EST MOD 30 MIN: CPT | Performed by: NURSE PRACTITIONER

## 2025-03-06 PROCEDURE — A9579 GAD-BASE MR CONTRAST NOS,1ML: HCPCS

## 2025-03-06 RX ORDER — SODIUM CHLORIDE 0.9 % (FLUSH) 0.9 %
10 SYRINGE (ML) INJECTION EVERY 12 HOURS SCHEDULED
OUTPATIENT
Start: 2025-03-06

## 2025-03-06 RX ORDER — SODIUM CHLORIDE 9 MG/ML
40 INJECTION, SOLUTION INTRAVENOUS AS NEEDED
OUTPATIENT
Start: 2025-03-06

## 2025-03-06 RX ORDER — SODIUM CHLORIDE 0.9 % (FLUSH) 0.9 %
10 SYRINGE (ML) INJECTION AS NEEDED
OUTPATIENT
Start: 2025-03-06

## 2025-03-06 RX ORDER — ATORVASTATIN CALCIUM 10 MG/1
1 TABLET, FILM COATED ORAL DAILY
COMMUNITY
Start: 2025-03-05

## 2025-03-06 RX ADMIN — FLUDEOXYGLUCOSE F18 1 DOSE: 300 INJECTION INTRAVENOUS at 11:47

## 2025-03-06 RX ADMIN — GADOPICLENOL 8 ML: 485.1 INJECTION INTRAVENOUS at 11:06

## 2025-03-06 NOTE — H&P (VIEW-ONLY)
Office Visit  3/6/2025  [facility] PULMONARY & CRITICAL CARE MEDICINE       Kyung Rodriguez APRN  Nurse Practitioner Multiple lung nodules +5 more  Dx Stage 2 Moderate COPD  Reason for Visit     Progress Notes  Kyung Rodriguez APRN (Nurse Practitioner)  Nurse Practitioner  Expand All Collapse All   CITLALLI Negrete  [facility]   Pulmonary and Critical Care  [address] [place] [address]  Phone: [phone]  Fax: [phone]               Chief Complaint  Stage 2 Moderate COPD        Subjective  History of Present Illness      Hoang Renteria presents to [facility] PULMONARY & CRITICAL CARE MEDICINE      History of Present Illness  Mr. Renteria is a 84 year old male patient of Dr. Borjas with known COPD, RA, coronary artery disease, chronic kidney disease. He is a former smoker. History of lung cancer.      Patient underwent left upper lobe wedge resection March 31, 2020 by Dr. 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  He was seen in January by Dr. Borjas for COPD exacerbation treated with prednisone and antibiotic as well as increase in his supplemental oxygen.  He then followed up with myself on January 23 at which time he noted the medication improved his symptoms.  He was continued on 2 L of oxygen at rest and 4 L with exertion.  His follow-up CT from oncology showed multiple new nodules for which radiation oncologist, Dr. Bruner has asked for biopsy.  He is to have his PET scan and MRI of the brain today.     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      He is currently on a regimen of Plavix and Eliquis, prescribed by Dr. Ireland, for cardiac stents that were placed in July 2024. During a recent consultation with Dr. Ireland, he was advised to discontinue Plavix 4 days prior to the ion biopsy and Eliquis 2 days before the procedure. He has already taken  "his doses of these medications today.           Objective  Vital Signs:   /78   Pulse 93   Ht 170.2 cm (67\")   Wt 82.1 kg (181 lb)   SpO2 93% Comment: 3L  BMI 28.35 kg/m²     Physical Exam  Vitals reviewed.   Constitutional:       Appearance: Normal appearance.      Interventions: Nasal cannula in place.   Cardiovascular:      Rate and Rhythm: Normal rate and regular rhythm.   Pulmonary:      Effort: Pulmonary effort is normal.      Breath sounds: Normal breath sounds.   Neurological:      General: No focal deficit present.      Mental Status: He is alert and oriented to person, place, and time.   Psychiatric:         Mood and Affect: Mood normal.         Behavior: Behavior normal.                  Result Review  :  The following data was reviewed by: CITLALLI Negrete on 03/06/2025:     Data reviewed : Radiologic studies Ct chest     My interpretation of imaging:  as below   My interpretation of labs: none  CT Chest Without Contrast Diagnostic (02/27/2025 08:07)   IMPRESSION:  1. Significant increase in number of the lesions/nodules, too numerous  to count since the previous study. Some of the referenced  lung nodules  which were not present in the previous study are measured. Possibility  of a metastatic disease may not be excluded.  2. There is stable pleural-based mass in the left upper lobe with  intrinsic calcification may represent an area of scarring/fibrosis. No  change.  3. Moderate nonspecific right axillary lymphadenopathy.  4. No evidence of mediastinal lymphadenopathy.  5. Moderate cardiomegaly.  6. Chronic emphysematous lung changes. Pulmonary arterial hypertension.  PFT Values            6/14/2023    09:00   Pre Drug PFT Results   FVC 90   FEV1 74   FEF 25-75% 44   FEV1/FVC 62      My interpretation of the PFT : no new      Results for orders placed in visit on 06/14/23     Pulmonary Function Test     Narrative  Pulmonary Function Test  Performed by: Jaylin Villegas, " RRT  Authorized by: Camron Borjas MD     Pre Drug % Predicted  FVC: 90%  FEV1: 74%  FEF 25-75%: 44%  FEV1/FVC: 62%     Interpretation  Spirometry  Spirometry shows moderate obstruction. There is reduced midflow suggesting small airway/airflow obstruction.  Overall comments: Stable compared with 2020     .esign        Results for orders placed in visit on 02/04/20     Pulmonary Function Test     Narrative  Pulmonary Function Test  Performed by: Camron Borjas MD  Authorized by: Camron Borjas MD     Pre Drug  FVC: 87%  FEV1: 76%  FEF 25-75%: 42%  FEV1/FVC: 67.35%  DLCO: 55%  D/VAsb: 52%     Rest/Exercise Pulse Ox Values            1/9/2025    14:45 1/23/2025    09:00   Rest/Exercise Pulse Ox Results   Rest room air SAT % 78 93   Exercise room air SAT %   83   Rest on O2 @ Liters 3       4L 88%: 6L 88%; 8L 95% 2   Rest on O2 SAT % 87 95   Exercise on O2 @ Liters 8 2   Exercise on O2 SAT % 86 87            Assessment and Plan   Diagnoses and all orders for this visit:     1. Multiple lung nodules (Primary)  Comments:  Multiple new nodules noted on CT 2/27/2025.     2. 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 Chronic respiratory failure with hypoxia  Overview:  DME: [facility]  2L rest, 4L exertion         4. Status post lobectomy of lung     5. Personal history of lung cancer  Overview:  Wedge resection 3/2020        6. History of radiation therapy        I have reviewed his imaging with he and his family member.  Patient reports today that he feels like he is at his baseline.  He continues to be on supplemental oxygen at 2L at rest and 3 to 4 L with exertion.  I have reviewed risks, benefits and alternatives.  Patient was seen by his cardiologist yesterday who verbally advised them they could stop the Plavix 4 days before the procedure and the Eliquis 2 days before the procedure.  We will get written confirmation from Dr. Ireland.  Ion robotic video was shown to them as well.  We will plan a  diffusion capacity today to reevaluate.  His last FEV1 was 74% predicted in June 2023.  His last diffusion capacity in February 2020 when corrected for alveolar volume was 52%.        Hoang Renteria  reports that he quit smoking about 5 years ago. His smoking use included cigarettes. He started smoking about 70 years ago. He has a 65 pack-year smoking history. He has been exposed to tobacco smoke. He has quit using smokeless tobacco.                  LDCT: Does not qualify   Smoking Cessation: Former smoker, quit 2020  Vaccinations: Flu: Completed         PNA:   Completed     Follow Up   No follow-ups on file.  Patient was given instructions and counseling regarding his condition or for health maintenance advice. Please see specific information pulled into the AVS if appropriate.      CITLALLI Negrete  3/6/2025  09:10 CST     Please note that portions of this note were completed with a voice recognition program.     Patient or patient representative verbalized consent for the use of Ambient Listening during the visit with  CITLALLI Negrete for chart documentation. 3/6/2025  09:12 CST                Contains text generated by [employer]

## 2025-03-06 NOTE — PROGRESS NOTES
" CITLALLI Negrete  [facility]   Pulmonary and Critical Care  [address]  [place] [address]  Phone: [phone]  Fax: [phone]           Chief Complaint  Stage 2 Moderate COPD    Subjective    History of Present Illness     Hoang Renteria presents to [facility] PULMONARY & CRITICAL CARE MEDICINE     History of Present Illness  Mr. Renteria is a 84 year old male patient of Dr. Borjas with known COPD, RA, coronary artery disease, chronic kidney disease. He is a former smoker. History of lung cancer.     Patient underwent left upper lobe wedge resection March 31, 2020 by Dr. 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  He was seen in January by Dr. Borjas for COPD exacerbation treated with prednisone and antibiotic as well as increase in his supplemental oxygen.  He then followed up with myself on January 23 at which time he noted the medication improved his symptoms.  He was continued on 2 L of oxygen at rest and 4 L with exertion.  His follow-up CT from oncology showed multiple new nodules for which radiation oncologist, Dr. Bruner has asked for biopsy.  He is to have his PET scan and MRI of the brain today.    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     He is currently on a regimen of Plavix and Eliquis, prescribed by Dr. Ireland, for cardiac stents that were placed in July 2024. During a recent consultation with Dr. Ireland, he was advised to discontinue Plavix 4 days prior to the ion biopsy and Eliquis 2 days before the procedure. He has already taken his doses of these medications today.      Objective   Vital Signs:   /78   Pulse 93   Ht 170.2 cm (67\")   Wt 82.1 kg (181 lb)   SpO2 93% Comment: 3L  BMI 28.35 kg/m²     Physical Exam  Vitals reviewed.   Constitutional:       Appearance: Normal appearance.      Interventions: Nasal cannula in place.   Cardiovascular:      Rate " and Rhythm: Normal rate and regular rhythm.   Pulmonary:      Effort: Pulmonary effort is normal.      Breath sounds: Normal breath sounds.   Neurological:      General: No focal deficit present.      Mental Status: He is alert and oriented to person, place, and time.   Psychiatric:         Mood and Affect: Mood normal.         Behavior: Behavior normal.             Result Review :  The following data was reviewed by: CITLALLI Negrete on 03/06/2025:    Data reviewed : Radiologic studies Ct chest     My interpretation of imaging:  as below   My interpretation of labs: none  CT Chest Without Contrast Diagnostic (02/27/2025 08:07)   IMPRESSION:  1. Significant increase in number of the lesions/nodules, too numerous  to count since the previous study. Some of the referenced  lung nodules  which were not present in the previous study are measured. Possibility  of a metastatic disease may not be excluded.  2. There is stable pleural-based mass in the left upper lobe with  intrinsic calcification may represent an area of scarring/fibrosis. No  change.  3. Moderate nonspecific right axillary lymphadenopathy.  4. No evidence of mediastinal lymphadenopathy.  5. Moderate cardiomegaly.  6. Chronic emphysematous lung changes. Pulmonary arterial hypertension.  PFT Values          6/14/2023    09:00 3/6/2025    08:30   Pre Drug PFT Results   FVC 90 90   FEV1 74 80   FEF 25-75% 44 56   FEV1/FVC 62 66   Other Tests PFT Results   DLCO  34   D/VAsb  42     My interpretation of the PFT : no new     Results for orders placed in visit on 03/06/25    Spirometry with Diffusion Capacity    Narrative  Spirometry with Diffusion Capacity    Performed by: Jaylin Villegas, RRT  Authorized by: Kyung Rodriguez APRN  Pre Drug % Predicted  FVC: 90%  FEV1: 80%  FEF 25-75%: 56%  FEV1/FVC: 66%  DLCO: 34%  D/VAsb: 42%    Interpretation  Spirometry  Spirometry shows mild obstruction. There is reduced midflow suggesting small  airway/airflow obstruction.  Review of FVL curve  Patient's effort is normal.  Diffusion Capacity  The patient's diffusion capacity is severely reduced.  Diffusion capacity is moderately reduced when corrected for alveolar volume.  Overall comments: Compared to June 2023 FEV1 has improved from 74 to 80% predicted.  Diffusion capacity is compared to February 2020 and there has been a decrease from 52 to 42% predicted when corrected for alveolar volume.      Results for orders placed in visit on 06/14/23    Pulmonary Function Test    Narrative  Pulmonary Function Test  Performed by: Jaylin Villegas, RRT  Authorized by: Camron Borjas MD    Pre Drug % Predicted  FVC: 90%  FEV1: 74%  FEF 25-75%: 44%  FEV1/FVC: 62%    Interpretation  Spirometry  Spirometry shows moderate obstruction. There is reduced midflow suggesting small airway/airflow obstruction.  Overall comments: Stable compared with 2020    .esign      Results for orders placed in visit on 02/04/20    Pulmonary Function Test    Narrative  Pulmonary Function Test  Performed by: Camron Borjas MD  Authorized by: Camron Borjas MD    Pre Drug  FVC: 87%  FEV1: 76%  FEF 25-75%: 42%  FEV1/FVC: 67.35%  DLCO: 55%  D/VAsb: 52%    Rest/Exercise Pulse Ox Values          1/9/2025    14:45 1/23/2025    09:00   Rest/Exercise Pulse Ox Results   Rest room air SAT % 78 93   Exercise room air SAT %  83   Rest on O2 @ Liters 3       4L 88%: 6L 88%; 8L 95% 2   Rest on O2 SAT % 87 95   Exercise on O2 @ Liters 8 2   Exercise on O2 SAT % 86 87         Assessment and Plan   Diagnoses and all orders for this visit:    1. Multiple lung nodules (Primary)  Comments:  Multiple new nodules noted on CT 2/27/2025.    2. 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 Chronic respiratory failure with hypoxia  Overview:  DME: [facility]  2L rest, 4L exertion       4. Status post lobectomy of lung    5. Personal history of lung  cancer  Overview:  Wedge resection 3/2020      6. History of radiation therapy      I have reviewed his imaging with he and his family member.  Patient reports today that he feels like he is at his baseline.  He continues to be on supplemental oxygen at 2L at rest and 3 to 4 L with exertion.  I have reviewed risks, benefits and alternatives.  Patient was seen by his cardiologist yesterday who verbally advised them they could stop the Plavix 4 days before the procedure and the Eliquis 2 days before the procedure.  We will get written confirmation from Dr. Ireland.  Ion robotic video was shown to them as well.  We will plan a diffusion capacity today to reevaluate.  His last FEV1 was 74% predicted in June 2023.  His last diffusion capacity in February 2020 when corrected for alveolar volume was 52%.    Addendum: PFT today showed mild obstruction with an FEV1 of 80% predicted and a moderately decreased diffusion capacity when corrected for alveolar volume.  NM PET/CT Skull Base to Mid Thigh (03/06/2025 12:17)   PET scan continued to show numerous bilateral nodules which have increased in size and number.  Somewhat ill-defined with some having surrounding groundglass opacity.  Some nodules are mildly hypermetabolic.  Favor infectious inflammatory process but metastatic disease is a possibility and consider tissue sampling.  MRI Brain With & Without Contrast (03/06/2025 11:05)   No metastatic disease identified    PFTs and PET scan reviewed with Dr. Borjas.  We also discussed his Plavix and Eliquis use.  Would prefer him to be off of the Plavix for 5 to 7 days.  He is already taken his Plavix and Eliquis today.  He will hold that on Friday until after the procedure.  Approval received from Dr. Ireland's office.  He will hold his Eliquis on Sunday and Monday and resume after the procedure.  Approval received from Dr. Ireland's office.  Also discussed with Dr. Borjas placing him on 81 mg aspirin for Monday and Tuesday.  Patient will  be elevated risk of bleeding.  CITLALLI Bahena with radiation oncology has been updated as well.  He will get his preop work done on Tuesday.  Patient's wife has been updated as well.    Hoang Renteria  reports that he quit smoking about 5 years ago. His smoking use included cigarettes. He started smoking about 70 years ago. He has a 65 pack-year smoking history. He has been exposed to tobacco smoke. He has quit using smokeless tobacco.              LDCT: Does not qualify   Smoking Cessation: Former smoker, quit 2020  Vaccinations: Flu: Completed         PNA:   Completed    Follow Up   Return in about 8 days (around 3/14/2025).  Patient was given instructions and counseling regarding his condition or for health maintenance advice. Please see specific information pulled into the AVS if appropriate.     CITLALLI Negrete  3/6/2025  15:45 CST    Please note that portions of this note were completed with a voice recognition program.    Patient or patient representative verbalized consent for the use of Ambient Listening during the visit with  CITLALLI Negrete for chart documentation. 3/6/2025  09:12 CST

## 2025-03-06 NOTE — PROCEDURES
Spirometry with Diffusion Capacity    Performed by: Jaylin Villegas, RRT  Authorized by: Kyung Rodriguez APRN     Pre Drug % Predicted    FVC: 90%   FEV1: 80%   FEF 25-75%: 56%   FEV1/FVC: 66%   DLCO: 34%   D/VAsb: 42%    Interpretation   Spirometry   Spirometry shows mild obstruction. There is reduced midflow suggesting small airway/airflow obstruction.   Review of FVL curve   Patient's effort is normal.   Diffusion Capacity  The patient's diffusion capacity is severely reduced.  Diffusion capacity is moderately reduced when corrected for alveolar volume.   Overall comments: Compared to June 2023 FEV1 has improved from 74 to 80% predicted.  Diffusion capacity is compared to February 2020 and there has been a decrease from 52 to 42% predicted when corrected for alveolar volume.

## 2025-03-06 NOTE — TELEPHONE ENCOUNTER
Patient is scheduled for Bronchoscopy with [facility] on Tuesday 03/11/25 to follow your first case.

## 2025-03-06 NOTE — H&P
Office Visit  3/6/2025  [facility] PULMONARY & CRITICAL CARE MEDICINE       Kyung Rodriguez APRN  Nurse Practitioner Multiple lung nodules +5 more  Dx Stage 2 Moderate COPD  Reason for Visit     Progress Notes  Kyung Rodriguez APRN (Nurse Practitioner)  Nurse Practitioner  Expand All Collapse All   CITLALLI Negrete  [facility]   Pulmonary and Critical Care  [address] [place] [address]  Phone: [phone]  Fax: [phone]               Chief Complaint  Stage 2 Moderate COPD        Subjective  History of Present Illness      Hoang Renteria presents to [facility] PULMONARY & CRITICAL CARE MEDICINE      History of Present Illness  Mr. Renteria is a 84 year old male patient of Dr. Borjas with known COPD, RA, coronary artery disease, chronic kidney disease. He is a former smoker. History of lung cancer.      Patient underwent left upper lobe wedge resection March 31, 2020 by Dr. 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  He was seen in January by Dr. Borjas for COPD exacerbation treated with prednisone and antibiotic as well as increase in his supplemental oxygen.  He then followed up with myself on January 23 at which time he noted the medication improved his symptoms.  He was continued on 2 L of oxygen at rest and 4 L with exertion.  His follow-up CT from oncology showed multiple new nodules for which radiation oncologist, Dr. Bruner has asked for biopsy.  He is to have his PET scan and MRI of the brain today.     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      He is currently on a regimen of Plavix and Eliquis, prescribed by Dr. Ireland, for cardiac stents that were placed in July 2024. During a recent consultation with Dr. Ireland, he was advised to discontinue Plavix 4 days prior to the ion biopsy and Eliquis 2 days before the procedure. He has already taken  "his doses of these medications today.           Objective  Vital Signs:   /78   Pulse 93   Ht 170.2 cm (67\")   Wt 82.1 kg (181 lb)   SpO2 93% Comment: 3L  BMI 28.35 kg/m²     Physical Exam  Vitals reviewed.   Constitutional:       Appearance: Normal appearance.      Interventions: Nasal cannula in place.   Cardiovascular:      Rate and Rhythm: Normal rate and regular rhythm.   Pulmonary:      Effort: Pulmonary effort is normal.      Breath sounds: Normal breath sounds.   Neurological:      General: No focal deficit present.      Mental Status: He is alert and oriented to person, place, and time.   Psychiatric:         Mood and Affect: Mood normal.         Behavior: Behavior normal.                  Result Review  :  The following data was reviewed by: CITLALLI Negrete on 03/06/2025:     Data reviewed : Radiologic studies Ct chest     My interpretation of imaging:  as below   My interpretation of labs: none  CT Chest Without Contrast Diagnostic (02/27/2025 08:07)   IMPRESSION:  1. Significant increase in number of the lesions/nodules, too numerous  to count since the previous study. Some of the referenced  lung nodules  which were not present in the previous study are measured. Possibility  of a metastatic disease may not be excluded.  2. There is stable pleural-based mass in the left upper lobe with  intrinsic calcification may represent an area of scarring/fibrosis. No  change.  3. Moderate nonspecific right axillary lymphadenopathy.  4. No evidence of mediastinal lymphadenopathy.  5. Moderate cardiomegaly.  6. Chronic emphysematous lung changes. Pulmonary arterial hypertension.  PFT Values            6/14/2023    09:00   Pre Drug PFT Results   FVC 90   FEV1 74   FEF 25-75% 44   FEV1/FVC 62      My interpretation of the PFT : no new      Results for orders placed in visit on 06/14/23     Pulmonary Function Test     Narrative  Pulmonary Function Test  Performed by: Jaylin Villegas, " RRT  Authorized by: Camron Borjas MD     Pre Drug % Predicted  FVC: 90%  FEV1: 74%  FEF 25-75%: 44%  FEV1/FVC: 62%     Interpretation  Spirometry  Spirometry shows moderate obstruction. There is reduced midflow suggesting small airway/airflow obstruction.  Overall comments: Stable compared with 2020     .esign        Results for orders placed in visit on 02/04/20     Pulmonary Function Test     Narrative  Pulmonary Function Test  Performed by: Camron Borjas MD  Authorized by: Camron Borjas MD     Pre Drug  FVC: 87%  FEV1: 76%  FEF 25-75%: 42%  FEV1/FVC: 67.35%  DLCO: 55%  D/VAsb: 52%     Rest/Exercise Pulse Ox Values            1/9/2025    14:45 1/23/2025    09:00   Rest/Exercise Pulse Ox Results   Rest room air SAT % 78 93   Exercise room air SAT %   83   Rest on O2 @ Liters 3       4L 88%: 6L 88%; 8L 95% 2   Rest on O2 SAT % 87 95   Exercise on O2 @ Liters 8 2   Exercise on O2 SAT % 86 87            Assessment and Plan   Diagnoses and all orders for this visit:     1. Multiple lung nodules (Primary)  Comments:  Multiple new nodules noted on CT 2/27/2025.     2. 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 Chronic respiratory failure with hypoxia  Overview:  DME: [facility]  2L rest, 4L exertion         4. Status post lobectomy of lung     5. Personal history of lung cancer  Overview:  Wedge resection 3/2020        6. History of radiation therapy        I have reviewed his imaging with he and his family member.  Patient reports today that he feels like he is at his baseline.  He continues to be on supplemental oxygen at 2L at rest and 3 to 4 L with exertion.  I have reviewed risks, benefits and alternatives.  Patient was seen by his cardiologist yesterday who verbally advised them they could stop the Plavix 4 days before the procedure and the Eliquis 2 days before the procedure.  We will get written confirmation from Dr. Ireland.  Ion robotic video was shown to them as well.  We will plan a  diffusion capacity today to reevaluate.  His last FEV1 was 74% predicted in June 2023.  His last diffusion capacity in February 2020 when corrected for alveolar volume was 52%.        Hoang Renteria  reports that he quit smoking about 5 years ago. His smoking use included cigarettes. He started smoking about 70 years ago. He has a 65 pack-year smoking history. He has been exposed to tobacco smoke. He has quit using smokeless tobacco.                  LDCT: Does not qualify   Smoking Cessation: Former smoker, quit 2020  Vaccinations: Flu: Completed         PNA:   Completed     Follow Up   No follow-ups on file.  Patient was given instructions and counseling regarding his condition or for health maintenance advice. Please see specific information pulled into the AVS if appropriate.      CITLALLI Negrete  3/6/2025  09:10 CST     Please note that portions of this note were completed with a voice recognition program.     Patient or patient representative verbalized consent for the use of Ambient Listening during the visit with  CITLALLI Negrete for chart documentation. 3/6/2025  09:12 CST                Contains text generated by [employer]

## 2025-03-06 NOTE — TELEPHONE ENCOUNTER
Spouse informed to hold Eliquis and Plavix as instructed at office visit on 03/06/25.  Also informed for patient to take ASA 81 mg on Monday 03/10 and Tuesday 03/11 as per Dr. Borjas's instructions.  Spouse voiced understanding.      Cardiac Risk Assessment received by Dr. Ireland/CITLALLI Valdez and in media for review.

## 2025-03-07 NOTE — SIGNIFICANT NOTE
Home Rx;  Plavix; per physician instructions, stopped; last dose taken was 03/06/2025, pm dose.   Eliquis; per physician instructions, stopped; last dose taken to be Saturday, 3/8/25.

## 2025-03-11 ENCOUNTER — ANESTHESIA EVENT (OUTPATIENT)
Dept: PERIOP | Facility: HOSPITAL | Age: 85
End: 2025-03-11
Payer: MEDICARE

## 2025-03-11 ENCOUNTER — TELEPHONE (OUTPATIENT)
Dept: PULMONOLOGY | Facility: CLINIC | Age: 85
End: 2025-03-11
Payer: MEDICARE

## 2025-03-11 ENCOUNTER — HOSPITAL ENCOUNTER (OUTPATIENT)
Facility: HOSPITAL | Age: 85
Setting detail: HOSPITAL OUTPATIENT SURGERY
Discharge: HOME | End: 2025-03-11
Attending: INTERNAL MEDICINE | Admitting: INTERNAL MEDICINE
Payer: MEDICARE

## 2025-03-11 ENCOUNTER — APPOINTMENT (OUTPATIENT)
Dept: GENERAL RADIOLOGY | Facility: HOSPITAL | Age: 85
End: 2025-03-11
Payer: MEDICARE

## 2025-03-11 ENCOUNTER — ANESTHESIA (OUTPATIENT)
Dept: PERIOP | Facility: HOSPITAL | Age: 85
End: 2025-03-11
Payer: MEDICARE

## 2025-03-11 VITALS
DIASTOLIC BLOOD PRESSURE: 69 MMHG | SYSTOLIC BLOOD PRESSURE: 120 MMHG | OXYGEN SATURATION: 88 % | RESPIRATION RATE: 22 BRPM | BODY MASS INDEX: 28.44 KG/M2 | HEART RATE: 75 BPM | WEIGHT: 181.22 LBS | HEIGHT: 67 IN | TEMPERATURE: 97.3 F

## 2025-03-11 DIAGNOSIS — Z85.118 PERSONAL HISTORY OF LUNG CANCER: ICD-10-CM

## 2025-03-11 DIAGNOSIS — R91.8 MULTIPLE LUNG NODULES: ICD-10-CM

## 2025-03-11 DIAGNOSIS — Z92.3 HISTORY OF RADIATION THERAPY: ICD-10-CM

## 2025-03-11 DIAGNOSIS — J96.11 CHRONIC RESPIRATORY FAILURE WITH HYPOXIA: ICD-10-CM

## 2025-03-11 LAB
ANION GAP SERPL CALCULATED.3IONS-SCNC: 12 MMOL/L (ref 5–15)
BUN SERPL-MCNC: 36 MG/DL (ref 8–23)
BUN/CREAT SERPL: 20.1 (ref 7–25)
CALCIUM SPEC-SCNC: 9.1 MG/DL (ref 8.6–10.5)
CHLORIDE SERPL-SCNC: 107 MMOL/L (ref 98–107)
CO2 SERPL-SCNC: 21 MMOL/L (ref 22–29)
CREAT SERPL-MCNC: 1.79 MG/DL (ref 0.76–1.27)
DEPRECATED RDW RBC AUTO: 48.9 FL (ref 37–54)
EGFRCR SERPLBLD CKD-EPI 2021: 37.1 ML/MIN/1.73
ERYTHROCYTE [DISTWIDTH] IN BLOOD BY AUTOMATED COUNT: 16.7 % (ref 12.3–15.4)
GLUCOSE SERPL-MCNC: 93 MG/DL (ref 65–99)
HCT VFR BLD AUTO: 42.4 % (ref 37.5–51)
HGB BLD-MCNC: 13.7 G/DL (ref 13–17.7)
MCH RBC QN AUTO: 26.1 PG (ref 26.6–33)
MCHC RBC AUTO-ENTMCNC: 32.3 G/DL (ref 31.5–35.7)
MCV RBC AUTO: 80.8 FL (ref 79–97)
PLATELET # BLD AUTO: 173 10*3/MM3 (ref 140–450)
PMV BLD AUTO: 12.3 FL (ref 6–12)
POTASSIUM SERPL-SCNC: 4.8 MMOL/L (ref 3.5–5.2)
RBC # BLD AUTO: 5.25 10*6/MM3 (ref 4.14–5.8)
SODIUM SERPL-SCNC: 140 MMOL/L (ref 136–145)
WBC NRBC COR # BLD AUTO: 11.09 10*3/MM3 (ref 3.4–10.8)

## 2025-03-11 PROCEDURE — 76380 CAT SCAN FOLLOW-UP STUDY: CPT

## 2025-03-11 PROCEDURE — 85027 COMPLETE CBC AUTOMATED: CPT | Performed by: INTERNAL MEDICINE

## 2025-03-11 PROCEDURE — 31628 BRONCHOSCOPY/LUNG BX EACH: CPT | Performed by: INTERNAL MEDICINE

## 2025-03-11 PROCEDURE — 80048 BASIC METABOLIC PNL TOTAL CA: CPT | Performed by: INTERNAL MEDICINE

## 2025-03-11 PROCEDURE — 76000 FLUOROSCOPY <1 HR PHYS/QHP: CPT

## 2025-03-11 PROCEDURE — 88177 CYTP FNA EVAL EA ADDL: CPT | Performed by: INTERNAL MEDICINE

## 2025-03-11 PROCEDURE — 25010000002 DEXAMETHASONE PER 1 MG: Performed by: NURSE ANESTHETIST, CERTIFIED REGISTERED

## 2025-03-11 PROCEDURE — 25010000002 VASOPRESSIN 20 UNIT/ML SOLUTION: Performed by: NURSE ANESTHETIST, CERTIFIED REGISTERED

## 2025-03-11 PROCEDURE — 31652 BRONCH EBUS SAMPLNG 1/2 NODE: CPT | Performed by: INTERNAL MEDICINE

## 2025-03-11 PROCEDURE — 31632 BRONCHOSCOPY/LUNG BX ADDL: CPT | Performed by: INTERNAL MEDICINE

## 2025-03-11 PROCEDURE — C1726 CATH, BAL DIL, NON-VASCULAR: HCPCS | Performed by: INTERNAL MEDICINE

## 2025-03-11 PROCEDURE — 71045 X-RAY EXAM CHEST 1 VIEW: CPT

## 2025-03-11 PROCEDURE — 88305 TISSUE EXAM BY PATHOLOGIST: CPT | Performed by: INTERNAL MEDICINE

## 2025-03-11 PROCEDURE — 25010000002 SUGAMMADEX 200 MG/2ML SOLUTION: Performed by: NURSE ANESTHETIST, CERTIFIED REGISTERED

## 2025-03-11 PROCEDURE — 25810000003 LACTATED RINGERS PER 1000 ML: Performed by: ANESTHESIOLOGY

## 2025-03-11 PROCEDURE — 25010000002 LIDOCAINE PF 2% 2 % SOLUTION: Performed by: NURSE ANESTHETIST, CERTIFIED REGISTERED

## 2025-03-11 PROCEDURE — 25010000002 HYDROCORTISONE SOD SUC (PF) 250 MG RECONSTITUTED SOLUTION: Performed by: NURSE ANESTHETIST, CERTIFIED REGISTERED

## 2025-03-11 PROCEDURE — 31627 NAVIGATIONAL BRONCHOSCOPY: CPT | Performed by: INTERNAL MEDICINE

## 2025-03-11 PROCEDURE — 88112 CYTOPATH CELL ENHANCE TECH: CPT | Performed by: INTERNAL MEDICINE

## 2025-03-11 PROCEDURE — 31629 BRONCHOSCOPY/NEEDLE BX EACH: CPT | Performed by: INTERNAL MEDICINE

## 2025-03-11 PROCEDURE — 31633 BRONCHOSCOPY/NEEDLE BX ADDL: CPT | Performed by: INTERNAL MEDICINE

## 2025-03-11 PROCEDURE — 25010000002 ONDANSETRON PER 1 MG: Performed by: NURSE ANESTHETIST, CERTIFIED REGISTERED

## 2025-03-11 PROCEDURE — 88172 CYTP DX EVAL FNA 1ST EA SITE: CPT | Performed by: INTERNAL MEDICINE

## 2025-03-11 PROCEDURE — 25010000002 PROPOFOL 10 MG/ML EMULSION: Performed by: NURSE ANESTHETIST, CERTIFIED REGISTERED

## 2025-03-11 PROCEDURE — 31654 BRONCH EBUS IVNTJ PERPH LES: CPT | Performed by: INTERNAL MEDICINE

## 2025-03-11 RX ORDER — ONDANSETRON 2 MG/ML
4 INJECTION INTRAMUSCULAR; INTRAVENOUS ONCE AS NEEDED
Status: DISCONTINUED | OUTPATIENT
Start: 2025-03-11 | End: 2025-03-11 | Stop reason: HOSPADM

## 2025-03-11 RX ORDER — SODIUM CHLORIDE 9 MG/ML
40 INJECTION, SOLUTION INTRAVENOUS AS NEEDED
Status: DISCONTINUED | OUTPATIENT
Start: 2025-03-11 | End: 2025-03-11 | Stop reason: HOSPADM

## 2025-03-11 RX ORDER — SODIUM CHLORIDE 0.9 % (FLUSH) 0.9 %
3 SYRINGE (ML) INJECTION EVERY 12 HOURS SCHEDULED
Status: DISCONTINUED | OUTPATIENT
Start: 2025-03-11 | End: 2025-03-11 | Stop reason: HOSPADM

## 2025-03-11 RX ORDER — HYDROCODONE BITARTRATE AND ACETAMINOPHEN 5; 325 MG/1; MG/1
1 TABLET ORAL EVERY 4 HOURS PRN
Status: DISCONTINUED | OUTPATIENT
Start: 2025-03-11 | End: 2025-03-11 | Stop reason: HOSPADM

## 2025-03-11 RX ORDER — SODIUM CHLORIDE, SODIUM LACTATE, POTASSIUM CHLORIDE, CALCIUM CHLORIDE 600; 310; 30; 20 MG/100ML; MG/100ML; MG/100ML; MG/100ML
1000 INJECTION, SOLUTION INTRAVENOUS CONTINUOUS
Status: DISCONTINUED | OUTPATIENT
Start: 2025-03-11 | End: 2025-03-11 | Stop reason: HOSPADM

## 2025-03-11 RX ORDER — HYDROCODONE BITARTRATE AND ACETAMINOPHEN 10; 325 MG/1; MG/1
1 TABLET ORAL EVERY 4 HOURS PRN
Status: DISCONTINUED | OUTPATIENT
Start: 2025-03-11 | End: 2025-03-11 | Stop reason: HOSPADM

## 2025-03-11 RX ORDER — FLUMAZENIL 0.1 MG/ML
0.2 INJECTION INTRAVENOUS AS NEEDED
Status: DISCONTINUED | OUTPATIENT
Start: 2025-03-11 | End: 2025-03-11 | Stop reason: HOSPADM

## 2025-03-11 RX ORDER — ASPIRIN 81 MG/1
81 TABLET, CHEWABLE ORAL ONCE
Status: COMPLETED | OUTPATIENT
Start: 2025-03-11 | End: 2025-03-11

## 2025-03-11 RX ORDER — PROPOFOL 10 MG/ML
VIAL (ML) INTRAVENOUS AS NEEDED
Status: DISCONTINUED | OUTPATIENT
Start: 2025-03-11 | End: 2025-03-11 | Stop reason: SURG

## 2025-03-11 RX ORDER — ALBUTEROL SULFATE 0.83 MG/ML
2.5 SOLUTION RESPIRATORY (INHALATION) ONCE
Status: COMPLETED | OUTPATIENT
Start: 2025-03-11 | End: 2025-03-11

## 2025-03-11 RX ORDER — PHENYLEPHRINE HCL IN 0.9% NACL 1 MG/10 ML
SYRINGE (ML) INTRAVENOUS AS NEEDED
Status: DISCONTINUED | OUTPATIENT
Start: 2025-03-11 | End: 2025-03-11 | Stop reason: SURG

## 2025-03-11 RX ORDER — LIDOCAINE HYDROCHLORIDE 20 MG/ML
INJECTION, SOLUTION EPIDURAL; INFILTRATION; INTRACAUDAL; PERINEURAL AS NEEDED
Status: DISCONTINUED | OUTPATIENT
Start: 2025-03-11 | End: 2025-03-11 | Stop reason: SURG

## 2025-03-11 RX ORDER — SODIUM CHLORIDE 0.9 % (FLUSH) 0.9 %
10 SYRINGE (ML) INJECTION EVERY 12 HOURS SCHEDULED
Status: DISCONTINUED | OUTPATIENT
Start: 2025-03-11 | End: 2025-03-11 | Stop reason: HOSPADM

## 2025-03-11 RX ORDER — SODIUM CHLORIDE 0.9 % (FLUSH) 0.9 %
3 SYRINGE (ML) INJECTION AS NEEDED
Status: DISCONTINUED | OUTPATIENT
Start: 2025-03-11 | End: 2025-03-11 | Stop reason: HOSPADM

## 2025-03-11 RX ORDER — SODIUM CHLORIDE 0.9 % (FLUSH) 0.9 %
3-10 SYRINGE (ML) INJECTION AS NEEDED
Status: DISCONTINUED | OUTPATIENT
Start: 2025-03-11 | End: 2025-03-11 | Stop reason: HOSPADM

## 2025-03-11 RX ORDER — SODIUM CHLORIDE, SODIUM LACTATE, POTASSIUM CHLORIDE, CALCIUM CHLORIDE 600; 310; 30; 20 MG/100ML; MG/100ML; MG/100ML; MG/100ML
100 INJECTION, SOLUTION INTRAVENOUS CONTINUOUS
Status: DISCONTINUED | OUTPATIENT
Start: 2025-03-11 | End: 2025-03-11 | Stop reason: HOSPADM

## 2025-03-11 RX ORDER — DEXAMETHASONE SODIUM PHOSPHATE 4 MG/ML
INJECTION, SOLUTION INTRA-ARTICULAR; INTRALESIONAL; INTRAMUSCULAR; INTRAVENOUS; SOFT TISSUE AS NEEDED
Status: DISCONTINUED | OUTPATIENT
Start: 2025-03-11 | End: 2025-03-11 | Stop reason: SURG

## 2025-03-11 RX ORDER — LABETALOL HYDROCHLORIDE 5 MG/ML
5 INJECTION, SOLUTION INTRAVENOUS
Status: DISCONTINUED | OUTPATIENT
Start: 2025-03-11 | End: 2025-03-11 | Stop reason: HOSPADM

## 2025-03-11 RX ORDER — FENTANYL CITRATE 50 UG/ML
50 INJECTION, SOLUTION INTRAMUSCULAR; INTRAVENOUS
Status: DISCONTINUED | OUTPATIENT
Start: 2025-03-11 | End: 2025-03-11 | Stop reason: HOSPADM

## 2025-03-11 RX ORDER — SODIUM CHLORIDE 0.9 % (FLUSH) 0.9 %
10 SYRINGE (ML) INJECTION AS NEEDED
Status: DISCONTINUED | OUTPATIENT
Start: 2025-03-11 | End: 2025-03-11 | Stop reason: HOSPADM

## 2025-03-11 RX ORDER — LIDOCAINE HYDROCHLORIDE 10 MG/ML
0.5 INJECTION, SOLUTION EPIDURAL; INFILTRATION; INTRACAUDAL; PERINEURAL ONCE AS NEEDED
Status: DISCONTINUED | OUTPATIENT
Start: 2025-03-11 | End: 2025-03-11 | Stop reason: HOSPADM

## 2025-03-11 RX ORDER — ONDANSETRON 2 MG/ML
INJECTION INTRAMUSCULAR; INTRAVENOUS AS NEEDED
Status: DISCONTINUED | OUTPATIENT
Start: 2025-03-11 | End: 2025-03-11 | Stop reason: SURG

## 2025-03-11 RX ORDER — ROCURONIUM BROMIDE 10 MG/ML
INJECTION, SOLUTION INTRAVENOUS AS NEEDED
Status: DISCONTINUED | OUTPATIENT
Start: 2025-03-11 | End: 2025-03-11 | Stop reason: SURG

## 2025-03-11 RX ORDER — NALOXONE HCL 0.4 MG/ML
0.4 VIAL (ML) INJECTION AS NEEDED
Status: DISCONTINUED | OUTPATIENT
Start: 2025-03-11 | End: 2025-03-11 | Stop reason: HOSPADM

## 2025-03-11 RX ORDER — IBUPROFEN 600 MG/1
600 TABLET, FILM COATED ORAL EVERY 6 HOURS PRN
Status: DISCONTINUED | OUTPATIENT
Start: 2025-03-11 | End: 2025-03-11 | Stop reason: HOSPADM

## 2025-03-11 RX ORDER — ACETAMINOPHEN 500 MG
1000 TABLET ORAL ONCE
Status: COMPLETED | OUTPATIENT
Start: 2025-03-11 | End: 2025-03-11

## 2025-03-11 RX ADMIN — Medication 200 MCG: at 13:46

## 2025-03-11 RX ADMIN — LIDOCAINE HYDROCHLORIDE 100 MG: 20 INJECTION, SOLUTION EPIDURAL; INFILTRATION; INTRACAUDAL; PERINEURAL at 13:00

## 2025-03-11 RX ADMIN — DEXAMETHASONE SODIUM PHOSPHATE 4 MG: 4 INJECTION, SOLUTION INTRA-ARTICULAR; INTRALESIONAL; INTRAMUSCULAR; INTRAVENOUS; SOFT TISSUE at 14:09

## 2025-03-11 RX ADMIN — ASPIRIN 81 MG: 81 TABLET, CHEWABLE ORAL at 12:42

## 2025-03-11 RX ADMIN — ONDANSETRON 4 MG: 2 INJECTION INTRAMUSCULAR; INTRAVENOUS at 14:09

## 2025-03-11 RX ADMIN — Medication 200 MCG: at 13:48

## 2025-03-11 RX ADMIN — Medication 200 MCG: at 13:14

## 2025-03-11 RX ADMIN — ACETAMINOPHEN 1000 MG: 500 TABLET, FILM COATED ORAL at 12:42

## 2025-03-11 RX ADMIN — Medication 50 MG: at 13:00

## 2025-03-11 RX ADMIN — Medication 200 MCG: at 13:42

## 2025-03-11 RX ADMIN — Medication 200 MCG: at 13:29

## 2025-03-11 RX ADMIN — ALBUTEROL SULFATE 2.5 MG: 2.5 SOLUTION RESPIRATORY (INHALATION) at 12:53

## 2025-03-11 RX ADMIN — Medication 100 MCG: at 13:16

## 2025-03-11 RX ADMIN — ROCURONIUM BROMIDE 50 MG: 10 INJECTION, SOLUTION INTRAVENOUS at 13:00

## 2025-03-11 RX ADMIN — SODIUM CHLORIDE, POTASSIUM CHLORIDE, SODIUM LACTATE AND CALCIUM CHLORIDE 100 ML/HR: 600; 310; 30; 20 INJECTION, SOLUTION INTRAVENOUS at 12:32

## 2025-03-11 RX ADMIN — SUGAMMADEX 200 MG: 100 INJECTION, SOLUTION INTRAVENOUS at 14:21

## 2025-03-11 RX ADMIN — PROPOFOL 50 MG: 10 INJECTION, EMULSION INTRAVENOUS at 13:00

## 2025-03-11 RX ADMIN — HYDROCORTISONE SODIUM SUCCINATE 125 MG: 250 INJECTION, POWDER, FOR SOLUTION INTRAMUSCULAR; INTRAVENOUS at 13:08

## 2025-03-11 NOTE — OP NOTE
Procedure Note (AdvancedBronchoscopy)    Date of Operation: 03/11/25  Pre-op Diagnosis: Multiple lung nodules  Post-op Diagnosis: Same  Surgeon: Camron Borjas MD  Anesthesia: General    Operation: Flexible fiberoptic bronchoscopy, Bronchoscopy, Diagnostic, Ion robotic shape sensing navigational bronchoscopy, Radial probe endobronchial ultrasound, Linear endobronchial ultrasound, Transbronchial biopsy, and Transbronchial needle aspirate of lung with navigational and fluoroscopic guidance x 2   Findings: Endobronchial anatomy, concentric radial view on the right side following cone beam CT imaging and rearticulation after removing target, concentric radial view on the left side, cone beam confirmation not required.  Livier positive on left side, blood on right side.  Specimen:   Specimens       ID Source Type Tests Collected By Collected At Frozen?    A Lung, Right Lower Lobe Fine Needle Aspirate FINE NEEDLE ASPIRATION   Camron Borjas MD 3/11/25 1309     Description: RLL FNA    B Lung, Left Lower Lobe Fine Needle Aspirate FINE NEEDLE ASPIRATION   Camron Borjas MD 3/11/25 1310     Description: LLL FNA    C Lung, Right Lower Lobe Tissue TISSUE PATHOLOGY EXAM   Camron Borjas MD 3/11/25 1313     Description: RLL bx    D Lung, Left Lower Lobe Tissue TISSUE PATHOLOGY EXAM   Camron Borjas MD 3/11/25 1314     Description: LLL bx          Estimated Blood Loss: 5 ml  Complications: None    Indications and History:  The patient is a 84 y.o.  male with Multiple lung nodules.  The risks, benefits, complications, treatment options and expected outcomes were discussed with the patient.  The possibilities of reaction to medication, pulmonary aspiration, perforation of a viscus, bleeding, failure to diagnose a condition and creating a complication requiring transfusion or operation were discussed with the patient who freely signed the consent.  Time out was taken prior to the procedure.    Description  of Procedure:    After the induction of general anesthesia, the patient was positioned supine and the Olympus BF H190 bronchoscope was introduced through the endotracheal tube.  The scope was then passed into the trachea.     The trachea, mainstem bronchi, RUL, RML, Bronchus Intermedius, RLL, SANDRO, SANDRO upper division and lingula, and LLL, and all primary segmental airways were examined and were normal except as described below:    Endobronchial findings:  Trachea: Normal mucosa  Brandon: Sharp  Right main bronchus: Normal mucosa  Right upper lobe bronchus: Normal mucosa  Right middle lobe bronchus: Normal mucosa  Right lower lobe bronchus: Normal mucosa  Left main bronchus: Normal mucosa  Left upper lobe bronchus: Normal mucosa  Left lower lobe bronchus: Normal mucosa    The conventional bronchoscope was removed .    Using the planning laptop and Planpoint software, the lesion of interest was identified, and marked, a pathway was generated, and anatomic borders were identified.The ION system was magnetically docked to the ETT. The shape sensing catheter with vision probe was introduced via the  into the ETT.    Registration was started by advancing the bronchoscope into the right and left mainstem bronchi. The main brandon was confirmed by rotating the live-view image to match the navigation-view image of the main brandon. Registration was completed by advancing the catheter tip into the upper and lower lobar bronchi bilaterally with positional confirmation by the robotic system. There was moderate visual divergence that was corrected by using ion preview pathology function on the right side.  There was no diversions on the left side.. Navigation was complicated by : visual divergence, endobronchial mucous impairing visualization, airway collapse, and small caliber airways with scope popping out into the lung tissue. Using the , the shape sensing bronchoscope was advanced to the target lesion.  The mobile C-Arm was brought in, and the vision probe was removed. A peripheral radial ultrasound probe was utilized to better localize the lesion in the left lower lobe lateral basal segment, giving a concentric view.     Transbronchial needle aspirations of the target lesion were performed using an Intuitive Flexision 23 gauge needle and sent for routine cytology. The procedure was guided by fluoroscopy and radial ultrasound. Transbronchial needle aspiration technique was selected because the sampling site was not accessible using standard bronchoscopic techniques. 6 needle aspirations were obtained. Rapid On-Site Evaluation: positive    Transbronchial biopsies of the target lesion were performed using a [employer] DBF-1.8-S Captura spikeless forceps and sent for histopathology examination. The procedure was guided by fluoroscopy and radial ultrasound. Transbronchial biopsy technique was selected because the sampling site was not visible endoscopically.  24  biopsy samples were obtained. 15ml of iced saline was instilled through the Ion robot catheter. The vision probe was reintruced to examine the biopsied area.      Attention then was directed to the right side, to the lesion indicated target 1 in the plan.  Using the , the shape sensing bronchoscope was advanced to the target lesion. The mobile C-Arm was brought in, and the vision probe was removed. A peripheral radial ultrasound probe was utilized to better localize the lesion in the left lower lobe lateral basal segment, giving a week concentric view. Cone beam 3 d imaging was collected using [employer] c arm.  The cone beam images were integrated into the robot computer and target was adjusted and the bronchoscope was rearticulated to the updated target.  An Ion Flexision 23 g needle was inserted into the lesion and a second spin of cone beam 3 d imaging was collected using [employer] c arm.  The cone beam images were integrated into the robot  computer and target was adjusted once again and the scope rearticulated.    Transbronchial needle aspirations of the target lesion were performed using an Intuitive Flexision 23 gauge needle and sent for routine cytology. The procedure was guided by fluoroscopy and radial ultrasound. Transbronchial needle aspiration technique was selected because the sampling site was not accessible using standard bronchoscopic techniques. 5 needle aspirations were obtained. Rapid On-Site Evaluation: positive    Transbronchial biopsies of the target lesion were performed using a [employer] DBF-1.8-S Captura spikeless forceps and sent for histopathology examination. The procedure was guided by fluoroscopy and radial ultrasound. Transbronchial biopsy technique was selected because the sampling site was not visible endoscopically. 21 biopsy samples were obtained. 15ml of iced saline was instilled through the Ion robot catheter. The vision probe was reintruced to examine the biopsied area    The vision probe and shape sensing catheter were extracted and catheter guide was disconnected.  The Olympus BF-VB616H EBUS bronchoscope was introduced and the mediastinum was examined via linear ultrasound.  Findings: Small approximately 6 mm station 4R node.  On attempt to sample this node we advanced the EBUS needle through the scope.  A large amount of torque was required to establish a captured image and this could not be reproduced with the catheter in the scope and with the balloon inflated I was notified by anesthesia that they could no longer ventilate the patient.  I was concerned that the location of this lesion then the amount of torque and balloon required was going to pose too much danger to the patient so we abandoned this biopsy.  On removal of the bronchoscope we noted loss of the tip of the balloon.  The conventional bronchoscope was then advanced into the airway and balloon was identified in the right mainstem bronchus and retrieved  with a forcep..  The bronchoscope was removed.    The patient was undocked from the ion robot arm.  The Patient was extubated and taken to the Recovery Room in satisfactory condition.      Camron Borjas MD at 14:40 CDT, 3/11/2025

## 2025-03-11 NOTE — ANESTHESIA PREPROCEDURE EVALUATION
Anesthesia Evaluation     no history of anesthetic complications:   NPO Solid Status: > 8 hours  NPO Liquid Status: > 8 hours           Airway   Mallampati: I  TM distance: >3 FB  No difficulty expected  Dental      Pulmonary    (+) lung cancer (s/p surgery, recurrence followed by radiation 2021, now with concerning lesion on mri), COPD,home oxygen  Cardiovascular   Exercise tolerance: poor (<4 METS)    (+) hypertension, cardiac stents within the past 12 months , hyperlipidemia  (-) CAD    ROS comment: Multiple stents, most recent 7/2024    Neuro/Psych  (-) seizures, TIA, CVA  GI/Hepatic/Renal/Endo    (+) renal disease- CRI  (-) diabetes    Musculoskeletal     Abdominal    Substance History      OB/GYN          Other   arthritis,                   Anesthesia Plan    ASA 3     general     (Asa in preop)  intravenous induction     Anesthetic plan, risks, benefits, and alternatives have been provided, discussed and informed consent has been obtained with: patient.    CODE STATUS:

## 2025-03-11 NOTE — ANESTHESIA POSTPROCEDURE EVALUATION
"Patient: Hoang Renteria    Procedure Summary       Date: 03/11/25 Room / Location: [facility] OR 08 / [facility] OR; [facility] XRAY    Anesthesia Start: 1257 Anesthesia Stop: 1440    Procedures:       CT LIMITED LOCALIZED FOLLOW UP STUDY      FL C ARM DURING SURGERY      BRONCHOSCOPY WITH ION ROBOT (Bronchus) Diagnosis:       Multiple lung nodules      Personal history of lung cancer      History of radiation therapy      Chronic respiratory failure with hypoxia      (bronch)      (bronch)      (Multiple lung nodules [R91.8])      (Personal history of lung cancer [Z85.118])      (History of radiation therapy [Z92.3])      (Chronic respiratory failure with hypoxia [J96.11])    Scheduled Providers: Camron Borjas MD Provider: RENATO Erwin CRNA    Anesthesia Type: general ASA Status: 3            Anesthesia Type: general    Vitals  Vitals Value Taken Time   /72 03/11/25 15:14   Temp 97.3 °F (36.3 °C) 03/11/25 15:13   Pulse 80 03/11/25 15:15   Resp 22 03/11/25 15:13   SpO2 90 % 03/11/25 15:15   Vitals shown include unfiled device data.        Post Anesthesia Care and Evaluation    Patient location during evaluation: PACU  Patient participation: complete - patient participated  Level of consciousness: awake and alert  Pain management: adequate    Airway patency: patent  Anesthetic complications: No anesthetic complications  PONV Status: none  Cardiovascular status: acceptable and hemodynamically stable  Respiratory status: acceptable  Hydration status: acceptable    Comments: Blood pressure 103/70, pulse 83, temperature 97.3 °F (36.3 °C), temperature source Temporal, resp. rate 22, height 169.5 cm (66.73\"), weight 82.2 kg (181 lb 3.5 oz), SpO2 91%.    Patient discharged from PACU based upon Fili score. Please see RN notes for further details    "

## 2025-03-11 NOTE — TELEPHONE ENCOUNTER
Patient's wife informed that new arrival time for ION Bronch is now 11:00 am.  She voiced understanding.

## 2025-03-11 NOTE — ANESTHESIA PROCEDURE NOTES
Airway  Reason: elective    Date/Time: 3/11/2025 1:01 PM  Airway not difficult    General Information and Staff    Patient location during procedure: OR  CRNA/CAA: RENATO Erwin CRNA    Indications and Patient Condition  Indications for airway management: airway protection    Preoxygenated: yes  MILS maintained throughout    Mask difficulty assessment: 1 - vent by mask    Final Airway Details    Final airway type: endotracheal airway      Successful airway: ETT  Cuffed: yes   Successful intubation technique: direct laryngoscopy and video laryngoscopy  Adjuncts used in placement: intubating stylet  Endotracheal tube insertion site: oral  Blade: Arevalo  Blade size: 3  ETT size (mm): 8.0  Cormack-Lehane Classification: grade I - full view of glottis  Placement verified by: chest auscultation, capnometry and palpation of cuff   Cuff volume (mL): 5  Measured from: lips  ETT/EBT  to lips (cm): 21  Number of attempts at approach: 1  Assessment: lips, teeth, and gum same as pre-op and atraumatic intubation

## 2025-03-13 LAB
BEAKER LAB AP INTRAOPERATIVE CONSULTATION: NORMAL
CYTO UR: NORMAL
LAB AP CASE REPORT: NORMAL
LAB AP DIAGNOSIS COMMENT: NORMAL
Lab: NORMAL
PATH REPORT.FINAL DX SPEC: NORMAL
PATH REPORT.GROSS SPEC: NORMAL

## 2025-03-14 NOTE — PROGRESS NOTES
"Keep January 12 as scheduled with Dr. Suad Rodriguez, CITLALLI  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 Renteria is a 84 year old male patient of Dr. Borjas with known COPD, RA, coronary artery disease, chronic kidney disease. He is a former smoker. History of lung cancer. Patient underwent left upper lobe wedge resection March 31, 2020 by Dr. 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     He reports persistent dark blood in his sputum following a bronchoscopy, which he suspects may be due to sinus drainage. He has not experienced any fevers, chills, or night sweats. He expresses concern about the potential need for antibiotics or prednisone, given his history of COPD, which has occasionally responded to prednisone. He also questions why the current findings were not detected earlier. He recalls that his previous scan in June was clear, but a subsequent scan three months later revealed abnormalities in both lungs. He underwent a wedge resection five years ago and would not want another surgery.        Objective   Vital Signs:   /82   Pulse 108   Ht 169.5 cm (66.73\")   Wt 81.2 kg (179 lb)   SpO2 91% Comment: 2L PD  BMI 28.26 kg/m²     Physical Exam  Vitals reviewed.   Constitutional:       Appearance: Normal appearance.   Cardiovascular:      Rate and Rhythm: Normal rate and regular rhythm.   Pulmonary:      Effort: Pulmonary effort is normal.      Breath sounds: Normal breath sounds.   Neurological:      General: No focal deficit present.      Mental Status: He is alert and oriented to person, place, and time. "   Psychiatric:         Mood and Affect: Mood normal.         Behavior: Behavior normal.          Result Review :  The following data was reviewed by: CITLALLI Negrete on 03/17/2025:  Data reviewed : path report    My interpretation of imaging:  as below  My interpretation of labs: none  Fine Needle Aspiration (03/11/2025 13:09)   XR Chest 1 View (03/11/2025 14:49)     PFT Values          6/14/2023    09:00 3/6/2025    08:30   Pre Drug PFT Results   FVC 90 90   FEV1 74 80   FEF 25-75% 44 56   FEV1/FVC 62 66   Other Tests PFT Results   DLCO  34   D/VAsb  42     My interpretation of the PFT : No new    Results for orders placed in visit on 03/06/25    Spirometry with Diffusion Capacity    Narrative  Spirometry with Diffusion Capacity    Performed by: Jaylin Villegas, RRT  Authorized by: Kyung Rodriguez APRN  Pre Drug % Predicted  FVC: 90%  FEV1: 80%  FEF 25-75%: 56%  FEV1/FVC: 66%  DLCO: 34%  D/VAsb: 42%    Interpretation  Spirometry  Spirometry shows mild obstruction. There is reduced midflow suggesting small airway/airflow obstruction.  Review of FVL curve  Patient's effort is normal.  Diffusion Capacity  The patient's diffusion capacity is severely reduced.  Diffusion capacity is moderately reduced when corrected for alveolar volume.  Overall comments: Compared to June 2023 FEV1 has improved from 74 to 80% predicted.  Diffusion capacity is compared to February 2020 and there has been a decrease from 52 to 42% predicted when corrected for alveolar volume.      Results for orders placed in visit on 06/14/23    Pulmonary Function Test    Narrative  Pulmonary Function Test  Performed by: Jaylin Villegas, RRT  Authorized by: Camron Borjas MD    Pre Drug % Predicted  FVC: 90%  FEV1: 74%  FEF 25-75%: 44%  FEV1/FVC: 62%    Interpretation  Spirometry  Spirometry shows moderate obstruction. There is reduced midflow suggesting small airway/airflow obstruction.  Overall comments: Stable  compared with 2020    .[facility]      Results for orders placed in visit on 02/04/20    Pulmonary Function Test    Narrative  Pulmonary Function Test  Performed by: Camron Borjas MD  Authorized by: Camron Borjas MD    Pre Drug  FVC: 87%  FEV1: 76%  FEF 25-75%: 42%  FEV1/FVC: 67.35%  DLCO: 55%  D/VAsb: 52%    Rest/Exercise Pulse Ox Values          1/9/2025    14:45 1/23/2025    09:00   Rest/Exercise Pulse Ox Results   Rest room air SAT % 78 93   Exercise room air SAT %  83   Rest on O2 @ Liters 3       4L 88%: 6L 88%; 8L 95% 2   Rest on O2 SAT % 87 95   Exercise on O2 @ Liters 8 2   Exercise on O2 SAT % 86 87       Assessment and Plan   Diagnoses and all orders for this visit:    1. Multiple lung nodules (Primary)  -     CT Chest Without Contrast Diagnostic; Future    2. 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 Chronic respiratory failure with hypoxia  Overview:  DME: [facility]  2L rest, 4L exertion       4. Personal history of nicotine dependence    5. Personal history of lung cancer  Overview:  Wedge resection 3/2020      6. History of radiation therapy    Other orders  -     saline (AYR) gel nasal gel; Apply 1 Application topically to the appropriate area as directed As Needed (nasal dryness).  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  He has a follow-up the end of this month with Dr. Bruner and will let him know that we have ordered a 3-month follow-up CT.  He will keep the follow-up with Dr. Borjas on June 12 and have the CT done just prior to that. AYR gel is sent to the pharmacy for his nasal dryness secondary to the oxygen.      Haong Renteria  reports that he quit smoking about 5 years ago. His smoking use included cigarettes. He started smoking about 70 years  ago. He has a 65 pack-year smoking history. He has been exposed to tobacco smoke. He has quit using smokeless tobacco.            LDCT: Does not qualify   Smoking Cessation: Former smoker, quit 2020  Vaccinations: Flu: Completed         PNA:   Completed    Follow Up   No follow-ups on file.  Patient was given instructions and counseling regarding his condition or for health maintenance advice. Please see specific information pulled into the AVS if appropriate.     CITLALLI Negrete  3/17/2025  09:12 CDT    Please note that portions of this note were completed with a voice recognition program.    Patient or patient representative verbalized consent for the use of Ambient Listening during the visit with  CITLALLI Negrete for chart documentation. 3/17/2025  09:13 CDT

## 2025-03-17 ENCOUNTER — OFFICE VISIT (OUTPATIENT)
Dept: PULMONOLOGY | Facility: CLINIC | Age: 85
End: 2025-03-17
Payer: MEDICARE

## 2025-03-17 VITALS
SYSTOLIC BLOOD PRESSURE: 128 MMHG | WEIGHT: 179 LBS | BODY MASS INDEX: 28.09 KG/M2 | OXYGEN SATURATION: 91 % | HEART RATE: 108 BPM | HEIGHT: 67 IN | DIASTOLIC BLOOD PRESSURE: 82 MMHG

## 2025-03-17 DIAGNOSIS — R91.8 MULTIPLE LUNG NODULES: Primary | ICD-10-CM

## 2025-03-17 DIAGNOSIS — Z85.118 PERSONAL HISTORY OF LUNG CANCER: ICD-10-CM

## 2025-03-17 DIAGNOSIS — Z87.891 PERSONAL HISTORY OF NICOTINE DEPENDENCE: ICD-10-CM

## 2025-03-17 DIAGNOSIS — Z92.3 HISTORY OF RADIATION THERAPY: ICD-10-CM

## 2025-03-17 DIAGNOSIS — J96.11 CHRONIC RESPIRATORY FAILURE WITH HYPOXIA: Chronic | ICD-10-CM

## 2025-03-17 DIAGNOSIS — J44.9 STAGE 2 MODERATE COPD BY GOLD CLASSIFICATION: ICD-10-CM

## 2025-03-17 PROCEDURE — 1159F MED LIST DOCD IN RCRD: CPT | Performed by: NURSE PRACTITIONER

## 2025-03-17 PROCEDURE — 1160F RVW MEDS BY RX/DR IN RCRD: CPT | Performed by: NURSE PRACTITIONER

## 2025-03-17 PROCEDURE — 99214 OFFICE O/P EST MOD 30 MIN: CPT | Performed by: NURSE PRACTITIONER

## 2025-03-17 RX ORDER — SODIUM CHLORIDE/ALOE VERA
1 GEL (GRAM) NASAL AS NEEDED
Qty: 14.1 G | Refills: 3 | Status: SHIPPED | OUTPATIENT
Start: 2025-03-17

## 2025-03-17 RX ORDER — DILTIAZEM HYDROCHLORIDE 120 MG/1
1 CAPSULE, EXTENDED RELEASE ORAL DAILY
COMMUNITY
Start: 2025-03-07

## 2025-03-17 RX ORDER — SODIUM CHLORIDE/ALOE VERA
SWAB, NON-MEDICATED NASAL
Status: CANCELLED | OUTPATIENT
Start: 2025-03-17

## 2025-03-25 NOTE — PROGRESS NOTES
[facility]  Radiation Oncology Clinic   Luis Felipe Kohli MD, FACR  Charliesejal Francis CITLALLI  _______________________________________________  [facility]  Department of Radiation Oncology  [address] [place] [id number]  Office: [phone]  Fax: [phone]    DATE: 03/27/2025  PATIENT: Hoang Renteria  1940                         MEDICAL RECORD #: [id number]    REASON FOR VISIT:    Chief Complaint   Patient presents with    Lung Cancer     1. Recurrent carcinoma of left lung    2. 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 Status post lobectomy of lung    4. History of radiation therapy    5. Former smoker                                              REASON FOR VISIT:    Chief Complaint   Patient presents with    Lung Cancer     Hoang Renteria is a 84 y.o. male that has been referred to our clinic to be evaluated for recurrent lung nodules on radiographic surveillance.  Bronchoscopy by Dr. Borjas did not reveal any evidence of recurrent malignancy.           HISTORY OF PRESENT ILLNESS:  Diagnosed in March 2020 with Adenocarcinoma of the lung, SANDRO, 1.3 cm. Underwent SANDRO wedge resection on 03/31/2020, pathology revealed  margins negative. Negative for PNI.   RECURRENCE: 03/16/2021 CT Chest revealed SANDRO nodularity increased, 1.8 cm (SUV 5.9). Completed 5000 cGy in 4 fractions to the left upper lobe of the lung on 05/07/2021.     06/22/2018 - CT Lung screening (Annual):  Lung rads category 4A-there are several foci of nodularity within the lung. The largest of the nodules measure 6 mm in size in the the right lower lobe.   There is also a focus of nodularity within the inferior lingular segment of the left upper lobe which radiographically has the appearance of atelectasis and/or scarring.   The nodular component does measure approximately 1.5 cm in long axis and I feel would categorize this patient into a 4a category.   Follow-up low dose CT  in 3 months is recommended to assure stability of this particular finding.     09/24/2018 - CT Lung screening (Annual):  Lung rads category   Overall stable appearance of the nodules from the previous study of 6/22/2018 except for interval diminishment in size of a focus of atelectasis/scarring within the lingular segment of the left upper lobe.   No new nodules are present.   Follow-up low dose CT imaging in 12 months is recommended.     01/16/2020 - CT Chest with and without contrast:  Left upper lobe with a 14 x 7 mm pulmonary nodule, previously 6 mm on 9/24/2018.   Right lower lobe with 7 mm pleural-based pulmonary nodule, previously 7 mm on 9/24/2018.   Impression:  Increased size of left upper lobe pulmonary nodule now measuring 14 x 7 mm, previously 6 mm on 9/24/2018. This is concerning for malignancy. Consider PET/CT or tissue sampling for further evaluation.   No lymphadenopathy.   Coronary artery, aortic and branch vessel atherosclerosis.     01/24/2020 - PET Scan:  Left upper lobe pulmonary nodule, only mildly hypermetabolic with a maximum density of 1.87 SUV, equivocal for malignancy. Follow-up recommended.   No scintigraphic evidence of hypermetabolic neoplastic disease.     02/04/2020 - Pulmonary Function tests:  FVC - 87   FEV1 - 76   FEF 25-75% - 42   FEV1/FVC - 67.35   DLCO - 55   D/VAsb - 52     02/04/2020 - Appointment with :  The series of images is reviewed.   I am very concerned about the progressive growth, despite the equivocal pet result, with risk for lung cancer.   Refer to CT surgery for eval for wedge +/- lobectomy. FEV1 ok, although DLCO is reduced.I am giving him a sample of anoro and we showed him how to use it. I do not know why epic did not embed that action under plan above, but I can't get the program to do that.   Recommend smoking abstinence.   On anticoagulation which will need to be held prior to surgery.    03/31/2020 - Lung, left upper lobe wedge resection - per  :  Moderately differentiated lung adenocarcinoma, margins negative.   Carcinoma measures 1.3 cm in greatest dimension.   Negative for evidence of pleural invasion.   Surgical excision margins are negative for evidence of malignancy.   Mild subpleural emphysematous changes involving nonneoplastic lung parenchyma.   AJCC STAGE: pT1a, pNx, pMx     05/06/2020 - Appointment with :  We will plan follow-up in September with repeat chest CT as well as pulmonary function testing then.  We will try to see him for a physical real visit in the office.    Unable to do physical exam over the phone other than just listening to his voice.    His voice is strong and able to complete full sentences and is in good spirits and good humor.    08/31/2020 - CT Chest without contrast:  Postoperative change of thoracotomy with LEFT upper lobe wedge resection.   No evidence of residual or metastatic disease.   There is nodular thickening along the wedge resection line which is likely postoperative scarring but recommend continued imaging surveillance.     09/08/2020 - Appointment with :  Patient seems to be stable from an oncology standpoint.  Latest CT of the chest at the end of last month at [facility] is negative.  We will continue to follow him with intermittent imaging.  He did have a wedge resection.  COPD is at least moderate based on his latest spirometry.    He has not responded well to other inhalers in the past.    I do have some Bevespi samples and will give him a trial of that, 2 puffs twice a day.  Coronavirus avoidance.    We will follow him up with spirometry in a few months.    We will get follow-up imaging next year as well.    We will order that when he comes back in.    03/09/2021 - Appointment with :  Will plan follow up ct. Pt gets all his films at [facility], so we will order it there and will need to get a disc to review. I told him to make sure we discuss result over phone.    Discussed potential for data transfer failure between computer systems at the 2 institutions.   Follow up in 6 months with spirometry. Continue mobilization as much as he can.   Could benefit from pulm rehab once coronavirus pandemic improves.    03/16/2021 - CT Chest without contrast:  There is suture material in the left upper lobe with adjacent nodularity. The adjacent soft tissue nodularity has increased insize since the prior exam, measuring 18 x 16 mm in size, previously measuring 10 x 6 mm in size. There is some adjacent parenchymal distortion, presumably related to scarring.   There is a tiny 3 mm nodule in the medial left upper lobe, more conspicuous on the current exam (series 3 image 43).  Several tiny nodules are seen along the left major fissure, presumably tiny intrafissural lymph nodes. There are dependent changes in the lung bases.   There is mild diffuse bronchial wall thickening.   There is some atelectasis versus scarring and bronchiectasis in the right middle lobe.   There is a small pleural-based nodule in the lateral right lower lobe which measures approximately 8 mm in size, previously measuring 10 mm in size.   Review of the visualized portion of the upper abdomen demonstrates multiple exophytic renal lesions, incompletely characterized on this exam.   At least one appears hyperdense, suggesting hemorrhagic or proteinaceous cyst.   Impression:  Soft tissue nodule adjacent to suture material in the left upper lobe is concerning for recurrent disease.   Multiple renal lesions, incompletely characterized on this exam. Follow-up nonemergent renal ultrasound recommended.   Atherosclerosis of the aorta and coronary arteries.    03/19/2021 - Appointment with :  We discussed the findings and reviewed the film Primary and most substantial concern is possible recurrence of lung cancer in patient with prior smoking history.  Other possibilities include hypertrophic scarring at surgical site,  reaction to coronavirus vaccine (doubt, but brought up by pt), granuloma or other benign disease, metastatic lesion from renal lesions (also doubtful).   Will plan PET scan.   If abnormal and suggestive of malignancy, and no other sites identified, then he is a candidate for stereotactic radiosurgery.   If negative or equivocal, then ongoing follow up could be considered as an alternative.   Tissue diagnosis would likely require robotic bronchoscopy.   COPD is stable    03/31/2021 - PET Scan:  An 18 mm nodule in the left upper lobe demonstrates an SUV of 5.9.  Review of the mediastinum reveals no hypermetabolic lymphadenopathy.   Impression:  Solitary left upper lobe pulmonary nodule with abnormal SUV 5.9 allograft   There are no other regions of abnormal metabolic activity.     04/07/2021 - Appointment with :  Following this discussion and in consideration of the diagnostic data/evaluation of the patient, I recommended a course of stereotactic radiosurgery to the SANDRO nodule, will simulate treatment fields today, 3D CT with MIPS to begin the planning process, final course pending.  Plan:  Plan on 4-5 radiation treatments over about 2 weeks  Very little in the way of side effects  We will follow with CT scans of thorax every 3 months after treatment    04/27/2021 - 05/07/2021 - Completed Radiation course:  Received 5000 cGy in 4 fractions to left upper lobe of lung via stereotactic radiosurgery.    05/19/2021 - Appointment with :  Return in about 3 months (around 8/19/2021).    08/02/2021 - CT Chest:  Decreased soft tissue component along the left upper lobe suture  line with probable postradiation changes.   Stable right lower lobe 8 mm pulmonary nodule compared to 3/16/2021.   Heavily calcified coronary arteries versus stent.     08/09/2021 - Appointment with :  Your scan looks great, you are in remission!   Return to Dr. Bruner in 3 months with a CT scan before.     10/27/2021 - CT chest  without contrast:  Increasing left upper and superior segment left lower lobe opacities compared to 8/2/2021 which may represent sequelae of radiation. Superimposed pneumonia considered. Similar postoperative changes of the left upper lung. Stable 8 mm subpleural right lower lobe nodule. No new dominant nodules identified.   Left coronary artery stents and/or calcifications.     11/10/2021 - Appointment with :  CT-scan of the chest on 10/26/2021 revealed increasing left upper and superior segment left lower lobe opacities compared to 8/2/2021 which may represent sequelae of radiation. Superimposed pneumonia considered. Similar postoperative changes of the left upper lung. Stable 8 mm subpleural right lower lobe nodule. No new dominant nodules identified. Left coronary artery stents and/or calcifications.   I have ordered a PET scan for further evaluation of the increased left upper lobe opacities noted on the CT scan. We will continue routine follow-up/surveillance as discussed in 3 months with follow up CT scan before visit and I have instructed him to continue to see the other health care providers as per their scheduling.  Plan:  We will order a PET scan, they will call you to schedule.  Otherwise we will see you back in 3 months with a CT chest before.     11/12/2021 - PET Scan:  There is diffuse uptake in the left upper lobe and superior segment left lower lobe that corresponds to infiltrates seen on recent CT. The SUV max is 4.3. Residual tumor in this area cannot be ruled out. Most of the uptake is likely infectious/inflammatory.  No evidence of any distant metastatic disease.    02/07/2022 - CT Chest with contrast:  Scarlike infiltrate with focal calcification in the left upper lobe is slightly smaller now and postradiation changes favored.  No new lung abnormality.    02/10/2022 - Appointment with :  Follow up in 3 months     03/11/2022 - CT Abdomen/Pelvis with contrast:  1.5 cm left lower  renal pole enhancing mass is most concerning for renal cell carcinoma.   Asymmetric soft tissue density in the posterior dome of the bladder wall. Although the bladder isn't very distended, underlying opacity is not excluded.     05/05/2022 - CT Chest with contrast:  Continued decrease in area of masslike consolidation in the LEFT upper lobe along the wedge resection suture line now measuring 3.6 x 2.7 cm on axial image 46 (previously 4.2 by 3.2 cm).   Surrounding groundglass is also decreased.   No change in subpleural 8 mm triangular nodule in the RIGHT lower lobe on image 90.   No new or enlarging pulmonary nodule.  Impression:  Decrease in masslike consolidation in the LEFT upper lobe along the wedge resection suture line. Favor posttreatment change but recommend continued imaging surveillance/follow-up.   No evidence of metastatic disease in the chest.    05/11/2022 - Appointment with :  Follow up in 4 months with Chest CT    06/13/2022 - Appointment with :  Follow up in one year     09/29/2022 - CT chest with contrast:  Stable appearance of the chest from previous study 5/5/2022. No radiographic evidence of localized recurrence or metastatic disease.  Scarring within the periphery of the left upper lobe with involvement of the adjacent superior segment of the left lower lobe with associated pleural thickening and calcification. This is felt to represent post therapeutic change adjacent to a staple line and is stable from the previous exam. No new or developing pulmonary nodules are present. No developing mediastinal, hilar or axillary adenopathy.    10/06/2022 - Appointment with :  Follow up in 4 months     02/03/2023 - CT Chest with contrast:  No evidence of recurrent or metastatic disease. Stable posttreatment change in the LEFT upper chest.  There is a single new 4 mm pulmonary nodule in the RIGHT upper lobe. Favor this to be related to an infectious or inflammatory process but  recommend follow-up imaging to confirm resolution.    02/08/2023 - Appointment with :  Return in 3 months with a CT chest before    05/08/2023 - CT Chest with contrast:  No evidence of recurrent or metastatic disease. Stable post treatment change in the LEFT upper chest.  No change in the 4 mm RIGHT upper lobe pulmonary nodule that was new on the prior study. No new or enlarging pulmonary nodule    05/10/2023 - Appointment with :  On exam, I do not see evidence for recurrent or metastatic disease at this time. We will continue routine follow-up/surveillance as discussed in 6 months with follow up CT scan prior to next visit. I have instructed him to continue to see the other health care providers as per their scheduling.  Plan:  Return in 6 months with a CT chest before    06/07/2023 - CT Abdomen/pelvis with contrast:  Enhancing mass in the left lower renal pole suspicious for neoplasm.  Size currently measures 2.1 cm, previously reported as 1.5 cm.   Nephrolithiasis.  No hydronephrosis.   Extensive atherosclerosis. Coronary calcifications.   Mildly thickened bladder wall, likely associated with chronic outlet obstruction.  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     11/07/2023 - CT Chest with contrast:  LEFT kidney with a 1.5 cm lesion which appears to be enhancing. This is concerning for renal cell carcinoma until proven otherwise. There are also 2 indeterminate LEFT renal lesions in the field-of-view. Recommend CT abdomen pelvis without and with contrast (renal mass protocol) for further evaluation.  There are a few scattered tiny pulmonary nodules and groundglass opacities as described above, which could be infectious/inflammatory. Similar posttreatment changes to the LEFT lung. Recommend follow-up CT chest in 3 months or per oncology.  Heavily calcified coronary arteries versus stent material. 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    11/13/2023 - Appointment with  :  Follow up in 3 months     11/22/2023 - Urinary bladder, biopsy:   Benign urothelial mucosa with moderate chronic inflammation and changes consistent with cystitis glandularis.     02/02/2024 - CT chest with contrast:  Stable pleural-based masslike opacity in the left upper lobe is likely posttreatment related.  Several solid nodules bilaterally are stable. There has been interval development of a few groundglass and solid nodules bilaterally as described above. These may be infectious in etiology however continued attention on follow-up is recommended.  Subcentimeter groundglass nodules in the right upper lobe on the prior examination have resolved.    02/02/2024 - CT Abdomen/Pelvis with contrast:  Exophytic mass at the mid pole of the left kidney represents a hyperdense cyst. There are multiple other bilateral renal cysts, vascular calcifications, and probable nonobstructing renal stones.  No acute abnormality identified. There is no CT evidence for metastatic disease.    02/05/2024 - Appointment with :  On exam, I do not see evidence for recurrent or metastatic disease at this time. We will continue routine follow-up/surveillance as discussed in 6 months with follow up CT scan prior to next visit. I have instructed him to continue to see the other health care providers as per their scheduling.  Plan:  Return in 6 months with a CT chest before     07/15/2024 - Presented to ER with complaints of shortness of breath. Patient reported worsening shortness of air ongoing for the past few weeks. He initially was seen by his PCP and was started on oral steroids. He had been utilizing supplemental oxygen routinely. He became dyspneic at rest presented for further evaluation. Admitted due to acute hypoxemic respiratory failure due to pneumonia, bilateral pleural effusion, and COPD exacerbation.     07/16/2024 - CT Chest without contrast:  Bilateral dependent pleural effusions, moderate on the right and  small on the left, with mild pulmonary edema of both lower lungs.   Bilateral pneumonia as described.  Follow-up CT chest 1 month is recommended.   6.1 cm mass-like consolidation superimposed on suture material in the left upper lobe, probably at least partially due to postradiation therapy fibrosis.  Residual or recurrent mass cannot be excluded.  Correlation with prior studies, if available, might be helpful.  Attention to this finding in the follow-up scan 1 month.   Mild mediastinal and bilateral axillary adenopathy, potentially reactive or secondary to old granulomatous disease.  Metastatic disease cannot be excluded.   Moderate emphysema.     07/20/2024 - Discharged from [facility] with follow up appointments scheduled.     08/23/2024 - CT chest with contrast:  New findings worrisome for progression of disease, with several new nodules in the lungs, the largest is seen in the left lower lobe and measures 1.3 cm PET/CT may be helpful for follow-up.  No intrathoracic lymphadenopathy is identified.  Stable pleural parenchymal fibrosis in the left upper hemithorax probably related to previous radiation therapy.    08/23/2024 - Appointment with :  Follow up in 3 months    09/11/2024 - Appointment with :  Will plan await follow up ct to reassess nodules, particularly the one in LLL  Cannot stop plavix at this point after stent per Dr. Ireland in 5/2024, so we could not do bronchoscopy anyway now.  Notably also on eliquis which would have to be held for invasive procedure.  Follow back up after next ct.   Follow Up   Return in about 11 weeks (around 11/27/2024).    11/25/2024 - CT Chest without contrast:  Ill-defined patchy areas of infiltrate/groundglass opacity/infiltrate in the lungs bilaterally may represent an evolving acute inflammatory/infectious process. These were not seen in the previous study. A follow-up examination in 3 months is recommended to ensure resolution.  A persistent and stable  pleural-based masslike density/fibrosis in the left upper lung with intrinsic calcification and surrounding suture line.  Low-density nodules in the liver and the kidneys which are incompletely visualized and evaluated in this study.     12/02/2024 - Appointment with CITLALLI Currie - Radiation oncology:  On exam, I do not see evidence for recurrent or metastatic disease at this time.   We will continue routine follow-up/surveillance as discussed in 3 months with follow up CT scan before visit and I have instructed him to continue to see the other health care providers as per their scheduling.    02/27/2025 - CT Chest without contrast:  Significant increase in number of the lesions/nodules, too numerous to count since the previous study. Some of the referenced  lung nodules which were not present in the previous study are measured. Possibility of a metastatic disease may not be excluded.  There is stable pleural-based mass in the left upper lobe with intrinsic calcification may represent an area of scarring/fibrosis. No change.  Moderate nonspecific right axillary lymphadenopathy.  No evidence of mediastinal lymphadenopathy.  Moderate cardiomegaly.  Chronic emphysematous lung changes. Pulmonary arterial hypertension.    03/02/2025 - Documentation per :  I have reviewed this patient's recent CT scan.  He has increasing size and the number of multiple pulmonary nodules.  Although these are not typical in appearance for metastatic disease I am certainly suspicious this represents metastatic disease.  We will request a PET scan and referral back to Dr. Borjas for consideration of biopsy and tissue confirmation.    Will also obtain an MRI of the brain to rule out evidence of any brain metastases.    03/03/2025 - Appointment with CITLALLI Currie - Radiation oncology:  We discussed that these findings could be indicative of metastatic disease. I discussed his CT findings with Dr. Bruner, I will order a  brain MRI and PET scan to evaluate for metastatic disease.  Will order PET scan and brain MRI for further evaluation. I will discuss the CT findings with pulmonology for further recommendations. I have instructed him to continue to see the other health care providers as per their scheduling.   Plan:  will discuss your CT scan with Dr. Borjas and Ellie  Will order PET scan and brain MRI for further evaluation.    03/06/2025 - MRI Brain with and without contrast:  No metastatic disease identified.  Mild chronic small vessel ischemic changes.  Chronic paranasal sinus disease.    03/06/2025 - PET Scan:  Numerous bilateral pulmonary nodules are present which have increased in size and number. These nodules are somewhat ill-defined with some having surrounding groundglass opacity. Some of these nodules are mildly hypermetabolic and some have metabolic activity near physiologic background level. CT appearance and low level metabolic activity favor an infectious or inflammatory process but metastatic disease is certainly a possibility. Consider tissue sampling.  No suspicious hypermetabolic activity in the neck, abdomen, or pelvis.    03/06/2025 - CT Chest without contrast:  Previously treated left lung malignancy with the surgery and subsequent radiosurgery. Recent comparison chest CT demonstrated multiple increasing bilateral reticular opacities and irregularly-shaped nodule/consolidations. This has continued to increase on today's exam with both increasing scattered reticular opacities/subsolid nodules as well as enlarging irregularly-shaped nodule/consolidations. The distribution is interesting in that these are peripheral predominant and the solid-appearing nodules/consolidations are mostly juxtapleural as opposed to randomly distributed as is typical for metastatic disease. Metastatic disease would certainly be on the differential but I would also include secondary organizing  pneumonia which could be infectious or  potentially treatment related (pharmacotherapy related or radiation-induced).  No suspicious adenopathy.    03/06/2025 - Pulmonary function Tests:  Pre Drug % Predicted   FVC: 90%  FEV1: 80%  FEF 25-75%: 56%  FEV1/FVC: 66%  DLCO: 34%  D/VAsb: 42%    Interpretation Spirometry   Spirometry shows mild obstruction. There is reduced midflow suggesting small airway/airflow obstruction.   Review of FVL curve   Patient's effort is normal.   Diffusion Capacity  The patient's diffusion capacity is severely reduced.  Diffusion capacity is moderately reduced when corrected for alveolar volume.   Overall comments:   Compared to June 2023 FEV1 has improved from 74 to 80% predicted.  Diffusion capacity is compared to February 2020 and there has been a decrease from 52 to 42% predicted when corrected for alveolar volume.     03/06/2025 - Appointment with CITLALLI Negrete:   PFTs and PET scan reviewed with Dr. Borjas. We also discussed his Plavix and Eliquis use. Would prefer him to be off of the Plavix for 5 to 7 days. He is already taken his Plavix and Eliquis today. He will hold that on Friday until after the procedure. Approval received from Dr. Ireland's office. He will hold his Eliquis on Sunday and Monday and resume after the procedure. Approval received from Dr. Ireland's office. Also discussed with Dr. Borjas placing him on 81 mg aspirin for Monday and Tuesday. Patient will be elevated risk of bleeding. CITLALLI Bahena with radiation oncology has been updated as well. He will get his preop work done on Tuesday. Patient's wife has been updated as well.   Follow up in 8 days.    03/11/2025 - Bronchoscopy with biopsy per :  Left lower lobe lung, Ion biopsy (smear and cellblock sections):  No malignant cells identified.  Endobronchial cells, pulmonary macrophages, and fibrotic parenchyma.  Left lower lobe lung, Ion fine-needle aspiration (smear, and ThinPrep):  No malignant cells identified.  Endobronchial cells,  and pulmonary macrophages.  Right lower lobe lung, Ion biopsy (smear and cellblock section):  No malignant cells identified.  Endobronchial cells, pulmonary macrophages, and fibrotic parenchyma.  Right lower lobe lung, Ion fine-needle aspiration (smears and ThinPrep):  No malignant cells identified.  Rare pulmonary macrophages and cellular debris.    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    He has a follow-up the end of this month with Dr. Bruner and will let him know that we have ordered a 3-month follow-up CT.    He will keep the follow-up with Dr. Borjas on June 12 and have the CT done just prior to that. AYR gel is sent to the pharmacy for his nasal dryness secondary to the oxygen.         History obtained from  PATIENT, FAMILY, and CHART    PAST MEDICAL HISTORY  Past Medical History:   Diagnosis Date    Cholelithiasis Fall 2022    Gallbladder removed    Full dentures     Hyperlipidemia     Hypertension     Lung cancer 2020    Right lung lobectomy    Oxygen dependent     Pneumonia Nov 2022    Recurrent carcinoma of left lung 03/06/2025    Rheumatoid arthritis 10 years    Shortness of breath 02/04/2020    Stage 2 moderate COPD by GOLD classification 02/04/2020      PAST SURGICAL HISTORY  Past Surgical History:   Procedure Laterality Date    BRONCHOSCOPY WITH ION ROBOTIC ASSIST N/A 3/11/2025    Procedure: BRONCHOSCOPY WITH ION ROBOT;  Surgeon: Camron Borjas MD;  Location: [facility];  Service: Robotics - Pulmonary;  Laterality: N/A;  pre op: Multiple lung nodules  post op:Multiple lung nodules  pcp:Laura Weston MD    CHOLECYSTECTOMY  09/2022    COLONOSCOPY  03/22/2016    three polyps  all removed    COLONOSCOPY N/A 05/01/2019    Procedure:  COLONOSCOPY WITH ANESTHESIA;  Surgeon: Reddy Ann DO;  Location: [facility] ENDOSCOPY;  Service: Gastroenterology    COLONOSCOPY N/A 2023    Procedure: COLONOSCOPY WITH ANESTHESIA;  Surgeon: Reddy Ann DO;  Location: [facility] ENDOSCOPY;  Service: Gastroenterology;  Laterality: N/A;  Pre: Diarrhea, History of adenomatous polyp of colon  Post: polyp  Laura Weston MD    ENDOSCOPY N/A 2022    Procedure: ESOPHAGOGASTRODUODENOSCOPY WITH ANESTHESIA;  Surgeon: Reddy Ann DO;  Location: [facility] ENDOSCOPY;  Service: Gastroenterology;  Laterality: N/A;  Pre: Dysphagia  Post: Duodenitis  Laura Weston MD    HERNIA REPAIR      LUNG BIOPSY        FAMILY HISTORY  family history includes Cancer in his mother; Colon polyps in his sister.    SOCIAL HISTORY  Social History     Tobacco Use    Smoking status: Former     Current packs/day: 0.00     Average packs/day: 1 pack/day for 65.0 years (65.0 ttl pk-yrs)     Types: Cigarettes     Start date: 2/10/1955     Quit date: 2/10/2020     Years since quittin.1     Passive exposure: Past    Smokeless tobacco: Former   Vaping Use    Vaping status: Never Used   Substance Use Topics    Alcohol use: Not Currently    Drug use: No     ALLERGIES  Ipratropium and Sulfamethoxazole-trimethoprim     MEDICATIONS    Current Outpatient Medications:     albuterol (PROVENTIL) (2.5 MG/3ML) 0.083% nebulizer solution, Take 2.5 mg by nebulization Every 4 (Four) Hours As Needed for Wheezing., Disp: , Rfl:     albuterol sulfate  (90 Base) MCG/ACT inhaler, Inhale 2 puffs Every 4 (Four) Hours As Needed for Wheezing. Patient using 2 puffs twice a day, Disp: 18 g, Rfl: 11    allopurinol (ZYLOPRIM) 100 MG tablet, Take 1 tablet by mouth Daily., Disp: , Rfl:     atorvastatin (LIPITOR) 10 MG tablet, Take 1 tablet by mouth Daily., Disp: , Rfl:     clobetasol (TEMOVATE) 0.05 % cream, Apply  topically to the appropriate area as directed 2 (Two) Times a Day.,  Disp: , Rfl:     clopidogrel (PLAVIX) 75 MG tablet, Take 1 tablet by mouth Daily., Disp: , Rfl:     Cobalamine Combinations (B-12) 100-5000 MCG sublingual tablet, Place  under the tongue., Disp: , Rfl:     colestipol (COLESTID) 1 g tablet, Take 1 tablet by mouth 4 (Four) Times a Day., Disp: 120 tablet, Rfl: 11    dilTIAZem CD (CARDIZEM CD) 120 MG 24 hr capsule, Take 1 capsule by mouth Daily., Disp: , Rfl:     Eliquis 5 MG tablet tablet, Take 1 tablet by mouth 2 (Two) Times a Day., Disp: , Rfl:     fluticasone (FLONASE) 50 MCG/ACT nasal spray, Administer 2 sprays into the nostril(s) as directed by provider Daily., Disp: , Rfl:     furosemide (LASIX) 40 MG tablet, Take 1 tablet by mouth Daily., Disp: , Rfl:     gabapentin (NEURONTIN) 300 MG capsule, Take 300 mg by mouth 2 times daily., Disp: , Rfl:     hydroxychloroquine (PLAQUENIL) 200 MG tablet, Take 1 tablet by mouth Daily., Disp: , Rfl:     nitroglycerin (NITROSTAT) 0.4 MG SL tablet, , Disp: , Rfl:     O2 (OXYGEN), Inhale 3 L/min 1 (One) Time., Disp: , Rfl:     Omega 3 1200 MG capsule, Take 1 capsule by mouth., Disp: , Rfl:     saline (AYR) gel nasal gel, Apply 1 Application topically to the appropriate area as directed As Needed (nasal dryness)., Disp: 14.1 g, Rfl: 3    sodium bicarbonate 325 MG tablet, Take 1 tablet by mouth 2 (Two) Times a Day. 2 tabs in am, 1 tab in pm, Disp: , Rfl:     tamsulosin (FLOMAX) 0.4 MG capsule 24 hr capsule, Take 0.4 mg by mouth daily., Disp: , Rfl:     Tiadylt  MG 24 hr capsule, Take 1 capsule by mouth Daily., Disp: , Rfl:     vitamin D (ERGOCALCIFEROL) 1.25 MG (11822 UT) capsule capsule, Take 1 capsule by mouth Every 30 (Thirty) Days., Disp: , Rfl:     The following portions of the patient's history were reviewed and updated as appropriate: allergies, current medications, past family history, past medical history, past social history, past surgical history and problem list.    REVIEW OF SYSTEMS  Review of Systems  "  Constitutional:  Positive for fatigue.   HENT:  Negative.     Respiratory:  Positive for hemoptysis (occasionally in the morning) and shortness of breath (stable per patient).         2L O2     Cardiovascular: Negative.    Gastrointestinal: Negative.    Endocrine: Negative.    Genitourinary: Negative.     Musculoskeletal: Negative.    Skin: Negative.    Neurological: Negative.    Hematological: Negative.    Psychiatric/Behavioral: Negative.       PHYSICAL EXAM  VITAL SIGNS:   Vitals:    03/27/25 1249   BP: 138/70   Pulse: 73   SpO2: 93%  Comment: 2L O2   Weight: 82.3 kg (181 lb 8 oz)   Height: 167.6 cm (66\")   PainSc: 0-No pain     Physical Exam  Vitals reviewed.   Constitutional:       Appearance: Normal appearance.   HENT:      Head: Normocephalic.      Nose: Nose normal.   Eyes:      Pupils: Pupils are equal, round, and reactive to light.   Cardiovascular:      Rate and Rhythm: Normal rate and regular rhythm.      Pulses: Normal pulses.      Heart sounds: Normal heart sounds.   Pulmonary:      Effort: Pulmonary effort is normal. No respiratory distress.      Breath sounds: Normal breath sounds. No wheezing.   Abdominal:      General: Bowel sounds are normal.      Palpations: There is no mass.   Musculoskeletal:         General: Normal range of motion.      Cervical back: Normal range of motion and neck supple. No tenderness.   Lymphadenopathy:      Cervical: No cervical adenopathy.   Skin:     General: Skin is warm and dry.      Capillary Refill: Capillary refill takes less than 2 seconds.   Neurological:      General: No focal deficit present.      Mental Status: He is alert and oriented to person, place, and time.      Motor: No weakness.   Psychiatric:         Mood and Affect: Mood normal.         Behavior: Behavior normal.          Performance Status: ECOG (1) Restricted in physically strenuous activity, ambulatory and able to do work of light nature    Clinical Quality Measures  - Pain Documented by " Standardized Tool, FPS Hoang Renteria reports a pain score of 0. Given his pain assessment as noted, treatment options were discussed and the following options were decided upon as a follow-up plan to address the patient's pain:  No pain, no plan given .  Pain Medications              gabapentin (NEURONTIN) 300 MG capsule Take 300 mg by mouth 2 times daily.          - Body Mass Index Screening and Follow-Up Plan Body mass index is 29.29 kg/m².    - Tobacco Use: Screening and Cessation Intervention  Social History    Tobacco Use      Smoking status: Former        Packs/day: 0.00        Years: 1 pack/day for 65.0 years (65.0 ttl pk-yrs)        Types: Cigarettes        Start date: 2/10/1955        Quit date: 2/10/2020        Years since quittin.1        Passive exposure: Past      Smokeless tobacco: Former    - Advanced Care Planning Advance Care Planning   ACP discussion was held with the patient during this visit. Patient does not have an advance directive, information provided.    - PHQ-2 Depression Screening:  Little interest or pleasure in doing things? Not at all   Feeling down, depressed, or hopeless? Not at all   PHQ-2 Total Score 0     ASSESSMENT AND PLAN  1. Recurrent carcinoma of left lung    2. 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 Status post lobectomy of lung    4. History of radiation therapy    5. Former smoker      RECOMMENDATIONS: Hoang Renteria was diagnosed in 2020 with Adenocarcinoma of the lung, SANDRO, 1.3 cm. Underwent SANDRO wedge resection on 2020, pathology revealed  margins negative. Negative for PNI.   RECURRENCE: 2021 CT Chest revealed SANDRO nodularity increased, 1.8 cm (SUV 5.9). Completed 5000 cGy in 4 fractions to the left upper lobe of the lung on 2021.     Biopsies did not reveal any evidence of cancer. Return in 6 months for follow up to see Charlie LENNON. Continue to follow with Dr. Borjas in  as scheduled.    Patient Instructions   1)   Biopsies did not reveal any evidence of cancer.  Congratulations!!  2)  Please wear a mask when in your chicken coop, that may be causing your lung spots.  3)  Return in 6 months for follow up to see Charlie LENNON  4)  Follow with Dr. Borjas in June as scheduled.    Return in about 6 months (around 9/27/2025) for Office Visit- PLEASE ARRIVE 30 MINUTES EARLY.     Time Spent: I spent 45 minutes caring for Hoang on this date of service. This time includes time spent by me in the following activities: preparing for the visit, reviewing tests, obtaining and/or reviewing a separately obtained history, performing a medically appropriate examination and/or evaluation, counseling and educating the patient/family/caregiver, ordering medications, tests, or procedures, and documenting information in the medical record.   Luis Felipe Bruner III, MD  03/27/2025

## 2025-03-27 ENCOUNTER — OFFICE VISIT (OUTPATIENT)
Age: 85
End: 2025-03-27
Payer: MEDICARE

## 2025-03-27 VITALS
WEIGHT: 181.5 LBS | OXYGEN SATURATION: 93 % | BODY MASS INDEX: 29.17 KG/M2 | HEIGHT: 66 IN | HEART RATE: 73 BPM | DIASTOLIC BLOOD PRESSURE: 70 MMHG | SYSTOLIC BLOOD PRESSURE: 138 MMHG

## 2025-03-27 DIAGNOSIS — Z87.891 FORMER SMOKER: ICD-10-CM

## 2025-03-27 DIAGNOSIS — C34.92 RECURRENT CARCINOMA OF LEFT LUNG: Primary | ICD-10-CM

## 2025-03-27 DIAGNOSIS — Z92.3 HISTORY OF RADIATION THERAPY: ICD-10-CM

## 2025-03-27 DIAGNOSIS — J44.9 STAGE 2 MODERATE COPD BY GOLD CLASSIFICATION: ICD-10-CM

## 2025-03-27 DIAGNOSIS — Z90.2 STATUS POST LOBECTOMY OF LUNG: ICD-10-CM

## 2025-03-27 PROCEDURE — G0463 HOSPITAL OUTPT CLINIC VISIT: HCPCS | Performed by: RADIOLOGY

## 2025-03-27 NOTE — PATIENT INSTRUCTIONS
1)  Biopsies did not reveal any evidence of cancer.  Congratulations!!  2)  Please wear a mask when in your chicken coop, that may be causing your lung spots.  3)  Return in 6 months for follow up to see Charlie LENNON  4)  Follow with Dr. Borjas in June as scheduled.

## 2025-05-09 ENCOUNTER — TELEPHONE (OUTPATIENT)
Dept: PULMONOLOGY | Facility: CLINIC | Age: 85
End: 2025-05-09
Payer: MEDICARE

## 2025-05-09 NOTE — TELEPHONE ENCOUNTER
Caller: CARO    Relationship:     Best call back number: [phone]      What was the call regarding: DESIREE FROM [employer] CALLED STATING SHE SENT OVER A PRESCRIPTION REQUEST FOR PT PORTABLE OXYGEN CONCENTRATION AND SHE IS NEEDING PRESCRIPTION SENT BACK SOME TIME TODAY . PLEASE ADVISE.

## 2025-06-09 ENCOUNTER — HOSPITAL ENCOUNTER (OUTPATIENT)
Dept: CT IMAGING | Facility: HOSPITAL | Age: 85
Discharge: HOME | End: 2025-06-09
Admitting: NURSE PRACTITIONER
Payer: MEDICARE

## 2025-06-09 ENCOUNTER — RESULTS FOLLOW-UP (OUTPATIENT)
Dept: PULMONOLOGY | Facility: CLINIC | Age: 85
End: 2025-06-09
Payer: MEDICARE

## 2025-06-09 DIAGNOSIS — R91.8 MULTIPLE LUNG NODULES: ICD-10-CM

## 2025-06-09 PROCEDURE — 71250 CT THORAX DX C-: CPT

## 2025-06-12 ENCOUNTER — OFFICE VISIT (OUTPATIENT)
Dept: PULMONOLOGY | Facility: CLINIC | Age: 85
End: 2025-06-12
Payer: MEDICARE

## 2025-06-12 VITALS
BODY MASS INDEX: 28.12 KG/M2 | DIASTOLIC BLOOD PRESSURE: 80 MMHG | WEIGHT: 175 LBS | HEART RATE: 96 BPM | OXYGEN SATURATION: 90 % | SYSTOLIC BLOOD PRESSURE: 122 MMHG | HEIGHT: 66 IN

## 2025-06-12 DIAGNOSIS — J44.9 STAGE 2 MODERATE COPD BY GOLD CLASSIFICATION: ICD-10-CM

## 2025-06-12 DIAGNOSIS — Z92.3 HISTORY OF RADIATION THERAPY: ICD-10-CM

## 2025-06-12 DIAGNOSIS — Z85.118 PERSONAL HISTORY OF LUNG CANCER: ICD-10-CM

## 2025-06-12 DIAGNOSIS — J96.11 CHRONIC RESPIRATORY FAILURE WITH HYPOXIA: ICD-10-CM

## 2025-06-12 DIAGNOSIS — R91.8 MULTIPLE LUNG NODULES: Primary | ICD-10-CM

## 2025-06-12 RX ORDER — ASPIRIN 81 MG/1
81 TABLET, CHEWABLE ORAL DAILY
COMMUNITY

## 2025-06-12 NOTE — PROGRESS NOTES
Background:  Pt w lung ca wedge res 2020, local recurrence tx sbrt, gold 2 copd, hx RA, CAD CKD   Chief Complaint  COPD    Subjective    History of Present Illness     Hoang Renteria is here for follow up with [facility].  History of Present Illness  Breathing is ok.  He has had some anxiety breathing at night.  He had very bad times with that in the past.  He gets a cold sensation in chest and shoulders when lying down. He continues on bronchodilators.  He remains on portable oxygen concentrator     Tobacco Use: Medium Risk (6/12/2025)    Patient History     Smoking Tobacco Use: Former     Smokeless Tobacco Use: Former     Passive Exposure: Past      Current Outpatient Medications   Medication Instructions    albuterol (PROVENTIL) 2.5 mg, Every 4 Hours PRN    albuterol sulfate  (90 Base) MCG/ACT inhaler 2 puffs, Inhalation, Every 4 Hours PRN, Patient using 2 puffs twice a day    allopurinol (ZYLOPRIM) 100 mg, Daily    aspirin 81 mg, Daily    atorvastatin (LIPITOR) 10 MG tablet 1 tablet, Daily    clobetasol (TEMOVATE) 0.05 % cream 2 Times Daily    clopidogrel (PLAVIX) 75 mg, Daily    Cobalamine Combinations (B-12) 100-5000 MCG sublingual tablet Place  under the tongue.    colestipol (COLESTID) 1 g, Oral, 4 Times Daily    dilTIAZem CD (CARDIZEM CD) 120 MG 24 hr capsule 1 capsule, Daily    Eliquis 5 mg, 2 Times Daily    fluticasone (FLONASE) 50 MCG/ACT nasal spray 2 sprays, Daily    furosemide (LASIX) 40 MG tablet 1 tablet, Daily    gabapentin (NEURONTIN) 300 MG capsule Take 300 mg by mouth 2 times daily.    hydroxychloroquine (PLAQUENIL) 200 mg, Daily    nitroglycerin (NITROSTAT) 0.4 MG SL tablet     O2 (OXYGEN) 3 L/min, Once    Omega 3 1200 MG capsule 1 capsule    saline (AYR) gel nasal gel 1 Application, Topical, As Needed    sodium bicarbonate 325 mg, 2 Times Daily    tamsulosin (FLOMAX) 0.4 MG capsule 24 hr capsule Take 0.4 mg by mouth daily.     "Tiadylt  MG 24 hr capsule 1 capsule, Daily    vitamin D (ERGOCALCIFEROL) 50,000 Units, Every 30 Days      Objective     Vital Signs:   /80   Pulse 96   Ht 167.6 cm (66\")   Wt 79.4 kg (175 lb)   SpO2 90% Comment: 3LPD  BMI 28.25 kg/m²   Physical Exam  Constitutional:       Appearance: He is not toxic-appearing.      Interventions: Nasal cannula in place.   Pulmonary:      Effort: Pulmonary effort is normal. No respiratory distress.      Breath sounds: No wheezing.        Result Review  Data Reviewed:    CT Chest Without Contrast Diagnostic  Result Date: 6/9/2025  Impression:  1.  Stable posttreatment change in the left lateral upper lung.  2.  No definite evidence of metastatic disease in the chest.  3.  Most of the nodules on the prior study have near completely resolved with areas of faint residual groundglass opacity remaining. A few small 3 to 5 mm pulmonary nodules do persist.   This report was signed and finalized on 6/9/2025 8:33 AM by Dr. Gurpreet Dennis MD.        PFT Values          6/14/2023    09:00 3/6/2025    08:30   Pre Drug PFT Results   FVC 90 90   FEV1 74 80   FEF 25-75% 44 56   FEV1/FVC 62 66   Other Tests PFT Results   DLCO  34   D/VAsb  42                Assessment and Plan    Diagnoses and all orders for this visit:    1. Multiple lung nodules (Primary)    2. 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 Chronic respiratory failure with hypoxia    4. Personal history of lung cancer    5. History of radiation therapy    Try pushing the pm dose of albuterol mdi to about 6 pm to see if that improves his evening symptoms..  Continue nebs as scheduled.  Consider conmbination tx if he worsens.  Continue oxygen, compliant and benefiting.  Continue ct follow up per rad onc protocol  current ct looks favorable.    Follow Up   Return in about 1 year (around 6/12/2026).  Patient was given instructions and counseling regarding his condition or for health maintenance advice. Please see " specific information pulled into the AVS if appropriate.    Electronically signed by Camron Borjas MD, 6/12/2025, 13:00 CDT

## 2025-07-23 ENCOUNTER — TELEPHONE (OUTPATIENT)
Age: 85
End: 2025-07-23
Payer: MEDICARE

## 2025-07-23 NOTE — TELEPHONE ENCOUNTER
Pt cancellation online. I called to see if it needed to be rescheduled and I was told the patient had passed away.

## (undated) DEVICE — THE SINGLE USE ETRAP – POLYP TRAP IS USED FOR SUCTION RETRIEVAL OF ENDOSCOPICALLY REMOVED POLYPS.: Brand: ETRAP

## (undated) DEVICE — TBG PRESS EXT

## (undated) DEVICE — KT ASP VIZISHOT 5SYRG 5BIOPSY/VLV 22G

## (undated) DEVICE — SENSR O2 OXIMAX FNGR A/ 18IN NONSTR

## (undated) DEVICE — YANKAUER,BULB TIP WITH VENT: Brand: ARGYLE

## (undated) DEVICE — SINGLE USE BIOPSY VALVE MAJ-210: Brand: SINGLE USE BIOPSY VALVE (STERILE)

## (undated) DEVICE — CUFF,BP,DISP,1 TUBE,ADULT,HP: Brand: MEDLINE

## (undated) DEVICE — Device: Brand: BALLOON

## (undated) DEVICE — ADAPT SWVL FIBROPTIC BRONCH

## (undated) DEVICE — Device: Brand: DEFENDO AIR/WATER/SUCTION AND BIOPSY VALVE

## (undated) DEVICE — FRCP BIOP SUPERDIMENSION PREMARK

## (undated) DEVICE — THE CHANNEL CLEANING BRUSH IS A NYLON FLEXI BRUSH ATTACHED TO A FLEXIBLE PLASTIC SHEATH DESIGNED TO SAFELY REMOVE DEBRIS FROM FLEXIBLE ENDOSCOPES.

## (undated) DEVICE — SINGLE USE SUCTION VALVE MAJ-209: Brand: SINGLE USE SUCTION VALVE (STERILE)

## (undated) DEVICE — TRAP,MUCUS SPECIMEN, 80CC: Brand: MEDLINE

## (undated) DEVICE — FRCP BIOP ENDO CAPTURA/PRO SERR SPK 2.8MM 230CM

## (undated) DEVICE — FRCP BX RADJAW4 NDL 2.8 240 STD OG

## (undated) DEVICE — ENDOGATOR AUXILIARY WATER JET CONNECTOR: Brand: ENDOGATOR

## (undated) DEVICE — SNAR POLYP CAPTIVATOR MICROHEX 13 240CM

## (undated) DEVICE — MASK,OXYGEN,MED CONC,ADLT,7' TUB, UC: Brand: PENDING

## (undated) DEVICE — CONMED SCOPE SAVER BITE BLOCK, 20X27 MM: Brand: SCOPE SAVER

## (undated) DEVICE — TBG SMPL FLTR LINE NASL 02/C02 A/ BX/100

## (undated) DEVICE — VISION PROBE ADAPTER AND SUCTION ADAPTER

## (undated) DEVICE — BIOPSY NEEDLE, 23G: Brand: FLEXISION

## (undated) DEVICE — Device: Brand: ION

## (undated) DEVICE — SWIVEL CONNECTOR